# Patient Record
Sex: MALE | Race: WHITE | NOT HISPANIC OR LATINO | Employment: OTHER | URBAN - METROPOLITAN AREA
[De-identification: names, ages, dates, MRNs, and addresses within clinical notes are randomized per-mention and may not be internally consistent; named-entity substitution may affect disease eponyms.]

---

## 2017-01-06 ENCOUNTER — OFFICE VISIT (OUTPATIENT)
Dept: UROLOGY | Facility: CLINIC | Age: 72
End: 2017-01-06
Payer: COMMERCIAL

## 2017-01-06 VITALS
HEART RATE: 99 BPM | SYSTOLIC BLOOD PRESSURE: 145 MMHG | DIASTOLIC BLOOD PRESSURE: 72 MMHG | BODY MASS INDEX: 23.46 KG/M2 | WEIGHT: 149.5 LBS | HEIGHT: 67 IN

## 2017-01-06 DIAGNOSIS — R97.20 ELEVATED PSA: Primary | ICD-10-CM

## 2017-01-06 LAB
BILIRUB SERPL-MCNC: ABNORMAL MG/DL
BLOOD URINE, POC: ABNORMAL
COLOR, POC UA: YELLOW
GLUCOSE UR QL STRIP: 250
KETONES UR QL STRIP: ABNORMAL
LEUKOCYTE ESTERASE URINE, POC: ABNORMAL
NITRITE, POC UA: ABNORMAL
PH, POC UA: 6
PROTEIN, POC: ABNORMAL
SPECIFIC GRAVITY, POC UA: 1.01
UROBILINOGEN, POC UA: ABNORMAL

## 2017-01-06 PROCEDURE — 81002 URINALYSIS NONAUTO W/O SCOPE: CPT | Mod: S$GLB,,, | Performed by: UROLOGY

## 2017-01-06 PROCEDURE — 99204 OFFICE O/P NEW MOD 45 MIN: CPT | Mod: 25,S$GLB,, | Performed by: UROLOGY

## 2017-01-06 NOTE — PROGRESS NOTES
[facility] Note - [facility]  Staff: MD Michael    Referring provider and please cc: Dr.Rowe Merrill  PCP: same    [facility]:active    Chief Complaint: elevated psa    Subjective:        HPI: Nilton Sherwood is a 71 y.o. male presents with     He is being referred for elevated psa. Denies any uti symptoms and has never had any. He appears to have had elevated psa since , with psa of 4.2 and has risen most recently to 7.0. He has a h/o prostate biopsy in  by  at [facility], of which results were negative. He is not sure what his psa was then.    Denies back pain or bone pain.        AUA SSx: 8, mostly satisfied  IIEF: did not fill out    ECOG Status: 0    Gross Hematuria:No  STDs in past: No  Vasectomy: no    REVIEW OF SYSTEMS:  General ROS: no fevers, no chills  Psychological ROS: no depression  Endocrine ROS: no heat or cold  Respiratory ROS: no SOB  Cardiovascular ROS: no CP  Gastrointestinal ROS: no abdominal pain, no constipation, occ diarrhea, no BRBPR  Musculoskeletal ROS: no muscle pain  Neurological ROS: no headaches  Dermatological ROS: no rashes  HEENT: +glasses, no sinus   ROS: per HPI     PMHx:  Past Medical History   Diagnosis Date    Elevated glucose     Elevated PSA     Hyperlipidemia     Tobacco abuse    type II diabetes  Kidney stones: No  Cataracts? removed    PSHx:  Past Surgical History   Procedure Laterality Date    Prostate biopsy  2014     Negative       Stents/Valves/Foreign Bodies: No  Cardiac Evaluation: No    Screening Studies  Colonoscopy: yes, 6-8 years ago    Fam Hx:   malignancies: No  . Mother  a 82. Father  a ?  kidney stones: No     Soc Hx:  +tobacco.  1/2 pk per day x 20 year  noalcohol  Lives in [place]  :unmarried  Children: none  Occupation:retired retail managment    Allergies:  Review of patient's allergies indicates no known allergies.    Medications: reviewed   Anticoagulation: Yes - asa 81mg  daily    Objective:     Vitals:    01/06/17 1128   BP: (!) 145/72   Pulse: 99         General:WDWN in NAD  Eyes: PERRLA, normal conjunctiva  Respiratory: no increased work on breathing, clear to auscultation  Cardiovascular: regular rate and rhythm. No obvious extremity edema.  GI: palpation of masses. No tenderness. No hepatosplenomegaly to palpation.  Musculoskeletal: normal range of motion of bilateral upper extremities. Normal muscle strength and tone.  Skin: no obvious rashes or lesions. No tightening of skin noted.  Neurologic: CN grossly normal. Normal sensation.   Psychiatric: awake, alert and oriented x 3. Mood and affect normal. Cooperative.    :  Inspection of anus normal  No scrotal rashes, cysts or lesions  Epididymis normal in size, no tenderness  Testes normal and size, equal size bilaterally, no masses  Urethral meatus normal without discharge  Penis is not circumcised, easily retractable foreskin but slightly erythematous. Small 0.25cm brownish flat mole on glans.   ERNESTO: 30g gland without masses, tenderness. SV not palpable. Normal sphincter tone. +hemhorroids. Left gland more prominent but no nodules felt  No bilateral inguinal hernias noted           LABS REVIEW:  UA today: 1.015/6/250 glucose  UCx:  none    Cr:   Lab Results   Component Value Date    CREATININE 1.02 12/22/2016       PSA:   12/22/16 7.0  6/23/16 4.9  6/17/15 4.2    No results found for: PSA  Testosterone: No results found for: TESTOSTERONE    PATHOLOGY REVIEW:  none    RADIOGRAPHIC REVIEW:  none      Assessment:       1. Elevated PSA          Plan:     We need a copy of his prostate biopsy results, psa and op note from  from 2014. Depending on the results we will decide whether or not to repeat a prostate biopsy vs proceed with mri prostate. If his psa was 7 or higher previously and he had a negative prostate biopsy will plan for MRI prostate. If his psa was lower and had a negative prostate bx will plan for prostate  biopsy.     Of note he would need bactrim op and gent proep if we do proceed with bx, no valium as well as he would like to drive himself home.     Also asked pt to ensure he retracts foreskin daily and keeps his penis clean. Especially with his diabetes and risk of balanoposthitis.    And asked him to watch the lesion on his glans, feel like this is a mole but will need to watch to ensure this does not change in appearance.     Aleta Rodriguez MD

## 2017-01-12 ENCOUNTER — TELEPHONE (OUTPATIENT)
Dept: UROLOGY | Facility: CLINIC | Age: 72
End: 2017-01-12

## 2017-01-12 NOTE — TELEPHONE ENCOUNTER
Review of records from dr garo warren's office show that he had asap in his prostate. Looks like pt has f/u next Friday - i will discuss these results with him then, need to schedule prostate biopsy for him.   Of note he would need bactrim op and gent proep if we do proceed with bx, no valium as well as he would like to drive himself home.       trus bx 1/16/14: 15 g prostate on exam  trus bx results 1/16/14: initially b9 but then amenment states he had asap in left apex and left base

## 2017-01-20 ENCOUNTER — OFFICE VISIT (OUTPATIENT)
Dept: UROLOGY | Facility: CLINIC | Age: 72
End: 2017-01-20
Payer: COMMERCIAL

## 2017-01-20 VITALS
DIASTOLIC BLOOD PRESSURE: 70 MMHG | WEIGHT: 149.25 LBS | HEIGHT: 67 IN | SYSTOLIC BLOOD PRESSURE: 124 MMHG | BODY MASS INDEX: 23.43 KG/M2 | HEART RATE: 88 BPM

## 2017-01-20 DIAGNOSIS — R97.20 ELEVATED PSA: Primary | ICD-10-CM

## 2017-01-20 DIAGNOSIS — R89.7 ABNORMAL PROSTATE BIOPSY: ICD-10-CM

## 2017-01-20 LAB
BILIRUB SERPL-MCNC: ABNORMAL MG/DL
BLOOD URINE, POC: ABNORMAL
COLOR, POC UA: YELLOW
GLUCOSE UR QL STRIP: 50
KETONES UR QL STRIP: ABNORMAL
LEUKOCYTE ESTERASE URINE, POC: ABNORMAL
NITRITE, POC UA: ABNORMAL
PH, POC UA: 6
PROTEIN, POC: ABNORMAL
SPECIFIC GRAVITY, POC UA: 1.01
UROBILINOGEN, POC UA: ABNORMAL

## 2017-01-20 PROCEDURE — 81002 URINALYSIS NONAUTO W/O SCOPE: CPT | Mod: S$GLB,,, | Performed by: UROLOGY

## 2017-01-20 PROCEDURE — 99213 OFFICE O/P EST LOW 20 MIN: CPT | Mod: 25,S$GLB,, | Performed by: UROLOGY

## 2017-01-20 RX ORDER — SULFAMETHOXAZOLE AND TRIMETHOPRIM 800; 160 MG/1; MG/1
1 TABLET ORAL 2 TIMES DAILY
Qty: 3 TABLET | Refills: 0 | Status: SHIPPED | OUTPATIENT
Start: 2017-01-20 | End: 2017-01-22

## 2017-01-20 RX ORDER — GENTAMICIN SULFATE 40 MG/ML
80 INJECTION, SOLUTION INTRAMUSCULAR; INTRAVENOUS
Status: DISCONTINUED | OUTPATIENT
Start: 2017-01-20 | End: 2017-02-03

## 2017-01-20 RX ORDER — DIAZEPAM 5 MG/1
5 TABLET ORAL ONCE
Qty: 3 TABLET | Refills: 0 | Status: SHIPPED | OUTPATIENT
Start: 2017-01-20 | End: 2017-02-21

## 2017-01-20 NOTE — PROGRESS NOTES
[facility] - [facility]  Staff: MD Michael    Referring provider and please cc: Dr.Rowe Merrill  PCP: same    [facility]:active    Chief Complaint: elevated psa    Subjective:        HPI: Nilton Sherwood is a 71 y.o. male presents with     He is being referred for elevated psa. Denies any uti symptoms and has never had any. He appears to have had elevated psa since 2015, with psa of 4.2 and has risen most recently to 7.0. He has a h/o prostate biopsy in 2014 by  at [facility], of which results were negative. He is not sure what his psa was then.    Denies back pain or bone pain.    AUA SSx: 8, mostly satisfied  IIEF: did not fill out    ECOG Status: 0        Past medical, surgical, social and family hx have been reviewed. There have not any changes.     Allergies:  Review of patient's allergies indicates no known allergies.    Medications: reviewed   Anticoagulation: Yes - asa 81mg daily    Objective:     Vitals:    01/20/17 0959   BP: 124/70   Pulse: 88         General:WDWN in NAD  Eyes: PERRLA, normal conjunctiva  Respiratory: no increased work on breathing, clear to auscultation  Cardiovascular: regular rate and rhythm. No obvious extremity edema.  GI: palpation of masses. No tenderness. No hepatosplenomegaly to palpation.  Musculoskeletal: normal range of motion of bilateral upper extremities. Normal muscle strength and tone.  Skin: no obvious rashes or lesions. No tightening of skin noted.  Neurologic: CN grossly normal. Normal sensation.   Psychiatric: awake, alert and oriented x 3. Mood and affect normal. Cooperative.    Last ERNESTO 12/27/16:   Penis is not circumcised, easily retractable foreskin but slightly erythematous. Small 0.25cm brownish flat mole on glans.   ERNESTO: 30g gland without masses, tenderness. SV not palpable. Normal sphincter tone. +hemhorroids. Left gland more prominent but no nodules felt  No bilateral inguinal hernias noted     LABS REVIEW:  UA  today: 1.010/6/50 glucose  UCx:  none    Cr:   Lab Results   Component Value Date    CREATININE 1.02 12/22/2016       PSA:   12/22/16 7.0  6/23/16 4.9  6/17/15 4.2  trus bx results 1/16/14: initially b9 but then amenment states he had asap in left apex and left base, 15 g prostate on exam    No results found for: PSA  Testosterone: No results found for: TESTOSTERONE    PATHOLOGY REVIEW:  none    RADIOGRAPHIC REVIEW:  none      Assessment:       1. Elevated PSA    2. Abnormal prostate biopsy          Plan:     Review of records from dr garo warren's office show that he had asap in his prostate.   Of note he would need bactrim op and gent proep, no valium as well as he would like to drive himself home. Needs another trus bx with focus on left apex.     asked him to watch the lesion on his glans, feel like this is a mole but will need to watch to ensure this does not change in appearance.     Aleta Rodriguez MD

## 2017-01-20 NOTE — MR AVS SNAPSHOT
[facility]  [address] [place] [address]  Phone: [phone]  Fax: [phone]                  Nilton Sherwood   2017 10:00 AM   Office Visit    Description:  Male : 1945   Provider:  Aleta Rodriguez MD   Department:  [facility]           Reason for Visit     Follow-up           Diagnoses this Visit        Comments    Elevated PSA    -  Primary     Abnormal prostate biopsy                To Do List           Goals (5 Years of Data)     None       These Medications        Disp Refills Start End    sulfamethoxazole-trimethoprim 800-160mg (BACTRIM DS) 800-160 mg Tab 3 tablet 0 2017    Take 1 tablet by mouth 2 (two) times daily. - Oral    [facility] [address] Ph #: [phone]       diazePAM (VALIUM) 5 MG tablet 3 tablet 0 2017    Take 1 tablet (5 mg total) by mouth once. - Oral    [facility] [address] Ph #: [phone]       sodium phosphates (FLEET ENEMA) 19-7 gram/118 mL Enem 2 Bottle 0 2017    Place 1 enema rectally once. 1 fleets night before procedure  1 fleets morning of procedure - Rectal    [facility] [address] Ph #: [phone]         [facility]     [facility] Nurse Care [facility] -  Assistance  Registered nurses in the [facility] provide clinical advisement, health education, appointment booking, and other advisory services.  Call for this free service at [phone].             Medications           Message regarding Medications     Verify the changes and/or additions to your medication regime listed below are the same as discussed with your clinician today.  If any of these changes or additions are incorrect, please notify your healthcare provider.        START taking these NEW medications        Refills     sulfamethoxazole-trimethoprim 800-160mg (BACTRIM DS) 800-160 mg Tab 0    Sig: Take 1 tablet by mouth 2 (two) times daily.    Class: Normal    Route: Oral    diazePAM (VALIUM) 5 MG tablet 0    Sig: Take 1 tablet (5 mg total) by mouth once.    Class: Normal    Route: Oral    sodium phosphates (FLEET ENEMA) 19-7 gram/118 mL Enem 0    Sig: Place 1 enema rectally once. 1 fleets night before procedure  1 fleets morning of procedure    Class: Print    Route: Rectal      These medications were administered today        Dose Freq    gentamicin injection 80 mg 80 mg Every 12 hours (non-standard times)    Sig: Inject 80 mg into the muscle every 12 (twelve) hours.    Class: Normal    Route: Intramuscular           Verify that the below list of medications is an accurate representation of the medications you are currently taking.  If none reported, the list may be blank. If incorrect, please contact your healthcare provider. Carry this list with you in case of emergency.           Current Medications     aspirin (ECOTRIN) 81 MG EC tablet Take 81 mg by mouth once daily.    metformin (GLUCOPHAGE) 500 MG tablet Take 1 tablet (500 mg total) by mouth 2 (two) times daily with meals.    omega-3 acid ethyl esters (LOVAZA) 1 gram capsule Take 1 capsule (1 g total) by mouth 2 (two) times daily.    simvastatin (ZOCOR) 40 MG tablet take 1 tablet by mouth every evening    diazePAM (VALIUM) 5 MG tablet Take 1 tablet (5 mg total) by mouth once.    sodium phosphates (FLEET ENEMA) 19-7 gram/118 mL Enem Place 1 enema rectally once. 1 fleets night before procedure  1 fleets morning of procedure    sulfamethoxazole-trimethoprim 800-160mg (BACTRIM DS) 800-160 mg Tab Take 1 tablet by mouth 2 (two) times daily.           Clinical Reference Information           Vital Signs - Last Recorded  Most recent update: 1/20/2017  9:59 AM by Helga Castaneda LPN    BP Pulse Ht Wt BMI    124/70 (BP Location: Left arm, Patient Position: Sitting, BP Method: Automatic)  "88 5' 7" (1.702 m) 67.7 kg (149 lb 4 oz) 23.38 kg/m2      Blood Pressure          Most Recent Value    BP  124/70      Allergies as of 1/20/2017     No Known Allergies      Immunizations Administered on Date of Encounter - 1/20/2017     None      Orders Placed During Today's Visit      Normal Orders This Visit    Case Request Operating Room: TRANSRECTAL ULTRASOUND GUIDED PROSTATE BIOPSY     Urine Dip with micro, POC       Smoking Cessation     If you would like to quit smoking:   You may be eligible for free services if you are a [place] resident and started smoking cigarettes before September 1, 1988.  Call the Smoking Cessation Trust (SCT) toll free at (704) 392-2501 or (997) 458-7364.   Call 2-585-QUIT-NOW if you do not meet the above criteria.            "

## 2017-02-03 RX ORDER — GENTAMICIN SULFATE 80 MG/100ML
80 INJECTION, SOLUTION INTRAVENOUS
Status: DISCONTINUED | OUTPATIENT
Start: 2017-02-03 | End: 2017-02-03

## 2017-02-03 RX ORDER — SULFAMETHOXAZOLE AND TRIMETHOPRIM 800; 160 MG/1; MG/1
1 TABLET ORAL 2 TIMES DAILY
COMMUNITY
End: 2017-02-21

## 2017-02-06 ENCOUNTER — SURGERY (OUTPATIENT)
Age: 72
End: 2017-02-06

## 2017-02-06 ENCOUNTER — HOSPITAL ENCOUNTER (OUTPATIENT)
Facility: AMBULARY SURGERY CENTER | Age: 72
Discharge: HOME | End: 2017-02-06
Attending: UROLOGY | Admitting: UROLOGY
Payer: COMMERCIAL

## 2017-02-06 DIAGNOSIS — R97.20 ELEVATED PSA: ICD-10-CM

## 2017-02-06 DIAGNOSIS — R89.7 ABNORMAL PROSTATE BIOPSY: ICD-10-CM

## 2017-02-06 LAB
BACTERIA SPEC CULT: NORMAL
BILIRUB SERPL-MCNC: NORMAL MG/DL
BLOOD URINE, POC: NORMAL
CASTS: NORMAL
COLOR, POC UA: NORMAL
CRYSTALS: NORMAL
GLUCOSE UR QL STRIP: NORMAL
KETONES UR QL STRIP: NORMAL
LEUKOCYTE ESTERASE URINE, POC: NORMAL
NITRITE, POC UA: NORMAL
PH, POC UA: 8
PROTEIN, POC: NORMAL
RBC CELLS COUNTED: NORMAL
SPECIFIC GRAVITY, POC UA: 1
UROBILINOGEN, POC UA: NORMAL
WHITE BLOOD CELLS: NORMAL

## 2017-02-06 PROCEDURE — 88305 TISSUE EXAM BY PATHOLOGIST: CPT | Performed by: PATHOLOGY

## 2017-02-06 PROCEDURE — 76872 US TRANSRECTAL: CPT | Performed by: UROLOGY

## 2017-02-06 PROCEDURE — 76942 ECHO GUIDE FOR BIOPSY: CPT | Mod: 26,59,, | Performed by: UROLOGY

## 2017-02-06 PROCEDURE — 88341 IMHCHEM/IMCYTCHM EA ADD ANTB: CPT | Mod: 26,,, | Performed by: PATHOLOGY

## 2017-02-06 PROCEDURE — 76872 US TRANSRECTAL: CPT | Mod: 26,,, | Performed by: UROLOGY

## 2017-02-06 PROCEDURE — 88342 IMHCHEM/IMCYTCHM 1ST ANTB: CPT | Mod: 26,,, | Performed by: PATHOLOGY

## 2017-02-06 PROCEDURE — 55700 PR BIOPSY OF PROSTATE,NEEDLE/PUNCH: CPT | Mod: ,,, | Performed by: UROLOGY

## 2017-02-06 PROCEDURE — 55700 HC PROSTATE NEEDLE BIOPSY: CPT | Performed by: UROLOGY

## 2017-02-06 RX ORDER — LIDOCAINE HYDROCHLORIDE 20 MG/ML
JELLY TOPICAL
Status: DISCONTINUED
Start: 2017-02-06 | End: 2017-02-06 | Stop reason: HOSPADM

## 2017-02-06 RX ORDER — GENTAMICIN SULFATE 40 MG/ML
80 INJECTION, SOLUTION INTRAMUSCULAR; INTRAVENOUS ONCE
Status: COMPLETED | OUTPATIENT
Start: 2017-02-06 | End: 2017-02-06

## 2017-02-06 RX ORDER — LIDOCAINE HYDROCHLORIDE 10 MG/ML
INJECTION, SOLUTION EPIDURAL; INFILTRATION; INTRACAUDAL; PERINEURAL
Status: DISCONTINUED | OUTPATIENT
Start: 2017-02-06 | End: 2017-02-06 | Stop reason: HOSPADM

## 2017-02-06 RX ORDER — LIDOCAINE HYDROCHLORIDE 20 MG/ML
JELLY TOPICAL
Status: DISCONTINUED | OUTPATIENT
Start: 2017-02-06 | End: 2017-02-06 | Stop reason: HOSPADM

## 2017-02-06 RX ORDER — GENTAMICIN SULFATE 40 MG/ML
INJECTION, SOLUTION INTRAMUSCULAR; INTRAVENOUS
Status: DISPENSED
Start: 2017-02-06 | End: 2017-02-07

## 2017-02-06 RX ADMIN — LIDOCAINE HYDROCHLORIDE 5 ML: 20 JELLY TOPICAL at 02:02

## 2017-02-06 RX ADMIN — LIDOCAINE HYDROCHLORIDE 10 ML: 10 INJECTION, SOLUTION EPIDURAL; INFILTRATION; INTRACAUDAL; PERINEURAL at 03:02

## 2017-02-06 RX ADMIN — GENTAMICIN SULFATE 80 MG: 40 INJECTION, SOLUTION INTRAMUSCULAR; INTRAVENOUS at 02:02

## 2017-02-06 NOTE — DISCHARGE INSTRUCTIONS
Transrectal Ultrasound and Biopsy  A transrectal ultrasound is an imaging test. It uses sound waves to create pictures of a mans prostate gland. Your prostate gland is in front of your rectum. For this test, a special probe (transducer) is placed directly into your rectum. During the test, tissue samples (a biopsy) may also be taken. The test is done by a specially trained technologist (a sonographer).    Before leaving, you may need to wait for a short time while the images are reviewed. In most cases, you can go back to your normal routine after the test. If you had a biopsy, you may see some blood in your urine, sperm, or stool for a day or so. This is normal. Your health care provider will let you know when your test results are ready.  In some cases, a diagnosis cant be made from the tissue sample that was taken. If this happens, your provider will talk with you about whether you may need another biopsy. Or you may need a different procedure.       © 4703-4668 [employer]. [address] [place] [address], [place] [address]. All rights reserved. This information is not intended as a substitute for professional medical care. Always follow your healthcare professional's instructions.

## 2017-02-06 NOTE — IP AVS SNAPSHOT
[facility] [place] Location  [address] [address]           Patient Discharge Instructions     Our goal is to set you up for success. This packet includes information on your condition, medications, and your home care. It will help you to care for yourself so you don't get sicker and need to go back to the hospital.     Please ask your nurse if you have any questions.        There are many details to remember when preparing to leave the hospital. Here is what you will need to do:    1. Take your medicine. If you are prescribed medications, review your Medication List in the following pages. You may have new medications to  at the pharmacy and others that you'll need to stop taking. Review the instructions for how and when to take your medications. Talk with your doctor or nurses if you are unsure of what to do.     2. Go to your follow-up appointments. Specific follow-up information is listed in the following pages. Your may be contacted by a transition nurse or clinical provider about future appointments. Be sure we have all of the phone numbers to reach you, if needed. Please contact your provider's office if you are unable to make an appointment.     3. Watch for warning signs. Your doctor or nurse will give you detailed warning signs to watch for and when to call for assistance. These instructions may also include educational information about your condition. If you experience any of warning signs to your health, call your doctor.               [facility]  Unless otherwise directed by your provider, please contact [facility], our nurse care line that is available for 24/7 assistance.     [phone] (toll-free)    Registered nurses in the [facility] provide clinical advisement, health education, appointment booking, and other advisory services.                    ** Verify the list of medication(s) below is accurate and up  to date. Carry this with you in case of emergency. If your medications have changed, please notify your healthcare provider.             Medication List      ASK your doctor about these medications        Additional Info                      aspirin 81 MG EC tablet   Commonly known as:  ECOTRIN   Refills:  0   Dose:  81 mg    Instructions:  Take 81 mg by mouth once daily.     Begin Date    AM    Noon    PM    Bedtime       diazePAM 5 MG tablet   Commonly known as:  VALIUM   Quantity:  3 tablet   Refills:  0   Dose:  5 mg    Instructions:  Take 1 tablet (5 mg total) by mouth once.     Begin Date    AM    Noon    PM    Bedtime       DISPOSABLE ENEMA 19-7 gram/118 mL Enem   Refills:  0   Dose:  1 enema   Generic drug:  sodium phosphates    Instructions:  Place 1 enema rectally once.     Begin Date    AM    Noon    PM    Bedtime       metformin 500 MG tablet   Commonly known as:  GLUCOPHAGE   Quantity:  60 tablet   Refills:  3   Dose:  500 mg    Instructions:  Take 1 tablet (500 mg total) by mouth 2 (two) times daily with meals.     Begin Date    AM    Noon    PM    Bedtime       omega-3 acid ethyl esters 1 gram capsule   Commonly known as:  LOVAZA   Quantity:  60 capsule   Refills:  11   Dose:  1 g    Instructions:  Take 1 capsule (1 g total) by mouth 2 (two) times daily.     Begin Date    AM    Noon    PM    Bedtime       simvastatin 40 MG tablet   Commonly known as:  ZOCOR   Quantity:  90 tablet   Refills:  3   Dose:  40 mg    Instructions:  Take 1 tablet (40 mg total) by mouth every evening.     Begin Date    AM    Noon    PM    Bedtime       sulfamethoxazole-trimethoprim 800-160mg 800-160 mg Tab   Commonly known as:  BACTRIM DS   Refills:  0   Dose:  1 tablet    Instructions:  Take 1 tablet by mouth 2 (two) times daily. As directed     Begin Date    AM    Noon    PM    Bedtime                  Please bring to all follow up appointments:    1. A copy of your discharge instructions.  2. All medicines you are  currently taking in their original bottles.  3. Identification and insurance card.    Please arrive 15 minutes ahead of scheduled appointment time.    Please call 24 hours in advance if you must reschedule your appointment and/or time.        Your Scheduled Appointments     Feb 21, 2017 10:00 AM CST   Post OP with MD [facility] ([place] [facility])    [address]  [place] [address]   [phone]            Apr 11, 2017  9:45 AM CDT   LAB-OCC with  - LAB   Sha Merrill III, M.D. ([facility])    [address]  [facility] [address] [phone]            Apr 18, 2017 10:30 AM CDT   Established Patient with MD Sha Claros III, III, M.D. ([facility])    [address]  [facility] [address] [phone]                Discharge Instructions     Future Orders    Diet general     Questions:    Total calories:      Fat restriction, if any:      Protein restriction, if any:      Na restriction, if any:      Fluid restriction:      Additional restrictions:          Discharge Instructions           Transrectal Ultrasound and Biopsy  A transrectal ultrasound is an imaging test. It uses sound waves to create pictures of a mans prostate gland. Your prostate gland is in front of your rectum. For this test, a special probe (transducer) is placed directly into your rectum. During the test, tissue samples (a biopsy) may also be taken. The test is done by a specially trained technologist (a sonographer).    Before leaving, you may need to wait for a short time while the images are reviewed. In most cases, you can go back to your normal routine after the test. If you had a biopsy, you may see some blood in your urine, sperm, or stool for a day or so. This is normal. Your health care provider will let you know when your test results are ready.  In some cases, a diagnosis cant be made from the tissue sample that was taken. If this happens, your  "provider will talk with you about whether you may need another biopsy. Or you may need a different procedure.       © 4229-6277 [employer]. [address] [place] [address], [place] [address]. All rights reserved. This information is not intended as a substitute for professional medical care. Always follow your healthcare professional's instructions.            Admission Information     Date & Time Provider Department CSN    2/6/2017  1:30 PM Aleta Rodriguez MD [facility] [id number]      Care Providers     Provider Role Specialty Primary office phone    Aleta Rodriguez MD Attending Provider Urology [phone]    Aleta Rodriguez MD Surgeon  Urology [phone]      Your Vitals Were     BP Pulse Temp Resp Height Weight    137/71 (BP Location: Right arm, Patient Position: Sitting, BP Method: Automatic) 75 97.4 °F (36.3 °C) (Oral) 20 5' 7" (1.702 m) 67.6 kg (149 lb)    SpO2 BMI             98% 23.34 kg/m2         Recent Lab Values     No lab values to display.      Allergies as of 2/6/2017     No Known Allergies      Smoking Cessation     If you would like to quit smoking:   You may be eligible for free services if you are a [place] resident and started smoking cigarettes before September 1, 1988.  Call the Smoking Cessation Trust (SCT) toll free at (911) 785-6123 or (426) 243-5959.   Call 1-800-QUIT-NOW if you do not meet the above criteria.            Language Assistance Services     ATTENTION: Language assistance services are available, free of charge. Please call [phone].      ATENCIÓN: Si habla español, tiene a julian disposición servicios gratuitos de asistencia lingüística. Llame al [phone].     CHÚ Ý: N?u b?n nói Ti?ng Vi?t, có các d?ch v? h? tr? ngôn ng? mi?n phí dành cho b?n. G?i s? [phone].         [facility] complies with applicable Federal civil rights laws and does not discriminate on the basis of race, color, " national origin, age, disability, or sex.

## 2017-02-06 NOTE — H&P
[facility] Note - [facility]  Staff: MD Michael     Referring provider and please cc: Dr.Rowe Merrill  PCP: same     [facility]     Chief Complaint: elevated psa     Subjective:         HPI: Nilton Sherwood is a 71 y.o. male presents with      He is being referred for elevated psa. Denies any uti symptoms and has never had any. He appears to have had elevated psa since 2015, with psa of 4.2 and has risen most recently to 7.0. He has a h/o prostate biopsy in 2014 by  at [facility], of which results were negative initially but amendment later said asap of left apex and left base. He is not sure what his psa was then.     Denies back pain or bone pain.      AUA SSx: 8, mostly satisfied  IIEF: did not fill out     ECOG Status: 0           Past medical, surgical, social and family hx have been reviewed. There have not any changes.      Allergies:  Review of patient's allergies indicates no known allergies.     Medications: reviewed   Anticoagulation: Yes - asa 81mg daily     Objective:      Vitals:    02/06/17 1349   BP: 127/62   Pulse: 85   Resp: 20   Temp: 97.4 °F (36.3 °C)       General:WDWN in NAD  Eyes: PERRLA, normal conjunctiva  Respiratory: no increased work on breathing, clear to auscultation  Cardiovascular: regular rate and rhythm. No obvious extremity edema.  GI: palpation of masses. No tenderness. No hepatosplenomegaly to palpation.  Musculoskeletal: normal range of motion of bilateral upper extremities. Normal muscle strength and tone.  Skin: no obvious rashes or lesions. No tightening of skin noted.  Neurologic: CN grossly normal. Normal sensation.   Psychiatric: awake, alert and oriented x 3. Mood and affect normal. Cooperative.     Last ERNESTO 12/27/16:   Penis is not circumcised, easily retractable foreskin but slightly erythematous. Small 0.25cm brownish flat mole on glans.   ERNESTO: 30g gland without masses, tenderness. SV not palpable. Normal sphincter tone.  +hemhorroids. Left gland more prominent but no nodules felt  No bilateral inguinal hernias noted      LABS REVIEW:  UA today: 1.005/8/remainder neg  UCx:  none     Cr:         Lab Results   Component Value Date     CREATININE 1.02 12/22/2016         PSA:   12/22/16                    7.0  6/23/16                      4.9  6/17/15                      4.2  trus bx results 1/16/14: initially b9 but then amendment states he had asap in left apex and left base, 15 g prostate on exam     No results found for: PSA  Testosterone: No results found for: TESTOSTERONE     PATHOLOGY REVIEW:  none     RADIOGRAPHIC REVIEW:  none        Assessment:       1. Elevated PSA    2. Abnormal prostate biopsy           Plan:      Review of records from dr garo warren's office show that he had asap in his prostate.   Of note he would need bactrim op and gent proep, no valium as well as he would like to drive himself home. Needs another trus bx with focus on left apex.      asked him to watch the lesion on his glans, feel like this is a mole but will need to watch to ensure this does not change in appearance.      Aleta Rodriguez MD

## 2017-02-06 NOTE — OP NOTE
TRANSRECTAL PROSTATIC ULTRASOUND  TRANSRECTAL PROSTATE BIOPSY with ultrasound guidance    Staff surgeon: Michael  Date: 2017  Anesthesia: local  EBL: minimal  Complications: none    NAME:Nilton Sherwood  : 1945  Diagnosis:elevated psa, asap on previous biopsy (left apex and left base)  Current PSA: 7.0     TRANSRECTAL ULTRASOUND  Measurements:   Height:  27.1 mm  Width:  40.6 mm  Length:  40.8 mm  Volume: 23.7 cm3  PSAD: 0.29    Urinalysis: negative    Palpable Nodule: No    Anticoagulation: yes, asa 81mg    Seminal vesicles/Ejaculatory Ducts: Normal  Outline/Symmetry of Prostate: WNL - left base with possible exophytic mass included in biopsy  Central Gland/Transition Zone: Well demarcated  Peripheral Zone: WNL  Bladder: Normal  MedianLobe: Normal    It was confirmed that the patient took the antibiotic and fleets enemas last night and this morning. A ERNESTO was performed showing no palpable nodules. A total of 17 biopsies were taken with ultrasound guidance, using a spring loaded needle device. Two biopsies each were taken from the base, mid-portion, apex from both the left and right as well as one from each transition zone. 2 extra biopsies were taken from left base and 1 extra from left apex. Minimal rectal bleeding was observed. The patient tolerated the procedure well. He is to keep taking the prescribed antibiotics until finished. Post biopsy instructions were given and he is to follow up in one week to discuss the pathologist report.      Follow up in 10-14 days to discuss path  Pt was instructed when to return to ER  Aleta Rodriguez MD

## 2017-02-06 NOTE — DISCHARGE SUMMARY
[facility]  Discharge Note  Short Stay    Admit Date: 2/6/2017    Discharge Date and Time: No discharge date for patient encounter.     Attending Physician: Aleta Rodriguez,*     Discharge Provider: Aleta Rodriguez    Diagnoses:  Active Hospital Problems    Diagnosis  POA    Elevated PSA [R97.20]  Yes      Resolved Hospital Problems    Diagnosis Date Resolved POA   No resolved problems to display.       Discharged Condition: good    Hospital Course: Patient was admitted for an outpatient procedure and tolerated the procedure well with no complications.    Final Diagnoses: Same as principal problem.    Disposition: Home or Self Care    Follow up/Patient Instructions:    Medications:  Reconciled Home Medications:   Current Discharge Medication List      CONTINUE these medications which have NOT CHANGED    Details   aspirin (ECOTRIN) 81 MG EC tablet Take 81 mg by mouth once daily.      diazePAM (VALIUM) 5 MG tablet Take 1 tablet (5 mg total) by mouth once.  Qty: 3 tablet, Refills: 0      metformin (GLUCOPHAGE) 500 MG tablet Take 1 tablet (500 mg total) by mouth 2 (two) times daily with meals.  Qty: 60 tablet, Refills: 3      omega-3 acid ethyl esters (LOVAZA) 1 gram capsule Take 1 capsule (1 g total) by mouth 2 (two) times daily.  Qty: 60 capsule, Refills: 11    Associated Diagnoses: Hyperlipidemia      simvastatin (ZOCOR) 40 MG tablet Take 1 tablet (40 mg total) by mouth every evening.  Qty: 90 tablet, Refills: 3    Associated Diagnoses: Encounter for long-term (current) use of high-risk medication      sodium phosphates (DISPOSABLE ENEMA) 19-7 gram/118 mL Enem Place 1 enema rectally once.      sulfamethoxazole-trimethoprim 800-160mg (BACTRIM DS) 800-160 mg Tab Take 1 tablet by mouth 2 (two) times daily. As directed             Discharge Procedure Orders  Diet general           Discharge Procedure Orders (must include Diet, Follow-up, Activity):    Discharge Procedure Orders (must include  Diet, Follow-up, Activity)  Diet general

## 2017-02-07 VITALS
DIASTOLIC BLOOD PRESSURE: 71 MMHG | HEIGHT: 67 IN | SYSTOLIC BLOOD PRESSURE: 137 MMHG | HEART RATE: 75 BPM | WEIGHT: 149 LBS | OXYGEN SATURATION: 98 % | TEMPERATURE: 97 F | BODY MASS INDEX: 23.39 KG/M2 | RESPIRATION RATE: 20 BRPM

## 2017-02-21 ENCOUNTER — OFFICE VISIT (OUTPATIENT)
Dept: UROLOGY | Facility: CLINIC | Age: 72
End: 2017-02-21
Payer: COMMERCIAL

## 2017-02-21 VITALS
TEMPERATURE: 98 F | DIASTOLIC BLOOD PRESSURE: 78 MMHG | HEIGHT: 67 IN | WEIGHT: 150.13 LBS | SYSTOLIC BLOOD PRESSURE: 124 MMHG | BODY MASS INDEX: 23.56 KG/M2 | HEART RATE: 92 BPM

## 2017-02-21 DIAGNOSIS — C61 PROSTATE CANCER: Primary | ICD-10-CM

## 2017-02-21 LAB
BILIRUB SERPL-MCNC: ABNORMAL MG/DL
BLOOD URINE, POC: ABNORMAL
COLOR, POC UA: ABNORMAL
GLUCOSE UR QL STRIP: 100
KETONES UR QL STRIP: ABNORMAL
LEUKOCYTE ESTERASE URINE, POC: ABNORMAL
NITRITE, POC UA: ABNORMAL
PH, POC UA: 6
PROTEIN, POC: ABNORMAL
SPECIFIC GRAVITY, POC UA: 1.01
UROBILINOGEN, POC UA: ABNORMAL

## 2017-02-21 PROCEDURE — 81002 URINALYSIS NONAUTO W/O SCOPE: CPT | Mod: S$GLB,,, | Performed by: UROLOGY

## 2017-02-21 PROCEDURE — 99214 OFFICE O/P EST MOD 30 MIN: CPT | Mod: 25,S$GLB,, | Performed by: UROLOGY

## 2017-02-21 PROCEDURE — 99999 PR PBB SHADOW E&M-EST. PATIENT-LVL III: CPT | Mod: PBBFAC,,, | Performed by: UROLOGY

## 2017-02-21 NOTE — MR AVS SNAPSHOT
[facility]  [address]  [place] [address]  Phone: [phone]                  Nilton Sherwood   2017 10:00 AM   Office Visit    Description:  Male : 1945   Provider:  Aleta Rodriguez MD   Department:  [facility]           Reason for Visit     Post-op Evaluation           Diagnoses this Visit        Comments    Prostate cancer    -  Primary            To Do List           Future Appointments        Provider Department Dept Phone    3/14/2017 10:45 AM Narinder Her MD [facility] [phone]      Goals (5 Years of Data)     None      Follow-Up and Disposition     Follow-up and Disposition History      [facility]     [facility] -  Assistance  Registered nurses in the [facility] provide clinical advisement, health education, appointment booking, and other advisory services.  Call for this free service at [phone].             Medications           Message regarding Medications     Verify the changes and/or additions to your medication regime listed below are the same as discussed with your clinician today.  If any of these changes or additions are incorrect, please notify your healthcare provider.        STOP taking these medications     diazePAM (VALIUM) 5 MG tablet Take 1 tablet (5 mg total) by mouth once.    sulfamethoxazole-trimethoprim 800-160mg (BACTRIM DS) 800-160 mg Tab Take 1 tablet by mouth 2 (two) times daily. As directed    sodium phosphates (DISPOSABLE ENEMA) 19-7 gram/118 mL Enem Place 1 enema rectally once.           Verify that the below list of medications is an accurate representation of the medications you are currently taking.  If none reported, the list may be blank. If incorrect, please contact your healthcare provider. Carry this list with you in case of emergency.           Current Medications     aspirin (ECOTRIN) 81 MG EC tablet Take 81 mg by mouth once  "daily.    metformin (GLUCOPHAGE) 500 MG tablet Take 1 tablet (500 mg total) by mouth 2 (two) times daily with meals.    omega-3 acid ethyl esters (LOVAZA) 1 gram capsule Take 1 capsule (1 g total) by mouth 2 (two) times daily.    simvastatin (ZOCOR) 40 MG tablet Take 1 tablet (40 mg total) by mouth every evening.           Clinical Reference Information           Your Vitals Were     BP Pulse Temp Height Weight BMI    124/78 92 98.2 °F (36.8 °C) 5' 7" (1.702 m) 68.1 kg (150 lb 2.1 oz) 23.51 kg/m2      Blood Pressure          Most Recent Value    BP  124/78      Allergies as of 2/21/2017     No Known Allergies      Immunizations Administered on Date of Encounter - 2/21/2017     None      Orders Placed During Today's Visit      Normal Orders This Visit    POCT URINE DIPSTICK WITHOUT MICROSCOPE       Smoking Cessation     If you would like to quit smoking:   You may be eligible for free services if you are a [place] resident and started smoking cigarettes before September 1, 1988.  Call the Smoking Cessation Trust ([facility]) toll free at ([phone] or [phone].   Call 3-117-QUIT-NOW if you do not meet the above criteria.            Language Assistance Services     ATTENTION: Language assistance services are available, free of charge. Please call [phone].      ATENCIÓN: Si habla español, tiene a julian disposición servicios gratuitos de asistencia lingüística. Llame al [phone].     CHÚ Ý: N?u b?n nói Ti?ng Vi?t, có các d?ch v? h? tr? ngôn ng? mi?n phí dành cho b?n. G?i s? [phone].         [facility] complies with applicable Federal civil rights laws and does not discriminate on the basis of race, color, national origin, age, disability, or sex.        "

## 2017-02-21 NOTE — PROGRESS NOTES
[facility] Note - [place]  Staff: Michael  PCP: Dr.Crowder [facility]: active    CC: post-op prostate biopsy fu    Post-Operative Follow-up  He is being referred for elevated psa. Denies any uti symptoms and has never had any. He appears to have had elevated psa since 2015, with psa of 4.2 and has risen most recently to 7.0. He has a h/o prostate biopsy in 2014 by  at [facility], of which results were negative. trus bx results 1/16/14: initially b9 but then amenment states he had asap in left apex and left base, 15 g prostate on exam. He is not sure what his psa was then. I scheduled him for rebiopsy.    Patient here for post-op follow-up. Patient is 2 weeks status post TRUS/prostate biopsy  Tolerated the biopsy without problems        LEFT   RIGHT  BASE  3+3, 2/4,(9%)   b9  MID  b9   b9  APEX  3+3, 1/2, (30%) b9  TZ  3+3, 1/1 (30%) b9     Date of Biopsy: 1/20/17  PSA: 7.0  Volume: 23.7  Prostate nodule: no  AUA ssx: 8, mostly satisfied  IIEF: he is able to get erections hard enough for penetration, ok duration, but not sexually active and ok with this.     Urologic meds:  none   Anticoagulation: asa 81mg daily  Vitals:    02/21/17 1005   BP: 124/78   Pulse: 92   Temp: 98.2 °F (36.8 °C)     gen wdwn in nad      UA: 1.015/6/1+/100 glucose/tr blood    Labs:   PSA:   12/22/16 7.0  6/23/16 4.9  6/17/15 4.2  trus bx results 1/16/14: initially b9 but then amenment states he had asap in left apex and left base, 15 g prostate on exam      Assesment:  Nilton Sherwood is a 71 y.o. male is a 63 y.o. male s/p TRUS prostate biopsy for elevated psa and h/o asap  Encounter Diagnosis   Name Primary?    Prostate cancer Yes   [id number] prostate cancer    Plan:   I spent 30 minutes with the patient of which more than half was spent in direct consultation with the patient in regards to our treatment and plan.  We reviewed his pathology. His numbers were plugged into the [facility] nomogram. His  likelihood of organ confined disease 64 %, extracapsular extension 36%, seminal vesicle involvement 1%, lymph node involvement 1%, 99% cancer specific survival at 10 and 15 years, progression free survival at 5 years 94% and 99% at 10 years with radical prostatectomy.   We discussed all treatments and their associated risks and benefits to include active surveillance, brachytherapy, external beam radiation, prostatectomy robotic and open, cryotherapy and hormone ablation. The patient had the opportunity to ask questions.        I gave him information on prostate cancer today    I feel that with his minimal comorbidites, long life expectancy and  pt's prostate nodule seen on ultrasound exam which was biopsied that he should proceed with treatment rather than active surveillance. with his low risk disease and abnormal finding on ultrasound, which I understand is not typical, but does show cancer his specimen,  I think he would do better with surgery. That being said I I will refer him to a radiation oncologist to discuss radiation treatment options so that he can make an informed decision. In addition at some point he may need multimodal therapy if he has positive margins, T3 dz or rising psa.     He will return in two weeks with  to answer any further questions and to let us know his treatment decision.       Aleta Rodriguez MD

## 2017-02-23 PROBLEM — C61 PROSTATE CANCER: Status: ACTIVE | Noted: 2017-02-23

## 2017-02-24 ENCOUNTER — TELEPHONE (OUTPATIENT)
Dept: UROLOGY | Facility: CLINIC | Age: 72
End: 2017-02-24

## 2017-02-26 ENCOUNTER — PATIENT MESSAGE (OUTPATIENT)
Dept: UROLOGY | Facility: CLINIC | Age: 72
End: 2017-02-26

## 2017-03-10 PROBLEM — R94.31 ABNORMAL EKG: Status: ACTIVE | Noted: 2017-03-10

## 2017-03-10 PROBLEM — M25.512 LEFT SHOULDER PAIN: Status: ACTIVE | Noted: 2017-03-10

## 2017-03-10 PROBLEM — M25.612 DECREASED ROM OF LEFT SHOULDER: Status: ACTIVE | Noted: 2017-03-10

## 2017-03-13 PROBLEM — I45.4 BBB (BUNDLE BRANCH BLOCK): Status: ACTIVE | Noted: 2017-03-13

## 2017-03-13 PROBLEM — I42.9 CARDIOMYOPATHY: Status: ACTIVE | Noted: 2017-03-13

## 2017-03-13 PROBLEM — R93.89 ABNORMAL VENTRICULAR WALL MOTION: Status: ACTIVE | Noted: 2017-03-13

## 2017-03-13 PROBLEM — F17.200 CURRENT SMOKER: Status: ACTIVE | Noted: 2017-03-13

## 2017-03-13 PROBLEM — M19.019 AC JOINT ARTHROPATHY: Status: ACTIVE | Noted: 2017-03-13

## 2017-03-13 PROBLEM — M75.32 CALCIFIC TENDONITIS OF LEFT SHOULDER: Status: ACTIVE | Noted: 2017-03-13

## 2017-03-13 PROBLEM — R94.30 CARDIAC LV EJECTION FRACTION 30-35%: Status: ACTIVE | Noted: 2017-03-13

## 2017-03-13 PROBLEM — I44.7 LEFT BUNDLE BRANCH BLOCK: Status: ACTIVE | Noted: 2017-03-13

## 2017-03-13 PROBLEM — Z82.49 FAMILY HISTORY OF HEART DISEASE: Status: ACTIVE | Noted: 2017-03-13

## 2017-03-14 ENCOUNTER — OFFICE VISIT (OUTPATIENT)
Dept: UROLOGY | Facility: CLINIC | Age: 72
End: 2017-03-14
Payer: COMMERCIAL

## 2017-03-14 VITALS
SYSTOLIC BLOOD PRESSURE: 134 MMHG | WEIGHT: 147 LBS | BODY MASS INDEX: 23.07 KG/M2 | HEIGHT: 67 IN | TEMPERATURE: 98 F | HEART RATE: 77 BPM | DIASTOLIC BLOOD PRESSURE: 77 MMHG

## 2017-03-14 DIAGNOSIS — C61 PROSTATE CANCER: Primary | ICD-10-CM

## 2017-03-14 PROCEDURE — 99214 OFFICE O/P EST MOD 30 MIN: CPT | Mod: S$GLB,,, | Performed by: UROLOGY

## 2017-03-14 PROCEDURE — 99999 PR PBB SHADOW E&M-EST. PATIENT-LVL III: CPT | Mod: PBBFAC,,, | Performed by: UROLOGY

## 2017-03-14 NOTE — Clinical Note
Please follow up with patient to see which cardiologist he saw and when, and make sure we get records from them

## 2017-03-14 NOTE — Clinical Note
Assuming cardiac evaluation permits, patient would like to undergo robotic prostatectomy. Please also follow up with him regarding control of his DM as I noticed his a1c rising and want to make sure he is in the best shape preoperatively.

## 2017-03-14 NOTE — PROGRESS NOTES
[facility]:    Nilton Sherwood is a 71 y.o. male who presents for evaluation of prostate cancer, referred by Dr Rodriguez.     He was originally referred to our practice by Dr Merrill when his psa was found to be 7.0, which was a rise from 4.2 in 2015. He does have a history of prostate biopsy in 2014 by  at [facility]. Trus bx results 1/16/14 were initially benign then amended to report ASAP in [place] and [facility].    Biopsy by Dr Rodriguez on 2.6.17 revealed 23.7g gland with nodule/mass seen on ultrasound at [facility] and targeted with extra biopsy cores, which were positive.  4 core positive isaiah 3+3=6, including at area of suspicion on ultrasound.    Prostate, left base, biopsy:  Adenocarcinoma, Isaiah grade 3+3 = 6, in 2 of 4 core(s), involving 9% of needle core tissue.  Prostate, left transition zone, biopsy:  Adenocarcinoma, Martinez grade 3+3 = 6, in 1 of 1 core(s), involving 30% of needle core tissue.  Prostate, left apex, biopsy:  Adenocarcinoma, Martinez grade 3+ 3 = 6, in 1 of 2 core(s), involving 26% of needle core tissue.    Despite his low grade pathology, he has been recommended to undergo treatment given his history of elevated psa and fairly exophytic lesion on ultrasound increasing risk for extraprostatic extension. He saw Dr Garcia on 2/27/17 to discuss radiation therapy, and presents today to discuss surgical management, specifically robotic prostatectomy.  He has already decided he would like to proceed with surgery noting radiation and the number of treatments is not practical for his lifestyle. He is well informed and well read and comes in today with a list of questions about the procedure, recovery period, and side effects.    He did however present to Dr Merrill's office on 3/10 with acute onset sudden L severe shoulder pain. In office EKG reportedly abnormal and different than previous EKGs on file and he was sent to [facility] for cardiac evaluation, where he  "was admitted for 3 days. He followed up in Dr Merrill's office yesterday and visit notes indicate he was found to have a new LBBB, and an EF= 35% with cardiomyopathy with new wall motion abnormalities. He is not established with a cardiologist and notes his last stress test was "years ago".  He does have diabetes and hyperlipidemia, and smokes 8 cigarettes per day, and also has a strong family history of CAD (brother had MI age 40, mother  of CHF).   Denies chest pain, all shoulder pain has resolved, and he is due to see a cardiologist next week, as arranged by Dr Merrill  His last HbA1c was also 8.8 in Dec 2016, increased from previous.    AUA SS . Not sexually active, and can get erections though doesn't use them and is not interested.      Past Medical History:   Diagnosis Date    Colon polyp     Diabetes mellitus, type 2     Elevated glucose     Elevated PSA     Hyperlipidemia     Prostate cancer     Tobacco abuse        Past Surgical History:   Procedure Laterality Date    COLONOSCOPY      6 years ago    PROSTATE BIOPSY  2014    Negative       Family History   Problem Relation Age of Onset    Heart disease Mother     Heart failure Mother     Heart disease Father        Social History     Social History    Marital status: Single     Spouse name: N/A    Number of children: N/A    Years of education: N/A     Occupational History    Not on file.     Social History Main Topics    Smoking status: Current Every Day Smoker    Smokeless tobacco: Not on file    Alcohol use No    Drug use: Not on file    Sexual activity: Not on file     Other Topics Concern    Not on file     Social History Narrative       Review of patient's allergies indicates:  No Known Allergies    Medications Reviewed: see MAR    ROS:    Respiratory: negative for cough, shortness of breath, wheezing, dyspnea.  Cardiovascular: negative for chest pain, varicose veins, ankle swelling, palpitations, syncope.  GI: negative " for abdominal pain, heartburn, indigestion, nausea, vomiting, constipation, diarrhea, blood in stool.   Urology: as noted above in HPI, negative for hematuria, dysuria, retention  Endocrinology: negative for cold intolerance, excessive thirst, not feeling tired/sluggish, no heat intolerance.   Hematology/Lymph: negative for easy bleeding, easy bruising, swollen glands.  Musculoskeletal: negative for back pain, joint pain, joint swelling, neck pain.  Allergy-Immunology: negative for seasonal allergies, negative for  unusual infections.   Skin: negative for boils, breast lumps, hives, itching, rash.   Neurology: negative for, dizziness, headache, tingling/numbness, tremors.   Psych: satisfied with life; negative for, anxiety, depression, suicidal thoughts.     PHYSICAL EXAM:    Vitals:    03/14/17 1049   BP: 134/77   Pulse: 77   Temp: 98 °F (36.7 °C)     Body mass index is 23.02 kg/(m^2).    General: Alert, cooperative, no distress, appears stated age  Head: Normocephalic, without obvious abnormality, atraumatic  Neck: no masses, no thyromegaly, no lymphadenopathy  Eyes: PERRL, conjunctiva/corneas clear  Lungs: Respirations unlabored, normal effort, no accessory muscle use  CV: Warm and well perfused extremities  Abdomen: Soft, non-tender, no CVA tenderness, no hepatosplenomegaly, no hernia  Penis: phallus normal, uncircumcised, well cared for, no plaques or lesions.   Scrotum: no cysts, no lesions, no rash, no hydrocele.   Epididymes: normal, nontender, symmetrical, no masses or cysts.   Testes: normal, both descended, no masses.   Urethra: palpably normal with orthotopic meatus of normal size    ERNESTO: normal sphincter tone, no masses, mild hemmorrhoids   PROSTATE: 15g, no nodules, non-tender, symmetrical, though firm.   Extremities: Extremities normal, atraumatic, no cyanosis or edema  Skin: Normal color, texture, and turgor, no rashes or lesions  Psych: Appropriate, well oriented, normal affect, normal mood  Neuro:  Non-focal    Assessment/Diagnosis:    1. Prostate cancer         Plans:  We discussed robotic radical prostatectomy. The procedure in general including hospital stay and postoperative process were discussed in detail. Risks of surgery were discussed including but not limited to up-front urinary incontinence and erectile dysfunction which we will work to overcome with kegel excercises and any number of ED therapies.       He had several good questions which were all answered in detail. He understands that after surgery he will have an indwelling leonard catheter for 7-10 days, and should not drive during this time period. Risks of surgery were discussed including but not limited to urinary incontinence, erectile dysfunction, injury to intraabdominal structures, urine leak, anastamotic leak, rectal injury, damage to ureters, hernia, postoperative ileus. The need for monitoring his PSA postoperatively was also discussed with the patient. Any adjuvant treatment, including hormonal and/or radiation treatment may be dependent on his final pathology report, including final isaiah score, margin status, extracapsular invasion, or seminal vesicle invasion. Such adjuvant therapies may also be necessary in the case of postoperative psa rise.    Prior to scheduling surgery, it is prudent he has full cardiac evaluation and workup to be cleared for surgery.  I would also ask Dr Merrill to evaluate for stability and control of his chronic medical problems such as his diabetes, especially in the face of rising A1c. At this time, will have him RTC 1 month to discuss plan after clearances. Will follow up on cardiology and PCP recs.    40 mins spent in encounter, over half in counseling.

## 2017-03-14 NOTE — MR AVS SNAPSHOT
[facility]  [address]  [place] [address]  Phone: [phone]                  Nilton Sherwood   3/14/2017 10:45 AM   Office Visit    Description:  Male : 1945   Provider:  Narinder Her MD   Department:  [facility]           Reason for Visit     Prostate Cancer                To Do List           Future Appointments        Provider Department Dept Phone    2017 10:30 AM [facility] [phone]      Goals (5 Years of Data)     None      Follow-Up and Disposition     Return in about 4 weeks (around 2017).      [facility] On [facility]     [facility] On Call Nurse [facility] -  Assistance  Registered nurses in the [facility] On Call Center provide clinical advisement, health education, appointment booking, and other advisory services.  Call for this free service at [phone].             Medications           Message regarding Medications     Verify the changes and/or additions to your medication regime listed below are the same as discussed with your clinician today.  If any of these changes or additions are incorrect, please notify your healthcare provider.             Verify that the below list of medications is an accurate representation of the medications you are currently taking.  If none reported, the list may be blank. If incorrect, please contact your healthcare provider. Carry this list with you in case of emergency.           Current Medications     aspirin (ECOTRIN) 81 MG EC tablet Take 81 mg by mouth once daily.    isosorbide mononitrate (IMDUR) 30 MG 24 hr tablet Take 1 tablet (30 mg total) by mouth once daily.    metformin (GLUCOPHAGE) 500 MG tablet Take 1 tablet (500 mg total) by mouth 2 (two) times daily with meals.    simvastatin (ZOCOR) 40 MG tablet Take 1 tablet (40 mg total) by mouth every evening.           Clinical Reference Information           Your Vitals Were     BP Pulse Temp Height Weight  "BMI    134/77 (BP Location: Right arm, Patient Position: Sitting, BP Method: Automatic) 77 98 °F (36.7 °C) (Oral) 5' 7" (1.702 m) 66.7 kg (147 lb) 23.02 kg/m2      Blood Pressure          Most Recent Value    BP  134/77      Allergies as of 3/14/2017     No Known Allergies      Immunizations Administered on Date of Encounter - 3/14/2017     None      Smoking Cessation     If you would like to quit smoking:   You may be eligible for free services if you are a [place] resident and started smoking cigarettes before September 1, 1988.  Call the Smoking Cessation [employer] (SCT) toll free at (581) 630-8718 or (868) 081-1730.   Call 1-800-QUIT-NOW if you do not meet the above criteria.            Language Assistance Services     ATTENTION: Language assistance services are available, free of charge. Please call [phone].      ATENCIÓN: Si habla español, tiene a julian disposición servicios gratuitos de asistencia lingüística. Llame al [phone].     CHÚ Ý: N?u b?n nói Ti?ng Vi?t, có các d?ch v? h? tr? ngôn ng? mi?n phí dành cho b?n. G?i s? [phone].         [facility] complies with applicable Federal civil rights laws and does not discriminate on the basis of race, color, national origin, age, disability, or sex.        "

## 2017-03-22 ENCOUNTER — TELEPHONE (OUTPATIENT)
Dept: UROLOGY | Facility: CLINIC | Age: 72
End: 2017-03-22

## 2017-03-22 NOTE — TELEPHONE ENCOUNTER
Spoke with Pt. Pt states he is having Cardiac Cath done tomorrow @ [facility] to check for any blockages. Dr Bartolome Campa is the Cardiologist, Ms [place] ([phone]). Will call to request records.

## 2017-03-22 NOTE — TELEPHONE ENCOUNTER
----- Message from Narinder Her MD sent at 3/18/2017  5:48 PM CDT -----  Please follow up with patient to see which cardiologist he saw and when, and make sure we get records from them

## 2017-04-07 ENCOUNTER — TELEPHONE (OUTPATIENT)
Dept: UROLOGY | Facility: CLINIC | Age: 72
End: 2017-04-07

## 2017-04-07 NOTE — TELEPHONE ENCOUNTER
Pt called to update his appt status with cardiology. Pt states he has appt on 4/11-4/12 for a Viablity study with Dr Bartolome Campa.  If all goes well appt on 4/19 with Dr Hartman, cardiac surgeon for bypass surgery.     Pt has appt with Dr Her on 4/17/17.

## 2017-04-17 ENCOUNTER — OFFICE VISIT (OUTPATIENT)
Dept: UROLOGY | Facility: CLINIC | Age: 72
End: 2017-04-17
Payer: COMMERCIAL

## 2017-04-17 VITALS
DIASTOLIC BLOOD PRESSURE: 71 MMHG | HEART RATE: 76 BPM | HEIGHT: 67 IN | BODY MASS INDEX: 22.44 KG/M2 | TEMPERATURE: 98 F | SYSTOLIC BLOOD PRESSURE: 122 MMHG | WEIGHT: 143 LBS

## 2017-04-17 DIAGNOSIS — C61 PROSTATE CANCER: Primary | ICD-10-CM

## 2017-04-17 DIAGNOSIS — I25.10 CORONARY ARTERY DISEASE INVOLVING NATIVE CORONARY ARTERY OF NATIVE HEART, ANGINA PRESENCE UNSPECIFIED: ICD-10-CM

## 2017-04-17 PROCEDURE — 99999 PR PBB SHADOW E&M-EST. PATIENT-LVL III: CPT | Mod: PBBFAC,,, | Performed by: UROLOGY

## 2017-04-17 PROCEDURE — 99213 OFFICE O/P EST LOW 20 MIN: CPT | Mod: S$GLB,,, | Performed by: UROLOGY

## 2017-04-17 NOTE — PROGRESS NOTES
[facility] Urology Progress Note      Nilton Sherwood is a 71 y.o. male who presents for follow up of prostate cancer.     He was originally referred to our practice by Dr Merrill when his psa was found to be 7.0, which was a rise from 4.2 in . He does have a history of prostate biopsy in  by  at [facility]. Trus bx results 14 were initially benign then amended to report ASAP in [place] and [place].     Biopsy by Dr Rodriguez on 17 revealed 23.7g gland with nodule/mass seen on ultrasound at [facility] and targeted with extra biopsy cores, which were positive.  4 core positive isaiah 3+3=6, including at area of suspicion on ultrasound.   9% 2 cores LB, 30% 1 core L TZ, 26% 1 core [place]      Despite his low grade pathology, he has been recommended to undergo treatment given his history of elevated psa and fairly exophytic lesion on ultrasound increasing risk for extraprostatic extension. He saw Dr Garcia on 17 to discuss radiation therapy, and saw me 3/14/17 to discuss surgical management, specifically robotic prostatectomy, which he preferred to proceed with. AUA SS . Not sexually active.    He did however present to Dr Merrill's office on 3/10 with acute onset sudden L severe shoulder pain. In office EKG reportedly abnormal and different than previous EKGs on file and he was sent to [facility] for cardiac evaluation, where he was admitted for 3 days. He was found to have a new LBBB, and an EF= 35% with cardiomyopathy with new wall motion abnormalities.  +DM (A1c 8.8 ), HTN, smoker of 8 cigs/d, and strong family history of CAD (brother had MI age 40, mother  of CHF).      He had cardiac cath done 3/23. He reports at least 3-vessel disease and needs triple bypass. He had viability study last week and has appointment with CT surgeon  to plan cardiac bypass surgery due to findings.   Bartolome Kidd - [place] - [facility] cardiology, Vick Hartman - [place] - CT surg      ROS: A  comprehensive 10 system review was performed and is negative except as noted above in HPI    PHYSICAL EXAM:    Vitals:    04/17/17 1039   BP: 122/71   Pulse: 76   Temp: 98.3 °F (36.8 °C)     Body mass index is 22.4 kg/(m^2).    General: Alert, cooperative, no distress, appears stated age   Head: Normocephalic, without obvious abnormality, atraumatic   Eyes: PERRL, conjunctiva/corneas clear   Lungs: Respirations unlabored   Heart: Warm and well perfused   Abdomen: soft NT ND  Extremities: Extremities normal, atraumatic, no cyanosis or edema   Skin: Skin color, texture, turgor normal, no rashes or lesions   Psych: Appropriate   Neurologic: Non-focal       ASSESSMENT   1. Prostate cancer     2. Coronary artery disease involving native coronary artery of native heart, angina presence unspecified         Plan    Given his cardiac status, at this time he is not a surgical candidate an his heart takes precedence and he should proceed with CABG.  Given his isaiah 6 pathology, we did discuss active surveillance, though there is concern about his exophytic nodule which was positive, and also discussed surgery when cardiac cleared post CABG, as well as proceeding with radiation. He lives in [place] and does not feel radiation back and forth multiple times per week to [place] is practical for him, and he prefers surgical excision.  Did explain this would have to be at least 60d post-CABG, but possibly more to be cardiac cleared and off anticoagulation or be cleared to safely hold it. Advised given time interval which would pass, that all positive cores are left sided, and his exophytic lesion is left sided, would likely plan on left wide excision with planned nerve sparing on right. He is agreeable to this plan, and will follow up with him post-CABG. 3 month appointment set to have follow up planned, but pt to call after cardiac postop follow up.

## 2017-04-20 ENCOUNTER — PATIENT MESSAGE (OUTPATIENT)
Dept: UROLOGY | Facility: CLINIC | Age: 72
End: 2017-04-20

## 2017-05-18 PROBLEM — Z95.1 S/P CABG X 3: Status: ACTIVE | Noted: 2017-05-18

## 2017-05-18 PROBLEM — I25.119 CORONARY ARTERY DISEASE INVOLVING NATIVE CORONARY ARTERY OF NATIVE HEART WITH ANGINA PECTORIS: Status: ACTIVE | Noted: 2017-05-18

## 2017-06-15 PROBLEM — Z00.01 ENCOUNTER FOR GENERAL ADULT MEDICAL EXAMINATION WITH ABNORMAL FINDINGS: Status: ACTIVE | Noted: 2017-06-15

## 2017-06-27 PROBLEM — M25.612 DECREASED ROM OF LEFT SHOULDER: Status: RESOLVED | Noted: 2017-03-10 | Resolved: 2017-06-27

## 2017-06-29 PROBLEM — E53.8 VITAMIN B12 DEFICIENCY: Status: ACTIVE | Noted: 2017-06-29

## 2017-07-04 PROBLEM — M25.512 LEFT SHOULDER PAIN: Status: RESOLVED | Noted: 2017-03-10 | Resolved: 2017-07-04

## 2017-07-17 PROBLEM — D72.829 LEUKOCYTOSIS: Status: ACTIVE | Noted: 2017-07-17

## 2017-07-23 NOTE — PROGRESS NOTES
[facility] Urology Progress Note    Nilton Sherwood is a 71 y.o. male who presents for follow up of prostate cancer.     He was originally referred to our practice by Dr Merrill when his psa was found to be 7.0, which was a rise from 4.2 in . He does have a history of prostate biopsy in  by  at [facility]. Trus bx results 14 were initially benign then amended to report ASAP in [place] and [place].     Biopsy by Dr Rodriguez on 17 revealed 23.7g gland with nodule/mass seen on ultrasound at [place] and targeted with extra biopsy cores, which were positive.  4 core positive isaiah 3+3=6, including at area of suspicion on ultrasound.   9% 2 cores LB, 30% 1 core L TZ, 26% 1 core [place]     Despite his low grade pathology, he has been recommended to undergo treatment given his history of elevated psa and fairly exophytic lesion on ultrasound increasing risk for extraprostatic extension. He saw Dr Garcia on 17 to discuss radiation therapy, and saw me 3/14/17 to discuss surgical management, specifically robotic prostatectomy, which he preferred to proceed with. AUA SS . Not sexually active.     He did however present to Dr Merrill's office on 3/10 with acute onset sudden L severe shoulder pain. In office EKG reportedly abnormal and different than previous EKGs on file and he was sent to [facility] for cardiac evaluation, where he was admitted for 3 days. He was found to have a new LBBB, and an EF= 35% with cardiomyopathy with new wall motion abnormalities.  +DM (A1c 8.8 ), HTN, smoker of 8 cigs/d, and strong family history of CAD (brother had MI age 40, mother  of CHF).   He had cardiac cath done 3/23/17 and reported at least 3-vessel disease with need for needs triple bypass. He had viability study and met with CT surgeon cardiac bypass surgery.  Bartolome Campa - [place] - [facility] cardiology, Vick Hartman - [place] - CT surg    Record review from Dr Hartman indicated findings of 100%  "occlusion of proximal LAD, 100% occlusion of proximal RCA, and 100% occlusion of obtuse marginal and circumflex systems, as well as L BBB and ischemic cardiomyopathy with EF of 35%, in addition to his HLD and T2DM and smoking history. Only truly patent vessel noted was a large first marginal ramus which supplied the whole heart. On 5/2/17, he underwent CABGx3 with reverse saphenous vein grafts and use of LIMA placed in situ to LAD. Intraoperative MINESH noted mildly reduced LV function preo and postop with severe inferior hypokinesis, normal pericardium, trivial valvular abnormalities.    Recent follow up with Dr Campa on 7/11/17 notes no cardiac symptoms, chest pain, claudication etc. ACE/ARB not added at time of surgery due to hypotension and he is taking metoprolol daily and coreg BID. He is on plavix 75mg daily. He ws asked to stop his lopressor and continue coreg bid, lisinopril 2.5mg daily was added, and repeat echo ordered for August to assess LV function.  He is currently undergoing cardiac rehab, of which he has 8 weeks remaining.  Flomax has been controlling his urinary symptoms well and he needs refills.       ROS: A comprehensive 10 system review was performed and is negative except as noted above in HPI    PHYSICAL EXAM:    Vitals:    07/24/17 1353   BP: 107/61   Pulse: 94   Resp: 18   Temp: 97.7 °F (36.5 °C)     Body mass index is 22.86 kg/m². Weight: 66.2 kg (145 lb 15.1 oz) Height: 5' 7" (170.2 cm)       General: Alert, cooperative, no distress, appears stated age   Head: Normocephalic, without obvious abnormality, atraumatic   Eyes: PERRL, conjunctiva/corneas clear   Lungs: Respirations unlabored   Heart: Warm and well perfused   Abdomen: soft NT ND  Extremities: Extremities normal, atraumatic, no cyanosis or edema   Skin: Skin color, texture, turgor normal, no rashes or lesions   Psych: Appropriate   Neurologic: Non-focal         ASSESSMENT   1. Prostate cancer  Prostate Specific Antigen, Diagnostic "    Basic metabolic panel   2. Type 2 diabetes mellitus without complication, without long-term current use of insulin  Hemoglobin A1c       Plan    Given his cardiac status, at this time he is still not a surgical candidate. He did have successful revascularization procedure, though also did have ischemic cardiomyopathy with EF of 35% preop, so further cardiac assessment and recommendations are pending new echo after further cardiac rehabilitation.  Again noted his isaiah 6 pathology, with active surveillance as an option - though was not recommended due to exophytic prostate nodule of which biopsy cores were positive. He has however, essentially been placed on active surveillance due to his cardiac status. He is still interested in definitive local therapy, and prefers surgical management. He would need to be cardiac cleared as well as to be able to safely hold anticoagulation 5-7 days preop, and at least 3 days postop. Concerned that given his level of cardiomyopathy and poor EF, though clinically doing well post CABG he still may not be surgical candidate or cardiac cleared.     Again discussed radiation therapy. He continues to assert that EBRT/XRT is not a practical option for him due to time and travel commitment as he lives in [place], [place] and cares for his niece. He would be interested in discussing brachtherapy as an option, for which I again noted he would need to be able to hold blood thinners, but remains most interested in surgical management.  As he continues his cardiac rehabilitation, and he is functionally being actively surveilled, will recheck psa today as well as order 3T MRI prostate for further evaluation given his adenocarcinoma positive prostate nodule to evaluate for any clinically significant lesions and for extraprostatic extension of disease. Depending on results and timing of cardiac recovery, can further discuss therapeutic options, vs consider re-biopsy (with cognitive fusion from MRI  results) for continued active surveillance. As he will be getting psa drawn, will also get BMP to have renal function on file for his contrasted MRI and he would like his A1c checked in advance of followup with Dr Merrill, who I will CC. All questions answered, patient agreeable to plan.

## 2017-07-24 ENCOUNTER — OFFICE VISIT (OUTPATIENT)
Dept: UROLOGY | Facility: CLINIC | Age: 72
End: 2017-07-24
Payer: COMMERCIAL

## 2017-07-24 ENCOUNTER — LAB VISIT (OUTPATIENT)
Dept: LAB | Facility: HOSPITAL | Age: 72
End: 2017-07-24
Attending: UROLOGY
Payer: COMMERCIAL

## 2017-07-24 VITALS
HEIGHT: 67 IN | SYSTOLIC BLOOD PRESSURE: 107 MMHG | BODY MASS INDEX: 22.91 KG/M2 | TEMPERATURE: 98 F | RESPIRATION RATE: 18 BRPM | DIASTOLIC BLOOD PRESSURE: 61 MMHG | HEART RATE: 94 BPM | WEIGHT: 145.94 LBS

## 2017-07-24 DIAGNOSIS — C61 PROSTATE CANCER: ICD-10-CM

## 2017-07-24 DIAGNOSIS — E11.9 TYPE 2 DIABETES MELLITUS WITHOUT COMPLICATION, WITHOUT LONG-TERM CURRENT USE OF INSULIN: ICD-10-CM

## 2017-07-24 DIAGNOSIS — C61 PROSTATE CANCER: Primary | ICD-10-CM

## 2017-07-24 LAB
ANION GAP SERPL CALC-SCNC: 11 MMOL/L
BUN SERPL-MCNC: 16 MG/DL
CALCIUM SERPL-MCNC: 9.7 MG/DL
CHLORIDE SERPL-SCNC: 104 MMOL/L
CO2 SERPL-SCNC: 26 MMOL/L
COMPLEXED PSA SERPL-MCNC: 7.5 NG/ML
CREAT SERPL-MCNC: 1 MG/DL
EST. GFR  (AFRICAN AMERICAN): >60 ML/MIN/1.73 M^2
EST. GFR  (NON AFRICAN AMERICAN): >60 ML/MIN/1.73 M^2
GLUCOSE SERPL-MCNC: 108 MG/DL
POTASSIUM SERPL-SCNC: 4.5 MMOL/L
SODIUM SERPL-SCNC: 141 MMOL/L

## 2017-07-24 PROCEDURE — 3044F HG A1C LEVEL LT 7.0%: CPT | Mod: S$GLB,,, | Performed by: UROLOGY

## 2017-07-24 PROCEDURE — 83036 HEMOGLOBIN GLYCOSYLATED A1C: CPT

## 2017-07-24 PROCEDURE — 80048 BASIC METABOLIC PNL TOTAL CA: CPT

## 2017-07-24 PROCEDURE — 1126F AMNT PAIN NOTED NONE PRSNT: CPT | Mod: S$GLB,,, | Performed by: UROLOGY

## 2017-07-24 PROCEDURE — 84153 ASSAY OF PSA TOTAL: CPT

## 2017-07-24 PROCEDURE — 36415 COLL VENOUS BLD VENIPUNCTURE: CPT

## 2017-07-24 PROCEDURE — 1159F MED LIST DOCD IN RCRD: CPT | Mod: S$GLB,,, | Performed by: UROLOGY

## 2017-07-24 PROCEDURE — 99999 PR PBB SHADOW E&M-EST. PATIENT-LVL III: CPT | Mod: PBBFAC,,, | Performed by: UROLOGY

## 2017-07-24 PROCEDURE — 99214 OFFICE O/P EST MOD 30 MIN: CPT | Mod: S$GLB,,, | Performed by: UROLOGY

## 2017-07-24 RX ORDER — TAMSULOSIN HYDROCHLORIDE 0.4 MG/1
0.4 CAPSULE ORAL DAILY
Qty: 30 CAPSULE | Refills: 11 | Status: SHIPPED | OUTPATIENT
Start: 2017-07-24 | End: 2018-02-22 | Stop reason: SDUPTHER

## 2017-07-24 RX ORDER — CARVEDILOL 3.12 MG/1
3.12 TABLET ORAL
COMMUNITY
Start: 2017-05-07 | End: 2017-10-26

## 2017-07-24 NOTE — Clinical Note
Please send note and path from last biopsy and recent bmp with face sheet to [facility] with order for 3T mri of prostate so it can be scheduled

## 2017-07-25 LAB
ESTIMATED AVG GLUCOSE: 148 MG/DL
HBA1C MFR BLD HPLC: 6.8 %

## 2017-07-31 PROBLEM — D64.9 ANEMIA: Status: ACTIVE | Noted: 2017-07-31

## 2017-07-31 PROBLEM — R73.09 HEMOGLOBIN A1C LESS THAN 7.0%: Status: ACTIVE | Noted: 2017-07-31

## 2017-08-03 ENCOUNTER — TELEPHONE (OUTPATIENT)
Dept: UROLOGY | Facility: CLINIC | Age: 72
End: 2017-08-03

## 2017-08-03 NOTE — TELEPHONE ENCOUNTER
Returned call to [facility] to [place]. Requested PSA level to be faxed over pt has appt today. PSA sent via fax.

## 2017-08-15 ENCOUNTER — PATIENT MESSAGE (OUTPATIENT)
Dept: UROLOGY | Facility: CLINIC | Age: 72
End: 2017-08-15

## 2017-09-18 PROBLEM — Z00.01 ENCOUNTER FOR GENERAL ADULT MEDICAL EXAMINATION WITH ABNORMAL FINDINGS: Status: RESOLVED | Noted: 2017-06-15 | Resolved: 2017-09-18

## 2017-10-01 ENCOUNTER — TELEPHONE (OUTPATIENT)
Dept: UROLOGY | Facility: CLINIC | Age: 72
End: 2017-10-01

## 2017-10-01 NOTE — TELEPHONE ENCOUNTER
8/23/17 3T prostate MRI:    30mL prostate with no intravesical extension.  No index lesion in peripheral zone. PIRAD2.  Otherwise minor prostatitis like bands in PZ  TZ: focal lesion involving TZ at level of L lateral mid gland with decd T2 signal intensity with ill defined margins and abnormally decd ADC signal intensity with no hyperintensity on the DWI sequence or focal hyperemia. 0.1mL ([id number]). No extension into peripheral zone. PIRAD3.  No SVI, No LAD

## 2017-10-27 ENCOUNTER — OFFICE VISIT (OUTPATIENT)
Dept: UROLOGY | Facility: CLINIC | Age: 72
End: 2017-10-27
Payer: COMMERCIAL

## 2017-10-27 VITALS
TEMPERATURE: 98 F | SYSTOLIC BLOOD PRESSURE: 121 MMHG | HEIGHT: 67 IN | DIASTOLIC BLOOD PRESSURE: 77 MMHG | WEIGHT: 145.94 LBS | BODY MASS INDEX: 22.91 KG/M2 | HEART RATE: 76 BPM

## 2017-10-27 DIAGNOSIS — I25.5 ISCHEMIC CARDIOMYOPATHY: ICD-10-CM

## 2017-10-27 DIAGNOSIS — C61 MALIGNANT NEOPLASM OF PROSTATE: Primary | ICD-10-CM

## 2017-10-27 LAB
BILIRUB SERPL-MCNC: ABNORMAL MG/DL
BLOOD URINE, POC: ABNORMAL
COLOR, POC UA: YELLOW
GLUCOSE UR QL STRIP: 100
KETONES UR QL STRIP: ABNORMAL
LEUKOCYTE ESTERASE URINE, POC: ABNORMAL
NITRITE, POC UA: ABNORMAL
PH, POC UA: 5
PROTEIN, POC: ABNORMAL
SPECIFIC GRAVITY, POC UA: 1.01
UROBILINOGEN, POC UA: ABNORMAL

## 2017-10-27 PROCEDURE — 99999 PR PBB SHADOW E&M-EST. PATIENT-LVL III: CPT | Mod: PBBFAC,,, | Performed by: UROLOGY

## 2017-10-27 PROCEDURE — 99214 OFFICE O/P EST MOD 30 MIN: CPT | Mod: 25,S$GLB,, | Performed by: UROLOGY

## 2017-10-27 PROCEDURE — 81002 URINALYSIS NONAUTO W/O SCOPE: CPT | Mod: S$GLB,,, | Performed by: UROLOGY

## 2017-10-27 RX ORDER — LISINOPRIL 2.5 MG/1
TABLET ORAL
COMMUNITY
Start: 2017-10-08 | End: 2018-06-04 | Stop reason: SDUPTHER

## 2017-10-27 NOTE — PROGRESS NOTES
[facility] Urology Progress Note    Nilton Sherwood is a 72 y.o. male who presents for follow up of prostate cancer.     He was originally referred to our practice by Dr Merrill when his psa was found to be 7.0, which was a rise from 4.2 in . He does have a history of prostate biopsy in  by  at [facility]. Trus bx results 14 were initially benign then amended to report ASAP in [place] and [place].    Biopsy by Dr Rodriguez on 17 revealed 23.7g gland with nodule/mass seen on ultrasound at [place] and targeted with extra biopsy cores, which were positive.  4 core positive isaiah 3+3=6, including at area of suspicion on ultrasound.   9% 2 cores LB, 30% 1 core L TZ, 26% 1 core [place]    Despite his low grade pathology, he was recommended to undergo treatment given his history of elevated psa and fairly exophytic lesion on ultrasound increasing risk for extraprostatic extension.   He saw Dr Garcia on 17 to discuss radiation therapy, and saw me 3/14/17 to discuss surgical management, specifically robotic prostatectomy, which he preferred to proceed with. AUA SS . Not sexually active.  He did however present to Dr Merrill's office on 3/10 with acute onset sudden L severe shoulder pain. In office EKG reportedly abnormal and different than previous EKGs on file and he was sent to [facility] for cardiac evaluation, where he was admitted for 3 days. He was found to have a new LBBB, and an EF= 35% with cardiomyopathy with new wall motion abnormalities.  +DM (A1c 8.8 ), HTN, smoker of 8 cigs/d, and strong family history of CAD (brother had MI age 40, mother  of CHF).     He had cardiac cath done 3/23/17 and reported at least 3-vessel disease (100% occlusion of proximal LAD, 100% occlusion of proximal RCA, and 100% occlusion of obtuse marginal and circumflex systems) /Only truly patent vessel noted was a large first marginal ramus which supplied the whole heart. On 17, he  underwent CABGx3 with reverse saphenous vein grafts and use of LIMA placed in situ to LAD. Intraoperative MINESH noted mildly reduced LV function preo and postop with severe inferior hypokinesis, normal pericardium, trivial valvular abnormalities.  Bartolome Campa - [place] - [facility] cardiology, Vick Hartman - [place] - CT surg    I last saw him on 7/24/11 at which time he had recent fu with  Dr Campa on 7/11/17 asymptomatic and actively participating in cardiac rehab. ACE/ARB not added at time of surgery due to hypotension and he is taking metoprolol daily and coreg BID. He is on plavix 75mg daily. He ws asked to stop his lopressor and continue coreg bid, lisinopril 2.5mg daily was added, and repeat echo ordered for August to assess LV function. Flomax had been controlling his urinary symptoms well and he needs refills.  As he had essentially been placed on active surveillance due to his cardiac status, rechecked psa that visit (7.5 on 7/24/17) and got 3T MRI given his adenocarcinoma positive prostate nodule to evaluate for any clinically significant lesions and for extraprostatic extension of disease. He was still interested in definitive local therapy, and prefers surgical management, though noted given his CM and poor EF he may not be a candidate.     3T MRI 8/23/17: 30mL prostate with no intravesical extension. No index lesion in peripheral zone. PIRAD2. Otherwise minor prostatitis like bands in PZ  TZ: focal lesion involving TZ at level of L lateral mid gland with decd T2 signal intensity with ill defined margins and abnormally decd ADC signal intensity with no hyperintensity on the DWI sequence or focal hyperemia. 0.1mL ([id number]). No extension into peripheral zone. PIRAD3. No SVI, No LAD    He returns today noting his cardiac status continues to devolve, stating that his EF is down to 25% and cardiology had discussed implanting defibrillator. He has fu with Dr Campa scheduled 10/31/17.   He is working on smoking  "cessation and down to about 3 cigarettes per day.  Notes that although his cardiac surgery was in May, he is just now starting to feel like he had cardiac surgery.  Denies hematuria or dysuria.  Urinary symptoms are well controlled on Flomax, no complaints  Most recent PSA as above on 7/24/17 at 7.5      ROS: A comprehensive 10 system review was performed and is negative except as noted above in HPI    PHYSICAL EXAM:    Vitals:    10/27/17 1007   BP: 121/77   Pulse: 76   Temp: 98.4 °F (36.9 °C)     Body mass index is 22.86 kg/m². Weight: 66.2 kg (145 lb 15.1 oz) Height: 5' 7" (170.2 cm)       General: Alert, cooperative, no distress, appears stated age   Head: Normocephalic, without obvious abnormality, atraumatic   Eyes: PERRL, conjunctiva/corneas clear   Lungs: Respirations unlabored   Heart: Warm and well perfused   Abdomen: soft NT ND  Extremities: Extremities normal, atraumatic, no cyanosis or edema   Skin: Skin color, texture, turgor normal, no rashes or lesions   Psych: Appropriate   Neurologic: Non-focal       Recent Results (from the past 336 hour(s))   CBC auto differential    Collection Time: 10/17/17  8:31 AM   Result Value Ref Range    WBC 8.1 3.8 - 10.8 Thousand/uL    RBC 4.67 4.20 - 5.80 Million/uL    Hemoglobin 13.0 (L) 13.2 - 17.1 g/dL    Hematocrit 40.2 38.5 - 50.0 %    MCV 86.1 80.0 - 100.0 fL    MCH 27.8 27.0 - 33.0 pg    MCHC 32.3 32.0 - 36.0 g/dL    RDW 15.2 (H) 11.0 - 15.0 %    Platelets 220 140 - 400 Thousand/uL    MPV 11.3 7.5 - 12.5 fL    Neutrophils Absolute 4,204 1,500 - 7,800 cells/uL    Lymph # 2,430 850 - 3,900 cells/uL    Mono # 608 200 - 950 cells/uL    Eos # 778 (H) 15 - 500 cells/uL    Baso # 81 0 - 200 cells/uL    Neutrophils Relative 51.9 %    Lymph% 30.0 %    Mono% 7.5 %    Eosinophil% 9.6 %    Basophil% 1.0 %   POCT URINE DIPSTICK WITHOUT MICROSCOPE    Collection Time: 10/27/17 10:10 AM   Result Value Ref Range    Color, UA yellow     Spec Grav UA 1.010     pH, UA 5.0     WBC, " UA neg     Nitrite, UA neg     Protein neg     Glucose,      Ketones, UA neg     Urobilinogen, UA neg     Bilirubin neg     Blood, UA neg        ASSESSMENT   1. Malignant neoplasm of prostate  POCT URINE DIPSTICK WITHOUT MICROSCOPE    Prostate Specific Antigen, Diagnostic   2. Ischemic cardiomyopathy         Plan    Given his significant cardiomyopathy and poor ejection fraction, at this time we again discussed she is not a surgical candidate.  With revascularization from recent CABG and potential future defibrillator, he is felt to be a good surgical candidate can discuss at that time.  He is still not interested in radiotherapy with EBRT/XRT at this time due to practical and lifestyle concerns such as distance of travel and family commitments.  Slight interested in brachytherapy, mostly as he is interested in treating his prostate cancer in the future.  We did review his [facility] risk profile, noting that this is a preoperative nomogram for radical prostatectomy, though using a to risk stratify his disease.  He was noted to have a 44% chance of extracapsular extension, with only 1% chance of lymph node involvement and 2% chance of seminal vesicle involvement.  We did discuss that given his recent MRI having no PI-RADS 4-5 lesions or any evidence of any extracapsular disease, he likely falls within the organ confined disease and with his Letart 6 pathology is still a candidate for active surveillance, which he has essentially been placed on as a result of his cardiac status.  Given his low-grade disease and risk profile, less likely to have clinical effects of prostate cancer and lifetime given significant comorbidities, and may not need treatment despite biopsy-positive ultrasound to determine nodule, which is not clinically appreciated on MRI.  By the standards he is a good candidate for active surveillance, though at this time active surveillance is his only option without starting radiotherapy given his  cardiac status.  On that note, we'll have him return to clinic in 6 months with a repeat PSA to reevaluate his current cardiac status and plan for additional prostate biopsy assuming he is clear to blood thinners for such procedure.  Given her relatively negative MRI, would potentially feel comfortable pushing biopsy to 1 year post MRI should his PSA remains stable.  Prior to next visit, he should have PSA drawn, and we will request is up-to-date cardiology notes from Dr. Campa.

## 2017-10-30 PROBLEM — D72.829 LEUKOCYTOSIS: Status: RESOLVED | Noted: 2017-07-17 | Resolved: 2017-10-30

## 2018-02-25 RX ORDER — TAMSULOSIN HYDROCHLORIDE 0.4 MG/1
CAPSULE ORAL
Qty: 90 CAPSULE | Refills: 5 | Status: SHIPPED | OUTPATIENT
Start: 2018-02-25 | End: 2019-01-30

## 2018-04-26 PROBLEM — R73.9 HEMOGLOBIN A1C BETWEEN 7.0% AND 9.0%: Status: ACTIVE | Noted: 2017-07-31

## 2018-04-26 PROBLEM — Z82.49 FAMILY HISTORY OF HEART DISEASE: Status: RESOLVED | Noted: 2017-03-13 | Resolved: 2018-04-26

## 2018-04-26 PROBLEM — M75.32 CALCIFIC TENDONITIS OF LEFT SHOULDER: Status: RESOLVED | Noted: 2017-03-13 | Resolved: 2018-04-26

## 2018-04-26 PROBLEM — M19.019 AC JOINT ARTHROPATHY: Status: RESOLVED | Noted: 2017-03-13 | Resolved: 2018-04-26

## 2018-04-27 ENCOUNTER — OFFICE VISIT (OUTPATIENT)
Dept: UROLOGY | Facility: CLINIC | Age: 73
End: 2018-04-27
Payer: COMMERCIAL

## 2018-04-27 VITALS
HEART RATE: 70 BPM | BODY MASS INDEX: 22.84 KG/M2 | RESPIRATION RATE: 18 BRPM | HEIGHT: 67 IN | WEIGHT: 145.5 LBS | SYSTOLIC BLOOD PRESSURE: 125 MMHG | DIASTOLIC BLOOD PRESSURE: 68 MMHG

## 2018-04-27 DIAGNOSIS — C61 MALIGNANT NEOPLASM OF PROSTATE: Primary | ICD-10-CM

## 2018-04-27 LAB
BILIRUB SERPL-MCNC: ABNORMAL MG/DL
BLOOD URINE, POC: ABNORMAL
COLOR, POC UA: CLEAR
GLUCOSE UR QL STRIP: 250
KETONES UR QL STRIP: ABNORMAL
LEUKOCYTE ESTERASE URINE, POC: ABNORMAL
NITRITE, POC UA: ABNORMAL
PH, POC UA: 5
PROTEIN, POC: ABNORMAL
SPECIFIC GRAVITY, POC UA: 1.01
UROBILINOGEN, POC UA: ABNORMAL

## 2018-04-27 PROCEDURE — 99214 OFFICE O/P EST MOD 30 MIN: CPT | Mod: 25,S$GLB,, | Performed by: UROLOGY

## 2018-04-27 PROCEDURE — 81002 URINALYSIS NONAUTO W/O SCOPE: CPT | Mod: S$GLB,,, | Performed by: UROLOGY

## 2018-04-27 PROCEDURE — 99999 PR PBB SHADOW E&M-EST. PATIENT-LVL IV: CPT | Mod: PBBFAC,,, | Performed by: UROLOGY

## 2018-04-27 RX ORDER — CIPROFLOXACIN 500 MG/1
500 TABLET ORAL 2 TIMES DAILY
Qty: 6 TABLET | Refills: 0 | Status: SHIPPED | OUTPATIENT
Start: 2018-04-27 | End: 2018-05-29 | Stop reason: ALTCHOICE

## 2018-04-27 NOTE — PROGRESS NOTES
[facility] Urology Progress Note    Nilton Sherwood is a 72 y.o. male who presents for prostate cancer follow up     He was originally referred to our practice by Dr Merrill when his psa was found to be 7.0, which was a rise from 4.2 in . He does have a history of prostate biopsy in  by  at [facility]. Trus bx results 14 were initially benign then amended to report ASAP in [place] and [facility].   Biopsy by Dr Rodriguez on 17 revealed 23.7g gland with nodule/mass seen on ultrasound at [facility] and targeted with extra biopsy cores, which were positive.  4 core positive isaiah 3+3=6, including at area of suspicion on ultrasound.   9% 2 cores LB, 30% 1 core L TZ, 26% 1 core [place]     Despite his low grade pathology, he was recommended to undergo treatment given his history of elevated psa and fairly exophytic lesion on ultrasound increasing risk for extraprostatic extension.   He saw Dr Garcia on 17 to discuss radiation therapy, and saw me 3/14/17 to discuss surgical management, specifically robotic prostatectomy, which he preferred to proceed with. AUA SS . Not sexually active.  He did however present to Dr Merrill's office on 3/10 with acute onset sudden L severe shoulder pain. In office EKG reportedly abnormal and different than previous EKGs on file and he was sent to [facility] for cardiac evaluation, where he was admitted for 3 days. He was found to have a new LBBB, and an EF= 35% with cardiomyopathy with new wall motion abnormalities.  +DM (A1c 8.8 ), HTN, smoker of 8 cigs/d, and strong family history of CAD (brother had MI age 40, mother  of CHF).    He had cardiac cath done 3/23/17 and reported at least 3-vessel disease (100% occlusion of proximal LAD, 100% occlusion of proximal RCA, and 100% occlusion of obtuse marginal and circumflex systems) /Only truly patent vessel noted was a large first marginal ramus which supplied the whole heart. On 17, he underwent  CABGx3 with reverse saphenous vein grafts and use of LIMA placed in situ to LAD. Intraoperative MINESH noted mildly reduced LV function preo and postop with severe inferior hypokinesis, normal pericardium, trivial valvular abnormalities.   Bartolome Campa - [place] - [facility] cardiology, Vick Hartman - [place] - [place] surg     As of 7/24/17, he had was asymptomatic and actively participating in cardiac rehab. ACE/ARB not added at time of surgery due to hypotension and he is taking metoprolol daily and coreg BID. + plavix 75mg daily.   Flomax had been controlling his urinary symptoms well and he needs refills.  As he had essentially been placed on active surveillance due to his cardiac status, rechecked psa that visit (7.5 on 7/24/17) and got 3T MRI given his adenocarcinoma positive prostate nodule to evaluate for any clinically significant lesions and for extraprostatic extension of disease. He was still interested in definitive local therapy, and prefers surgical management, though noted given his CM and poor EF he may not be a candidate.    3T MRI 8/23/17: 30mL prostate with no intravesical extension. No index lesion in peripheral zone. PIRAD2. Otherwise minor prostatitis like bands in PZ  TZ: focal lesion involving TZ at level of L lateral mid gland with decd T2 signal intensity with ill defined margins and abnormally decd ADC signal intensity with no hyperintensity on the DWI sequence or focal hyperemia. 0.1mL ([id number]). No extension into peripheral zone. PIRAD3. No SVI, No LAD     I last saw him in October 2017 noting his cardiac status continues to devolve, stating that his EF is down to 25% and cardiology had discussed implanting defibrillator. working on smoking cessation and down to about 3 cigarettes per day.  Notes that although his cardiac surgery was in May, he is just now starting to feel like he had cardiac surgery.  Discussed active surveillance again with 6 mos repeat psa and confirmatory re-biopsy, and if  "remained low grade low risk prostate cancer to continue surveillance     He returns today with psa of 6.3 on 4/20/18  Udip negative except for trace blood and 250 glucose  He did most recently see his cardiologist, Dr. Bartolome Campa, on 4/19/18.  He did have echo 2/27/18 revealing ejection fraction of 20-25% despite appropriate medical therapy and therefore had BiV AICD implantation on 3/21/18 with Dr. Gonzalez for ischemic cardiomyopathy and left bundle branch block.  Still having some swelling to the site  Had LANNY to pCirc and left main as well as PCI to mCirc, necessitating anticoagulation which is compliant with.  His urinary symptoms remain well controlled on Flomax.  No hematuria dysuria  No new back pain or bone pain      ROS: A comprehensive 10 system review was performed and is negative except as noted above in HPI    PHYSICAL EXAM:    Vitals:    04/27/18 1024   BP: 125/68   Pulse: 70   Resp: 18     Body mass index is 22.79 kg/m². Weight: 66 kg (145 lb 8.1 oz) Height: 5' 7" (170.2 cm)       General: Alert, cooperative, no distress, appears stated age   Head: Normocephalic, without obvious abnormality, atraumatic   Eyes: PERRL, conjunctiva/corneas clear   Lungs: Respirations unlabored   Heart: Warm and well perfused   Abdomen: Soft, nontender, nondistended  Extremities: Extremities normal, atraumatic, no cyanosis or edema   Skin: Skin color, texture, turgor normal, no rashes or lesions   Psych: Appropriate   Neurologic: Non-focal       Recent Results (from the past 336 hour(s))   PSA, Screening    Collection Time: 04/20/18  8:51 AM   Result Value Ref Range    PROSTATE SPECIFIC ANTIGEN, SCR - QUEST 6.3 (H) < OR = 4.0 ng/mL   POCT URINE DIPSTICK WITHOUT MICROSCOPE    Collection Time: 04/27/18 10:28 AM   Result Value Ref Range    Color, UA clear     Spec Grav UA 1.010     pH, UA 5.0     WBC, UA neg     Nitrite, UA neg     Protein trace     Glucose,      Ketones, UA neg     Urobilinogen, UA neg     Bilirubin neg "     Blood, UA neg        ASSESSMENT   1. Malignant neoplasm of prostate  POCT URINE DIPSTICK WITHOUT MICROSCOPE    Case Request Operating Room: TRANSRECTAL ULTRASOUND GUIDED PROSTATE BIOPSY    Place in Outpatient    Vital Signs     Notify physician        Plan    As it has now been 14 months since his original biopsy with low risk [place] 6 disease, and his cardiac status has not improved, with declining ejection fraction and recent need for AICD placement and chronic anticoagulation after PCI, he remains a very high risk surgical candidate.  Before further treatment discussion, revisiting the topic of radiotherapy, advised repeating biopsy as a confirmatory 1 year active surveillance biopsy, targeting areas of previous positivity, to get updated picture of his prostate cancer disease burden.  Encouraging that his PSA is stable from last check, however both of these areas represented increased from time of diagnosis.    I reviewed the details of a transrectal ultrasound-guided biopsy of the prostate, including use of local anesthesia.  Complications include bleeding, fever and chills. He was also instructed to watch for any signs of fever. If he does have any fever or chills after, he was advised to come to the emergency room right away for intravenous antibiotics and possible admission to the hospital. He is to refrain from any strenuous activity including sexual activity for the next 72 hours after biopsy. He was also advised that he may have blood while urinating, during bowel movements as well as during ejaculations.   He was given a prebiopsy/postbiopsy instruction sheet was reminding him to avoid aspirin and blood thinners for 7 days prior, take the Rxed antibiotics the day before, day of, day after biopsy, and perform a fleet enema at home morning of biopsy. All questions he had were answered.     TRUS[facility]biopsy scheduled at [facility] on 5/15/18   As the patient is chronically anticoagulated, it will be necessary to  hold Plavix a minimum of 5, but preferably 7 days prior to prostate biopsy, and 2-3 days afterwards.  If felt necessary by his cardiologist, he can remain on aspirin 81 mg daily.  Will need cardiac clearance to proceed with plan for blood thinners, and patient be contacted after review by Dr. Campa with clearance recommendations.

## 2018-04-27 NOTE — Clinical Note
Please obtain cardiac clearance to hold bloothinners as outlined in note plan - send to dr grover landis and make f/u call to their office to get plan as would preferably start holding bloodthinners next tues

## 2018-05-04 RX ORDER — LIDOCAINE HYDROCHLORIDE 10 MG/ML
1 INJECTION INFILTRATION; PERINEURAL ONCE
Status: CANCELLED | OUTPATIENT
Start: 2018-05-04 | End: 2018-05-04

## 2018-05-04 RX ORDER — GENTAMICIN SULFATE 40 MG/ML
80 INJECTION, SOLUTION INTRAMUSCULAR; INTRAVENOUS ONCE
Status: CANCELLED | OUTPATIENT
Start: 2018-05-15

## 2018-05-07 ENCOUNTER — TELEPHONE (OUTPATIENT)
Dept: UROLOGY | Facility: CLINIC | Age: 73
End: 2018-05-07

## 2018-05-07 NOTE — TELEPHONE ENCOUNTER
Patient advised that Cardiology gave me verbal clearance that Dr. Campa signed to cleared him to hold his Plavix and aspirin for 7 days prior to surgery.  Still waiting for fax to be received.     Reviewed Prostate Biopsy Information for before procedure.

## 2018-05-15 ENCOUNTER — SURGERY (OUTPATIENT)
Age: 73
End: 2018-05-15

## 2018-05-15 ENCOUNTER — HOSPITAL ENCOUNTER (OUTPATIENT)
Facility: AMBULARY SURGERY CENTER | Age: 73
Discharge: HOME | End: 2018-05-15
Attending: UROLOGY | Admitting: UROLOGY
Payer: COMMERCIAL

## 2018-05-15 DIAGNOSIS — C61 MALIGNANT NEOPLASM OF PROSTATE: ICD-10-CM

## 2018-05-15 PROCEDURE — 88305 TISSUE EXAM BY PATHOLOGIST: CPT | Performed by: PATHOLOGY

## 2018-05-15 PROCEDURE — 55700 PR BIOPSY OF PROSTATE,NEEDLE/PUNCH: CPT | Mod: ,,, | Performed by: UROLOGY

## 2018-05-15 PROCEDURE — 88305 TISSUE EXAM BY PATHOLOGIST: CPT | Mod: 26,,, | Performed by: PATHOLOGY

## 2018-05-15 PROCEDURE — 76942 ECHO GUIDE FOR BIOPSY: CPT | Mod: 26,59,, | Performed by: UROLOGY

## 2018-05-15 PROCEDURE — 76872 US TRANSRECTAL: CPT | Mod: 26,,, | Performed by: UROLOGY

## 2018-05-15 PROCEDURE — 76872 US TRANSRECTAL: CPT | Performed by: UROLOGY

## 2018-05-15 PROCEDURE — 55700 HC PROSTATE NEEDLE BIOPSY: CPT | Performed by: UROLOGY

## 2018-05-15 RX ORDER — LIDOCAINE HYDROCHLORIDE 10 MG/ML
INJECTION, SOLUTION EPIDURAL; INFILTRATION; INTRACAUDAL; PERINEURAL
Status: DISCONTINUED
Start: 2018-05-15 | End: 2018-05-15 | Stop reason: HOSPADM

## 2018-05-15 RX ORDER — LIDOCAINE HYDROCHLORIDE 10 MG/ML
INJECTION INFILTRATION; PERINEURAL
Status: DISCONTINUED | OUTPATIENT
Start: 2018-05-15 | End: 2018-05-15 | Stop reason: HOSPADM

## 2018-05-15 RX ORDER — GENTAMICIN SULFATE 40 MG/ML
80 INJECTION, SOLUTION INTRAMUSCULAR; INTRAVENOUS ONCE
Status: COMPLETED | OUTPATIENT
Start: 2018-05-15 | End: 2018-05-15

## 2018-05-15 RX ADMIN — LIDOCAINE HYDROCHLORIDE 7 ML: 10 INJECTION INFILTRATION; PERINEURAL at 02:05

## 2018-05-15 RX ADMIN — GENTAMICIN SULFATE 80 MG: 40 INJECTION, SOLUTION INTRAMUSCULAR; INTRAVENOUS at 01:05

## 2018-05-15 NOTE — INTERVAL H&P NOTE
The patient has been examined and the H&P has been reviewed:    Cleared to hold bloodthinners which he did for 1 week  Proceed with biopsy  Pt is acceptable candidate for procedure at [facility]    Anesthesia/Surgery risks, benefits and alternative options discussed and understood by patient/family.          Active Hospital Problems    Diagnosis  POA    Malignant neoplasm of prostate [C61]  Yes      Resolved Hospital Problems    Diagnosis Date Resolved POA   No resolved problems to display.

## 2018-05-15 NOTE — DISCHARGE INSTRUCTIONS
Prostate Needle Biopsy    Prostate needle biopsy is a test to look for prostate cancer. During the test, a thin, hollow needle is used to take small samples of tissue from the prostate. The samples are then tested in a lab.  Getting ready for the procedure  Prepare as you have been told. In addition to the following:  · Tell your healthcare provider about all medicines you take. This includes herbs and other supplements. It also includes any blood thinners, such as warfarin, clopidogrel, or daily aspirin. You may need to stop taking some or all of them before the procedure.  · You may be told to use a laxative, enemas, or both before the biopsy. This is to empty the colon and rectum of stool. Follow the instructions you are given.  · Your healthcare provider may prescribe antibiotics before the procedure. If so, take these as directed.  Risks and possible complications   All procedures have risks. The risks of this procedure include:  · Discomfort in the prostate area  · Infection in the urinary tract or prostate  · Infection in the bloodstream  · Rectal or urinary bleeding   The day of the procedure  The procedure is done in a healthcare providers office or a hospital. It takes about 45 minutes. You will be able to go home the same day. Transrectal ultrasound is often used during the procedure. This test uses sound waves to make images on a computer screen. The images help the healthcare provider insert the needle in the correct place. During the biopsy:  · If ultrasound will be used, you may be asked to drink water to fill your bladder.    · You may lie on your side on an exam table.   · The ultrasound transducer, which is about the size of a finger, is lubricated. It is then inserted into the rectum. This will feel like a prostate exam. The transducer is moved until the prostate can be seen in the ultrasound images.    · To numb the biopsy area, local anesthetics may be injected. You might also be given  medicine to make you sleepy.  · Using the ultrasound images as a guide, the biopsy needle is inserted. It may be inserted through the rectum or through the skin between the scrotum and the anus (perineum).  · The needle is used to take tissue samples from the prostate. About 12 samples are taken from different areas of the prostate. These samples are sent to a lab to be tested for cancer.  After the biopsy  At first you may feel a little lightheaded, especially if you had medicine to make you sleepy. You can lie on the table until you feel able to stand. You can go home once you are feeling better. You can go back to your normal activities. You may have some blood in your urine or stool that day. This is normal. You may also notice blood in your semen for weeks after the biopsy. This is normal and not dangerous. Your healthcare provider can tell you more about what to expect.  Follow-up care  You will see your healthcare provider for a follow-up visit. Depending on the biopsy results, you may be scheduled for more tests. If signs of cancer are found, you and your healthcare provider can discuss options for further testing.  When to call your healthcare provider  Call your healthcare provider if you have any of the following:  · Blood clots in your urine  · Bloody diarrhea  · Blood in the urine or stool that doesnt go away after 48 hours  · Chest pain or trouble breathing (call 911)  · Chills  · Feeling of weakness  · Fever of 100.4°F (38°C) or higher, or as directed by your healthcare provider  · Inability to urinate   Date Last Reviewed: 5/1/2017 © 2000-2017 [employer]. [address] [place] [id number]. All rights reserved. This information is not intended as a substitute for professional medical care. Always follow your healthcare professional's instructions.      After your prostate biopsy    Avoid sexual activity,lifting, strenuous physical activity or exertion for 3 days     No riding  mowers, tractors, bicycles, motorcycles for 2-3 weeks    You may experience blood in your urine or stool for up to 2 weeks and in your semen for up to 6 weeks.  This is a normal side effect of the procedure and will resolve.    Drink plenty of water    Take antibiotics as prescribed    If you experience any of the following conditions, please return immediately to the clinic (during office hrs) or the Emergency Room if after hours:       Fever       Inabiltiy to urinate       Severe bleeding    You may resume aspirin, anti inflammatory and blood thinners in 3 days    Results take at minimum 10 business days.  A 2 week follow up will be scheduled to review pathology reports in the clinic    During office hours, please call  and ask to speak with the nurse if you have any questions.  If after hours, call the [facility] On [facility] # to be connectied t o the doctor on call

## 2018-05-15 NOTE — H&P (VIEW-ONLY)
[facility] Urology Progress Note    Nilton Sherwood is a 72 y.o. male who presents for prostate cancer follow up     He was originally referred to our practice by Dr Merrill when his psa was found to be 7.0, which was a rise from 4.2 in . He does have a history of prostate biopsy in  by  at [facility]. Trus bx results 14 were initially benign then amended to report ASAP in [place] and [facility].   Biopsy by Dr Rodriguez on 17 revealed 23.7g gland with nodule/mass seen on ultrasound at [facility] and targeted with extra biopsy cores, which were positive.  4 core positive isaiah 3+3=6, including at area of suspicion on ultrasound.   9% 2 cores LB, 30% 1 core L TZ, 26% 1 core [place]     Despite his low grade pathology, he was recommended to undergo treatment given his history of elevated psa and fairly exophytic lesion on ultrasound increasing risk for extraprostatic extension.   He saw Dr Garcia on 17 to discuss radiation therapy, and saw me 3/14/17 to discuss surgical management, specifically robotic prostatectomy, which he preferred to proceed with. AUA SS . Not sexually active.  He did however present to Dr Merrill's office on 3/10 with acute onset sudden L severe shoulder pain. In office EKG reportedly abnormal and different than previous EKGs on file and he was sent to [facility] for cardiac evaluation, where he was admitted for 3 days. He was found to have a new LBBB, and an EF= 35% with cardiomyopathy with new wall motion abnormalities.  +DM (A1c 8.8 ), HTN, smoker of 8 cigs/d, and strong family history of CAD (brother had MI age 40, mother  of CHF).    He had cardiac cath done 3/23/17 and reported at least 3-vessel disease (100% occlusion of proximal LAD, 100% occlusion of proximal RCA, and 100% occlusion of obtuse marginal and circumflex systems) /Only truly patent vessel noted was a large first marginal ramus which supplied the whole heart. On 17, he underwent  CABGx3 with reverse saphenous vein grafts and use of LIMA placed in situ to LAD. Intraoperative MINESH noted mildly reduced LV function preo and postop with severe inferior hypokinesis, normal pericardium, trivial valvular abnormalities.   Bartolome Campa - [place] - [facility] cardiology, Vick aHrtman - [place] - CT surg     As of 7/24/17, he had was asymptomatic and actively participating in cardiac rehab. ACE/ARB not added at time of surgery due to hypotension and he is taking metoprolol daily and coreg BID. + plavix 75mg daily.   Flomax had been controlling his urinary symptoms well and he needs refills.  As he had essentially been placed on active surveillance due to his cardiac status, rechecked psa that visit (7.5 on 7/24/17) and got 3T MRI given his adenocarcinoma positive prostate nodule to evaluate for any clinically significant lesions and for extraprostatic extension of disease. He was still interested in definitive local therapy, and prefers surgical management, though noted given his CM and poor EF he may not be a candidate.    3T MRI 8/23/17: 30mL prostate with no intravesical extension. No index lesion in peripheral zone. PIRAD2. Otherwise minor prostatitis like bands in PZ  TZ: focal lesion involving TZ at level of L lateral mid gland with decd T2 signal intensity with ill defined margins and abnormally decd ADC signal intensity with no hyperintensity on the DWI sequence or focal hyperemia. 0.1mL ([id number]). No extension into peripheral zone. PIRAD3. No SVI, No LAD     I last saw him in October 2017 noting his cardiac status continues to devolve, stating that his EF is down to 25% and cardiology had discussed implanting defibrillator. working on smoking cessation and down to about 3 cigarettes per day.  Notes that although his cardiac surgery was in May, he is just now starting to feel like he had cardiac surgery.  Discussed active surveillance again with 6 mos repeat psa and confirmatory re-biopsy, and if  "remained low grade low risk prostate cancer to continue surveillance     He returns today with psa of 6.3 on 4/20/18  Udip negative except for trace blood and 250 glucose  He did most recently see his cardiologist, Dr. Bartolome Campa, on 4/19/18.  He did have echo 2/27/18 revealing ejection fraction of 20-25% despite appropriate medical therapy and therefore had BiV AICD implantation on 3/21/18 with Dr. Gonzalez for ischemic cardiomyopathy and left bundle branch block.  Still having some swelling to the site  Had LANNY to pCirc and left main as well as PCI to mCirc, necessitating anticoagulation which is compliant with.  His urinary symptoms remain well controlled on Flomax.  No hematuria dysuria  No new back pain or bone pain      ROS: A comprehensive 10 system review was performed and is negative except as noted above in HPI    PHYSICAL EXAM:    Vitals:    04/27/18 1024   BP: 125/68   Pulse: 70   Resp: 18     Body mass index is 22.79 kg/m². Weight: 66 kg (145 lb 8.1 oz) Height: 5' 7" (170.2 cm)       General: Alert, cooperative, no distress, appears stated age   Head: Normocephalic, without obvious abnormality, atraumatic   Eyes: PERRL, conjunctiva/corneas clear   Lungs: Respirations unlabored   Heart: Warm and well perfused   Abdomen: Soft, nontender, nondistended  Extremities: Extremities normal, atraumatic, no cyanosis or edema   Skin: Skin color, texture, turgor normal, no rashes or lesions   Psych: Appropriate   Neurologic: Non-focal       Recent Results (from the past 336 hour(s))   PSA, Screening    Collection Time: 04/20/18  8:51 AM   Result Value Ref Range    PROSTATE SPECIFIC ANTIGEN, SCR - QUEST 6.3 (H) < OR = 4.0 ng/mL   POCT URINE DIPSTICK WITHOUT MICROSCOPE    Collection Time: 04/27/18 10:28 AM   Result Value Ref Range    Color, UA clear     Spec Grav UA 1.010     pH, UA 5.0     WBC, UA neg     Nitrite, UA neg     Protein trace     Glucose,      Ketones, UA neg     Urobilinogen, UA neg     Bilirubin neg "     Blood, UA neg        ASSESSMENT   1. Malignant neoplasm of prostate  POCT URINE DIPSTICK WITHOUT MICROSCOPE    Case Request Operating Room: TRANSRECTAL ULTRASOUND GUIDED PROSTATE BIOPSY    Place in Outpatient    Vital Signs     Notify physician        Plan    As it has now been 14 months since his original biopsy with low risk [place] 6 disease, and his cardiac status has not improved, with declining ejection fraction and recent need for AICD placement and chronic anticoagulation after PCI, he remains a very high risk surgical candidate.  Before further treatment discussion, revisiting the topic of radiotherapy, advised repeating biopsy as a confirmatory 1 year active surveillance biopsy, targeting areas of previous positivity, to get updated picture of his prostate cancer disease burden.  Encouraging that his PSA is stable from last check, however both of these areas represented increased from time of diagnosis.    I reviewed the details of a transrectal ultrasound-guided biopsy of the prostate, including use of local anesthesia.  Complications include bleeding, fever and chills. He was also instructed to watch for any signs of fever. If he does have any fever or chills after, he was advised to come to the emergency room right away for intravenous antibiotics and possible admission to the hospital. He is to refrain from any strenuous activity including sexual activity for the next 72 hours after biopsy. He was also advised that he may have blood while urinating, during bowel movements as well as during ejaculations.   He was given a prebiopsy/postbiopsy instruction sheet was reminding him to avoid aspirin and blood thinners for 7 days prior, take the Rxed antibiotics the day before, day of, day after biopsy, and perform a fleet enema at home morning of biopsy. All questions he had were answered.     TRUS[facility]biopsy scheduled at [facility] on 5/15/18   As the patient is chronically anticoagulated, it will be necessary to  hold Plavix a minimum of 5, but preferably 7 days prior to prostate biopsy, and 2-3 days afterwards.  If felt necessary by his cardiologist, he can remain on aspirin 81 mg daily.  Will need cardiac clearance to proceed with plan for blood thinners, and patient be contacted after review by Dr. Campa with clearance recommendations.

## 2018-05-16 VITALS
SYSTOLIC BLOOD PRESSURE: 127 MMHG | OXYGEN SATURATION: 98 % | TEMPERATURE: 98 F | HEIGHT: 67 IN | WEIGHT: 145.5 LBS | BODY MASS INDEX: 22.84 KG/M2 | DIASTOLIC BLOOD PRESSURE: 68 MMHG | HEART RATE: 68 BPM | RESPIRATION RATE: 20 BRPM

## 2018-05-16 NOTE — OP NOTE
Prostate Biopsy Procedure Note / Discharge Note:      DATE 5/15/18    SURGEON: Narinder Her MD    PRE-PROCEDURE DIAGNOSIS: Prostate Cancer      POST-PROCEDURE DIAGNOSIS: Same      INDICATION FOR PROCEDURE: Prostate cancer, on active surveillance largely due to cardiac factors, with isaiah 6 disease  Biopsy 1 year ago with G6 in 2/2 cores of LB nodule as well as L TZ and LA core. Also had ASAP in LA and LB  MRI 6 mos later PIRAD 2 PZ, PIRAD 3 TZ      ANESTHESIA: Local periprostatic block; 10 cc 1% lidocaine      PSA: 6.3      TRUS VOLUME: 28.3cc  (W 43.9, H 28.7 , L 43.1)      STAFF: Narinder Her M.D.      EBL: Minimal      SPECIMEN: 17 Prostate Biopsy Cores: right and left base, apex, and mid - medial and lateral of each (2 cores at LB lat and LA at), and left transition zone, as well as 2 cores of LB nodule      ULTRASOUND FINDINGS: left base nodule seen projecting from capsule toward bladder anterior to SV. Mild hypoechoic areas left tz/left apex      CONFIRMED PATIENT TOOK ANTIBIOTICS: Yes, cipro      CONFIRMED PATIENT NOT TAKING ASPIRIN OR ANTICOAGULANTS: Yes x1wk      CONFIRMED PATIENT USED ENEMA: Yes      PROCEDURE IN DETAIL:      Risk, Benefits, and alternatives explained to patient. All questions answered  to patients satisfaction and consented appropriately. Patient has completed the  prescribed bowel prep and antibiotic. TRUS probe inserted into rectum. Ultrasound measurements taken. Approximately 10cc of   1% lidocaine injected bilaterally in sandy-prostatic block fashion, as well as at the apex. 17 core biopies take as above with extra cores in areas of previous positivity including the discrete UF LB nodule  Patient tolerated well without complication.      CONDITION: Stable      DISPOSITION: Patient tolerated procedure well, and ambulated to recovery area assisted by nurse. He was instructed to watch for any signs of fever. If he does have any fever or chills of 101 degrees or more, he was  advised to come to the emergency room right away for intravenous antibiotics and possible admission to the hospital. He is to refrain from any strenuous activity including sexual activity for the next 72 hours after biopsy. He was also advised that he may have blood while urinating, during bowel movements as well as during ejaculations for up to 2 weeks. All his questions were answered. He will return to the office in 10-14 days for results      DISHARGE:  Status post uncomplicated outpatient procedure as above  Disposition: Home  No new meds - continue antibiotics as prescribed  Resume regular diet  FU 2 weeks for biopsy results  Return to ER if temp >101, uncontrollable urethral or rectal bleeding, or inability to urinate/urinary retention

## 2018-05-17 RX ORDER — LIDOCAINE HYDROCHLORIDE 10 MG/ML
1 INJECTION INFILTRATION; PERINEURAL ONCE
Status: DISCONTINUED | OUTPATIENT
Start: 2018-05-17 | End: 2020-10-19

## 2018-05-28 NOTE — PROGRESS NOTES
[facility] Urology Progress Note    Nilton Sherwood is a 72 y.o. male who presents for prostate cancer follow up     He was originally referred to our practice by Dr eMrrill when his psa was found to be 7.0, which was a rise from 4.2 in 2015. He does have a history of prostate biopsy in  by  at [facility]. Trus bx results 14 were initially benign then amended to report ASAP in LA and LB.   Biopsy by Dr Rodriguez on 17 revealed 23.7g gland with nodule/mass seen on ultrasound at LB and targeted with extra biopsy cores, which were positive.  4 core positive isaiah 3+3=6, including at area of suspicion on ultrasound.   9% 2 cores LB, 30% 1 core L TZ, 26% 1 core LA  Despite his low grade pathology, he was recommended to undergo treatment given his history of elevated psa and fairly exophytic lesion on ultrasound increasing risk for extraprostatic extension.   He saw Dr Garcia on 17 to discuss radiation therapy, and saw me 3/14/17 to discuss surgical management, specifically robotic prostatectomy, which he preferred to proceed with. AUA SS . Not sexually active.    He did however present to Dr Merrill's office on 3/10/17 with acute onset sudden L severe shoulder pain. In office EKG reportedly abnormal and different than previous EKGs on file and he was sent to [facility] for cardiac evaluation, where he was admitted for 3 days. He was found to have a new LBBB, and an EF= 35% with cardiomyopathy with new wall motion abnormalities.  +DM (A1c 8.8 ), HTN, smoker of 8 cigs/d, and strong family history of CAD (brother had MI age 40, mother  of CHF).   He had cardiac cath done 3/23/17 and reported at least 3-vessel disease (100% occlusion of proximal LAD, 100% occlusion of proximal RCA, and 100% occlusion of obtuse marginal and circumflex systems) /Only truly patent vessel noted was a large first marginal ramus which supplied the whole heart. On 17, he underwent  CABGx3 with reverse saphenous vein grafts and use of LIMA placed in situ to LAD. Intraoperative MINESH noted mildly reduced LV function preo and postop with severe inferior hypokinesis, normal pericardium, trivial valvular abnormalities.   Bartolome Campa - [place] [facility] cardiology, Vick Hartman - [place] - CT surg     As of 7/24/17, he had was asymptomatic and actively participating in cardiac rehab. + plavix 75mg daily.   Flomax had been controlling his urinary symptoms well and he needs refills.  As he had essentially been placed on active surveillance due to his cardiac status, rechecked psa that visit (7.5 on 7/24/17) and got 3T MRI given his adenocarcinoma positive prostate nodule.   3T MRI 8/23/17: 30mL prostate with no intravesical extension. No index lesion in peripheral zone. PIRAD2. Otherwise minor prostatitis like bands in PZ  TZ: focal lesion involving TZ at level of L lateral mid gland with decd T2 signal intensity with ill defined margins and abnormally decd ADC signal intensity with no hyperintensity on the DWI sequence or focal hyperemia. 0.1mL ([id number]). No extension into peripheral zone. PIRAD3. No SVI, No LAD  As of 10/17 his EF was down to 25% and cardiology had discussed implanting defibrillator.   He did have echo 2/27/18 revealing ejection fraction of 20-25% despite appropriate medical therapy and therefore had BiV AICD implantation on 3/21/18 with Dr. Gonzalez for ischemic cardiomyopathy and left bundle branch block.  Had LANNY to pCirc and left main as well as PCI to mCirc, necessitating anticoagulation which is compliant with     4/20/18 - psa 6.3. LUTS well controlled on flomax. No new back or bone pain.  1 year confirmatory AS prostatw biopsy on  5/15/18  SPECIMEN: 17 Prostate Biopsy Cores: right and left base, apex, and mid - medial and lateral of each (2 cores at LB lat and LA at), and left transition zone, as well as 2 cores of LB nodule  ULTRASOUND FINDINGS: left base nodule seen projecting  "from capsule toward bladder anterior to SV. Mild hypoechoic areas left tz/left apex  VOLUME: 28.3cc  PATH:  LEFT BASE LATERAL: 3+3=6. 5%, 2/2 cores, +PNI  LEFT BASE MEDIAL: 3+3=6. 7%   LEFT MID LATERAL: 3+3=6. 5%   LEFT MID MEDIAL: 3+3=6. 5%   LEFT APEX LATERAL: 3+3=6. 5% 1/2 cores   LEFT TRANSITION ZONE: 3+3=6. 25%   * LB nodule cores benign    He did well after biopsy without pain, fever, chills. Hematuria cleared quickly.  He is having his pacemaker interrogated next week  Udip 1000 glucose, 50 blood  Has had increased intake of carbs/potatoes    ROS: A comprehensive 10 system review was performed and is negative except as noted above in HPI    PHYSICAL EXAM:    Vitals:    05/29/18 1352   BP: 121/72   Pulse: 69   Temp: 98 °F (36.7 °C)     Body mass index is 22.71 kg/m². Weight: 65.8 kg (145 lb) Height: 5' 7" (170.2 cm)       General: Alert, cooperative, no distress, appears stated age   Head: Normocephalic, without obvious abnormality, atraumatic   Eyes: PERRL, conjunctiva/corneas clear   Lungs: Respirations unlabored   Heart: Warm and well perfused   Abdomen: soft NT ND  Extremities: Extremities normal, atraumatic, no cyanosis or edema   Skin: Skin color, texture, turgor normal, no rashes or lesions   Psych: Appropriate   Neurologic: Non-focal       Recent Results (from the past 336 hour(s))   POCT urine dipstick without microscope    Collection Time: 05/29/18  3:52 PM   Result Value Ref Range    Color, UA yellow/ clear     Spec Grav UA 1,005     pH, UA 6     WBC, UA negative     Nitrite, UA negative     Protein negative     Glucose, UA 1,000     Ketones, UA negative     Urobilinogen, UA negative     Bilirubin negative     Blood, UA 50        ASSESSMENT   1. Malignant neoplasm of prostate  POCT urine dipstick without microscope    Prostate Specific Antigen, Diagnostic       Plan    On active surveillance 2/2 cardiac issues with significant cardiomyopathy and CAD, declining EF (~25%) despite revascularization and " cardiac rehab, and recent AICD placement.  His one year confirmatory AS prostate biopsy demonstrated continued isaiah 3+3=6 disease in 7/17 cores, low volumes of each core. Biopsy cores from US visualized nodule were benign.  As he previously had 4/13 cores positive isaiah 6 disease, his prostate cancer is essentially quite stable and overall low risk.  From initial diagnosis, as per [facility] prostatectomy nomogram, would have had 93/87% 5/10 year PFS with 41% risk JANES and 1% risk LN/SV involvement.   Now with 7/17 cores, risk profile almost identical with marginal differences: 91/84% PFS at 5/10 years, 49% risk JANES, 2% risk LN/SV involvment.  Nomogram still illustrates low risk disease. As well, MRI demonstrates PIRAD3 in transition zone, though is PIRAD 2 peripherally    Again had extensive discussion about treatment vs active surveillance. Remains very high risk for prostatectomy given cardiac status, anticoagulation, poorly controlled DM. Discussed decreasing carbs/potatoes/sugars. Pt not interested in radiotherapy at this time. As well, at age 72 with significant CAD/CM and EF~25% there is low likelihood he will see clinical effects of his prostate cancer in his lifetime. As previous concerns for treatment despite G6 disease (rising psa and biopsy positive nodule) are not present as psa stable and nodule biopsy negative, and with interim significant changes to his cardiac history since time of dx, patient has elected to continue active surveillance, which I agree with.  Will continue to monitor PSA Q6 mos. As biopsy stable 1 year later, could consider MRI as next eval as comparison, and if psa remains stable can increase interval timeline.  All questions patient had were answered, and he is agreeable to treatment plan    RTC 6 mos with psa prior

## 2018-05-29 ENCOUNTER — OFFICE VISIT (OUTPATIENT)
Dept: UROLOGY | Facility: CLINIC | Age: 73
End: 2018-05-29
Payer: COMMERCIAL

## 2018-05-29 VITALS
BODY MASS INDEX: 22.76 KG/M2 | WEIGHT: 145 LBS | DIASTOLIC BLOOD PRESSURE: 72 MMHG | TEMPERATURE: 98 F | HEART RATE: 69 BPM | SYSTOLIC BLOOD PRESSURE: 121 MMHG | HEIGHT: 67 IN

## 2018-05-29 DIAGNOSIS — C61 MALIGNANT NEOPLASM OF PROSTATE: Primary | ICD-10-CM

## 2018-05-29 LAB
BILIRUB SERPL-MCNC: NEGATIVE MG/DL
BLOOD URINE, POC: 50
COLOR, POC UA: ABNORMAL
GLUCOSE UR QL STRIP: 1000
KETONES UR QL STRIP: NEGATIVE
LEUKOCYTE ESTERASE URINE, POC: NEGATIVE
NITRITE, POC UA: NEGATIVE
PH, POC UA: 6
PROTEIN, POC: NEGATIVE
SPECIFIC GRAVITY, POC UA: 1005
UROBILINOGEN, POC UA: NEGATIVE

## 2018-05-29 PROCEDURE — 99999 PR PBB SHADOW E&M-EST. PATIENT-LVL III: CPT | Mod: PBBFAC,,, | Performed by: UROLOGY

## 2018-05-29 PROCEDURE — 99214 OFFICE O/P EST MOD 30 MIN: CPT | Mod: 25,S$GLB,, | Performed by: UROLOGY

## 2018-05-29 PROCEDURE — 81002 URINALYSIS NONAUTO W/O SCOPE: CPT | Mod: S$GLB,,, | Performed by: UROLOGY

## 2018-12-17 ENCOUNTER — OFFICE VISIT (OUTPATIENT)
Dept: UROLOGY | Facility: CLINIC | Age: 73
End: 2018-12-17
Payer: COMMERCIAL

## 2018-12-17 VITALS
HEART RATE: 66 BPM | DIASTOLIC BLOOD PRESSURE: 65 MMHG | SYSTOLIC BLOOD PRESSURE: 109 MMHG | RESPIRATION RATE: 18 BRPM | BODY MASS INDEX: 23.18 KG/M2 | TEMPERATURE: 98 F | HEIGHT: 67 IN | WEIGHT: 147.69 LBS

## 2018-12-17 DIAGNOSIS — C61 PROSTATE CANCER: Primary | ICD-10-CM

## 2018-12-17 LAB
BILIRUB SERPL-MCNC: NORMAL MG/DL
BLOOD URINE, POC: NORMAL
COLOR, POC UA: YELLOW
GLUCOSE UR QL STRIP: NORMAL
KETONES UR QL STRIP: NORMAL
LEUKOCYTE ESTERASE URINE, POC: NORMAL
NITRITE, POC UA: NORMAL
PH, POC UA: 6
PROTEIN, POC: NORMAL
SPECIFIC GRAVITY, POC UA: 1.01
UROBILINOGEN, POC UA: NORMAL

## 2018-12-17 PROCEDURE — 99215 OFFICE O/P EST HI 40 MIN: CPT | Mod: 25,S$GLB,, | Performed by: UROLOGY

## 2018-12-17 PROCEDURE — 81002 URINALYSIS NONAUTO W/O SCOPE: CPT | Mod: S$GLB,,, | Performed by: UROLOGY

## 2018-12-17 PROCEDURE — 99999 PR PBB SHADOW E&M-EST. PATIENT-LVL III: CPT | Mod: PBBFAC,,, | Performed by: UROLOGY

## 2018-12-17 RX ORDER — DOXAZOSIN 1 MG/1
1 TABLET ORAL NIGHTLY
COMMUNITY
End: 2021-07-01

## 2018-12-17 NOTE — PROGRESS NOTES
[facility] Urology Progress Note    Nilton Sherwood is a 73 y.o. male who presents for prostate cancer follow up     He was originally referred to our practice by Dr Merrill when his psa was found to be 7.0, which was a rise from 4.2 in . He does have a history of prostate biopsy in  by  at   [facility]. Trus bx results 14 were initially benign then amended to report ASAP in LA and LB.   Biopsy by Dr Rodriguez on 17 revealed 23.7g gland with nodule/mass seen on ultrasound at LB and targeted with extra biopsy cores, which were positive.  4 core positive isaiah 3+3=6, including at area of suspicion on ultrasound. 9% 2 cores LB, 30% 1 core L TZ, 26% 1 core LA  Despite his low grade pathology, he was recommended to undergo treatment given his history of elevated psa and fairly exophytic lesion on ultrasound increasing risk for extraprostatic extension.   He saw Dr Garcia on 17 to discuss radiation therapy, and saw me 3/14/17 to discuss surgical management, specifically robotic prostatectomy, which he preferred to proceed with.   AUA SS . Not sexually active.     He did however present to Dr Merrill's office on 3/10/17 with acute onset sudden L severe shoulder pain. In office EKG reportedly abnormal and different than previous EKGs on file and he was sent to [facility] for cardiac evaluation, where he was admitted for 3 days. He was found to have a new LBBB, and an EF= 35% with cardiomyopathy with new wall motion abnormalities.  +DM (A1c 8.8 ), HTN, smoker of 8 cigs/d, and strong family history of CAD (brother had MI age 40, mother  of CHF).   He had cardiac cath done 3/23/17 and reported at least 3-vessel disease (100% occlusion of proximal LAD, 100% occlusion of proximal RCA, and 100% occlusion of obtuse marginal and circumflex systems) /Only truly patent vessel noted was a large first marginal ramus which supplied the whole heart. On 17, he underwent  CABGx3 with reverse saphenous vein grafts and use of LIMA placed in situ to LAD. Intraoperative MINESH noted mildly reduced LV function preo and postop with severe inferior hypokinesis, normal pericardium, trivial valvular abnormalities.   Bartolome Campa - [place] - [facility] cardiology, Vick Hartman - [place] - CT surg     7/24/17, he had was asymptomatic and actively participating in cardiac rehab. + plavix 75mg daily. LUTS controlled on flomax. PSA 7.5 7/24/17. 3T MRI only PIRAD2 peripheral zone but had PIRAD 3 focal lesion involving TZ al level of L lateral mid gland. 0.1mL ([id number]). No extension to PZ. Continued AS  10/17 his EF was down to 25% and cardiology had discussed implanting defibrillator. Echo 2/27/18 revealed EF 20-25% despite appropriate medical therapy and therefore had BiV AICD implantation on 3/21/18 with Dr. Gonzalez for ischemic cardiomyopathy and left bundle branch block.  Had LANNY to pCirc and left main as well as PCI to mCirc, necessitating anticoagulation  4/20/18 - psa 6.3. LUTS well controlled on flomax. No new back or bone pain.  5/15/18 - 1 year confirmatory AS prostate biopsy - 7/17 cores G6. 28.3cc gland  PATH: LB lat 5% 2/2 +PNI, LB medial 7%, LM lat 5%, LM medial 5%, LA lat 5%, LTZ 25%  * LB nodule cores benign     He returns today noting  He had recent workup by primary care for headaches/dizziness with negative carotid US and has seen neuro who did negative til test  He had a repeat psa 12/3/18 of 10.5 with very low free psa of 0.8   UA negative  AUA SS 5/2 mostly satisfied  - 2 sleeping, 1: freq/int/weak stream  12/6/18 changed flomax to doxazosin 1 mg by dr campa 2/2 dizziness. Sxs controlled as above  No new back pain or bone pain  No hematuria or dysuria    ROS: A comprehensive 10 system review was performed and is negative except as noted above in HPI    PHYSICAL EXAM:    Vitals:    12/17/18 1317   BP: 109/65   Pulse: 66   Resp: 18   Temp: 98 °F (36.7 °C)     Body mass index is 23.13  "kg/m². Weight: 67 kg (147 lb 11.3 oz) Height: 5' 7" (170.2 cm)       General: Alert, cooperative, no distress, appears stated age   Head: Normocephalic, without obvious abnormality, atraumatic   Eyes: PERRL, conjunctiva/corneas clear   Lungs: Respirations unlabored   Heart: Warm and well perfused   Abdomen: soft NT ND  Extremities: Extremities normal, atraumatic, no cyanosis or edema   Skin: Skin color, texture, turgor normal, no rashes or lesions   Psych: Appropriate   Neurologic: Non-focal       Recent Results (from the past 336 hour(s))   POCT URINE DIPSTICK WITHOUT MICROSCOPE    Collection Time: 12/17/18  1:24 PM   Result Value Ref Range    Color, UA yellow     Spec Grav UA 1.015     pH, UA 6     WBC, UA neg     Nitrite, UA neg     Protein neg     Glucose, UA neg     Ketones, UA neg     Urobilinogen, UA neg     Bilirubin neg     Blood, UA neg        ASSESSMENT   1. Prostate cancer  POCT URINE DIPSTICK WITHOUT MICROSCOPE       Plan    Patient has been doing well on active surveillance, which he essentially chose by default given his significant cardiac history.  His restaging biopsy earlier this year confirmed all isaiah 6 disease in low volume each of a moderate number or cores  Initially averse to radiotherapy as well given social/travel constraints. No retired and has transportation    PSA has had interval rise, now >10, and advised to consider treatment, and since poor surgical candidate with his CAD/CHF, discuss radiotherapy.  Discussed XRT vs XRT+ADT. Given isaiah 6 pathology, may be reasonable to do without ADT. As well, the ADT may pose a cardiac risk given his poor cardiovascular status and low EF.  Will discuss need for ADT with rad/onc and risk with cardiology.    As well, will need cardiology to clear to hold asa/plavix for marker placement, though he was able to do so for confirmatory biopsy.   We discussed options for marker placement such as transrectal with local, or transperineal with " concurrent placement of spaceoar hydrogel to decrease complications of radiation and increase quality of life, done in OR with sedation/MAC-local vs general LMA. He prefers the latter and after risk benefit discussion, appropriate informed consent was obtained for transperineal fiducial marker placement and placement of sandy prostatic spacing Hydrogel in the OR.  Will discuss with cardiology not only the ability to hold bloodthinners for the procedure, but also undergo brief mac/local vs general lma for spaceoar+markers.  If not cleared for OR can place markers alone transrectally with local.    Tentative date of 2/7/19 for markers+spaceOAR pending above clearance/plan.  40 mins spent in encounter, over half in counseling

## 2018-12-17 NOTE — Clinical Note
Pt saw you previously, elected surgery, but prior had cabg despite which has refractory chf. Have been surveilling but psa rising >10 now.Nodule seen on US initially biopsy positive with [place], but re-bx those areas negative and still has G6 disease, though many cores +.Wants to do radiation now - coming in to discuss with you on 1/10Please comment on need for ADT. I tend to always default to it, though I know in G6 sometimes isnt necessary, and I worry about his cardiac risks with it. CCed his cardiologist too

## 2019-01-10 ENCOUNTER — DOCUMENTATION ONLY (OUTPATIENT)
Dept: RADIATION ONCOLOGY | Facility: CLINIC | Age: 74
End: 2019-01-10

## 2019-01-10 ENCOUNTER — OFFICE VISIT (OUTPATIENT)
Dept: RADIATION ONCOLOGY | Facility: CLINIC | Age: 74
End: 2019-01-10
Payer: MEDICARE

## 2019-01-10 VITALS
TEMPERATURE: 98 F | SYSTOLIC BLOOD PRESSURE: 124 MMHG | WEIGHT: 149.13 LBS | BODY MASS INDEX: 23.35 KG/M2 | DIASTOLIC BLOOD PRESSURE: 53 MMHG | HEART RATE: 65 BPM

## 2019-01-10 DIAGNOSIS — C61 PROSTATE CANCER: Primary | ICD-10-CM

## 2019-01-10 DIAGNOSIS — R58 BLEEDING: ICD-10-CM

## 2019-01-10 PROCEDURE — 99215 OFFICE O/P EST HI 40 MIN: CPT | Mod: ,,, | Performed by: RADIOLOGY

## 2019-01-10 PROCEDURE — 1101F PT FALLS ASSESS-DOCD LE1/YR: CPT | Mod: ,,, | Performed by: RADIOLOGY

## 2019-01-10 PROCEDURE — 99215 PR OFFICE/OUTPT VISIT, EST, LEVL V, 40-54 MIN: ICD-10-PCS | Mod: ,,, | Performed by: RADIOLOGY

## 2019-01-10 PROCEDURE — 1101F PR PT FALLS ASSESS DOC 0-1 FALLS W/OUT INJ PAST YR: ICD-10-PCS | Mod: ,,, | Performed by: RADIOLOGY

## 2019-01-10 NOTE — PROGRESS NOTES
Nilton Sherwood  [id number]  1945  1/10/2019  Narinder Her Md  [address] [place] [address] [address]  [place] [address]    REASON FOR CONSULTATION: Low-intermediate (favorable) risk prostate adenocarcinoma, [id number] GS 3+3 (7/16 cores; 5-25%), iPSA 10.5  TREATMENT GOAL: primary    HISTORY OF PRESENT ILLNESS:   73-year-old gentleman who originally saw me in February 2017 following a PSA level of 7 with transrectal ultrasound and biopsy revealing 4/17 cores positive for Shazia 3+3 disease, 9-30 percent of the cores with disease on the left side were nodules appreciated by ultrasound. Given his low risk designation and desire for therapy I recommended RP with Dr. Her. However he subsequently was appreciated to have significant coronary artery disease and proceeded with 3 vessel CABG in May 2017.    He re-presented to Dr. Penny office with PSA of 6.3 with repeat transrectal ultrasound and biopsy revealing again left-sided Shazia 3+3 disease in 5-25% of 7/16 cores with targeted biopsy of nodule negative. Updated PSA from December this year is 10.5. He discussed definitive therapy again with Dr. Her who recommended definitive radiotherapy given operative risk secondary to poor ejection fraction and recent cardiac events. +/- ADT    Patient presents to discuss radiotherapy.    Today patient denies fever, chills, chest pain, shortness of breath, cough, hemoptysis. He denies appetite, energy changes. He denies bone pain. He denies dysuria, hematuria, bright red blood per rectum. He has mild frequency and nocturia times 1-2.    AUA 9  QOL 2  IIEF (not answered)    Review of Systems   Constitutional: Negative for appetite change, chills, fever and unexpected weight change.   HENT:   Negative for lump/mass, mouth sores, sore throat, trouble swallowing and voice change.    Eyes: Negative for eye problems and icterus.   Respiratory: Negative for chest tightness, cough, hemoptysis and shortness of breath.     Cardiovascular: Negative for chest pain and leg swelling.   Gastrointestinal: Negative for abdominal distention, abdominal pain, blood in stool, constipation, diarrhea, nausea and vomiting.   Genitourinary: Positive for nocturia. Negative for bladder incontinence, difficulty urinating, dysuria, frequency and hematuria.    Musculoskeletal: Negative for back pain, gait problem, neck pain and neck stiffness.   Neurological: Negative for extremity weakness, gait problem, headaches, numbness and seizures.   Hematological: Negative for adenopathy.     Past Medical History:   Diagnosis Date    Anemia 7/31/2017    B12    Cancer Feb 2017    Prostate    Cardiomyopathy     Cataract     Colon polyp     Coronary artery disease involving native coronary artery of native heart with angina pectoris 5/18/2017    Diabetes mellitus, type 2     Elevated glucose     Elevated PSA     Hyperlipidemia     Presence of stent in coronary artery 11/15/2017    Prostate cancer     Substance abuse     Tobacco abuse      Past Surgical History:   Procedure Laterality Date    CARDIAC CATHETERIZATION  11/15/2017    LANNY to pCirc and Left main    CARDIAC DEFIBRILLATOR PLACEMENT Left 03/21/2018    CATARACT EXTRACTION Right     COLONOSCOPY      6 years ago    CORONARY ARTERY BYPASS GRAFT  05/02/2017    CABG x 3    EYE SURGERY      right cataract removal    PROSTATE BIOPSY  Jan 2014    Negative    PROSTATE SURGERY      Biopsy    TRANSRECTAL ULTRASOUND GUIDED PROSTATE BIOPSY N/A 5/15/2018    [facility] at [facility]    TRANSRECTAL ULTRASOUND GUIDED PROSTATE BIOPSY N/A 2/6/2017    Performed by Aleta Rodriguez MD at [facility]     Social History     Socioeconomic History    Marital status: Single     Spouse name: None    Number of children: None    Years of education: None    Highest education level: None   Social Needs    Financial resource strain: None    Food insecurity - worry: None    Food  insecurity - inability: None    Transportation needs - medical: None    Transportation needs - non-medical: None   Occupational History    Occupation: Retired   Tobacco Use    Smoking status: Light Tobacco Smoker     Packs/day: 0.25     Years: 20.00     Pack years: 5.00     Types: Cigarettes    Smokeless tobacco: Never Used   Substance and Sexual Activity    Alcohol use: No    Drug use: No    Sexual activity: Not Currently   Other Topics Concern    None   Social History Narrative    Denies social or economic concerns      Family History   Problem Relation Age of Onset    Heart disease Mother     Heart failure Mother     Heart disease Father     Heart disease Brother     Cancer Sister     Lung cancer Sister     Alzheimer's disease Maternal Aunt        PRIOR HISTORY OF CHEMOTHERAPY OR RADIOTHERAPY: Please see HPI for patients prior oncologic history.       Medication List           Accurate as of 1/10/19 12:12 PM. If you have any questions, ask your nurse or doctor.               CONTINUE taking these medications    aspirin 81 MG EC tablet  Commonly known as:  ECOTRIN     atorvastatin 40 MG tablet  Commonly known as:  LIPITOR     carvedilol 6.25 MG tablet  Commonly known as:  COREG  Take 1 tablet (6.25 mg total) by mouth 2 (two) times daily.     clopidogrel 75 mg tablet  Commonly known as:  PLAVIX     cyanocobalamin (vitamin B-12) 1,000 mcg Tbsr  Take 1,000 mcg by mouth once daily.     doxazosin 1 MG tablet  Commonly known as:  CARDURA     lisinopril 2.5 MG tablet  Commonly known as:  PRINIVIL,ZESTRIL  Take 1 tablet (2.5 mg total) by mouth once daily.     metFORMIN 1000 MG tablet  Commonly known as:  GLUCOPHAGE  Take 1 tablet (1,000 mg total) by mouth 2 (two) times daily with meals.     multivitamin capsule     tamsulosin 0.4 mg Cap  Commonly known as:  FLOMAX  TAKE 1 CAPSULE BY MOUTH EVERY DAY     VITAMIN D3 ORAL          Review of patient's allergies indicates:  No Known Allergies    QUALITY OF  LIFE: 90%- Able to Carry on Normal Activity: Minor Symptoms of Disease    Vitals:    01/10/19 0959 01/10/19 1007   BP: (!) 124/53    Pulse: 65    Temp: 97.8 °F (36.6 °C)    TempSrc: Oral    SpO2:  (P) 98%   Weight: 67.6 kg (149 lb 1.6 oz)    PainSc: 0-No pain      Body mass index is 23.35 kg/m².    PHYSICAL EXAM:   GENERAL: alert; in no apparent distress.   HEAD: normocephalic, atraumatic.  EYES: pupils are equal, round, reactive to light and accommodation. Sclera anicteric. Conjunctiva not injected.   NOSE/THROAT: no nasal erythema or rhinorrhea. Oropharynx pink, without erythema, ulcerations or thrush. Poor dentition  NECK: no cervical motion rigidity; supple with no masses.  CHEST: Patient is speaking comfortably on room air with normal work of breathing without using accessory muscles of respiration.  CARDIOVASCULAR: regular rate and rhythm; no murmurs, rubs or gallops.  ABDOMEN: soft, nontender, nondistended. Bowel sounds present.   MUSCULOSKELETAL: no tenderness to palpation along the spine or scapulae. Normal range of motion.  NEUROLOGIC: cranial nerves II-XII intact bilaterally. Strength 5/5 in bilateral upper and lower extremities. No sensory deficits appreciated. Normal gait.  LYMPHATIC: no cervical, supraclavicular or axillary adenopathy appreciated bilaterally.   EXTREMITIES: + clubbing; no cyanosis, edema.  SKIN: no erythema, rashes, ulcerations noted.   ERNESTO: deferred    REVIEW OF IMAGING/PATHOLOGY/LABS: Please see HPI. All images reviewed personally by dictating physician.   No results found.    ASSESSMENT: 73 y.o. male with low-intermediate (favorable) risk prostate adenocarcinoma, [id number] GS 3+3 (7/16 cores; 5-25%), iPSA 10.5; nonoperable candidate.  PLAN:  Nilton Sherwood originally presented with low risk prostate adenocarcinoma and given his age and desire for therapy, I recommended consideration a prostatectomy. He was scheduled to proceed with this when he developed symptomatic coronary  artery disease requiring CABG x 3. After recovery, he presented to Dr. Her with updated PSA now greater than 10 prompting transrectal ultrasound and biopsy that again revealed Challenge 6 disease, low volume in 7/16 cores. To further clarify his now intermediate risk designation I employed the [facility] nomogram with results as below:  15yr PCSS 99%  10yr bPFS 77%  OC  45%  EPE  55%  LN+  3%  SVI  2%    The prognostic model bears out his favorable intermediate risk status. I agree with his desire for therapy and do feel his life expectancy is greater than 10 years. Per Dr. Her, patient is considered a poor surgical candidate. We previously discussed his case and the utility of hormone deprivation therapy. Given his favorable intermediate risk designation and pre-existing coronary artery disease, I would recommend against ADT. I do recommend definitive radiotherapy alone, 7740 cGy in 43 fractions using IMRT and daily image guidance which requires placement of gold fiducials and I understand he will be undergoing Space[employer] placement as well, to minimize dosing to the adjacent rectum. I will carbon copy Dr. Her to make arrangements. Today I diagrammed for the patient the utility of bladder and bowel preparation in terms of sparing dose to those structures and he expressed understanding. He asked several informed questions which I did my best to address and he would like to proceed with therapy.    I carefully explained the process of CT simulation and treatment delivery with weekly physician visits.     We discussed the risks and benefits of the above treatment and have gone over in detail the acute and late toxicities of radiation therapy to the prostate/prox SV. The patient expressed  understanding and has signed a consent form which is included in the patients chart.     The patient has our contact information and understands that they are free to contact us at any time with questions or  concerns regarding radiation therapy.    DISPOSITION: RTC FOR CT SIM 1 week after fiducials placed    I have personally seen and evaluated this patient. Greater than 50% of this time was spent discussing coordination of care and/or counseling.    PHYSICIAN: Slick Garcia Jr, MD

## 2019-01-10 NOTE — PROGRESS NOTES
Nilton Sherwood  [id number]  1945  1/10/2019  No referring provider defined for this encounter.    DIAGNOSIS: Cancer Staging  Prostate cancer  Staging form: Prostate, AJCC 8th Edition  - Clinical: Stage IIA (cT2a, cN0, cM0, PSA: 10.5, Grade Group: 1) - Signed by Slick Garcia Jr., MD on 1/10/2019      TREATMENT SITE(S): prostate/prox SV    INTENT: CURATIVE    TREATMENT SETTING: RT ALONE     MODALITY: PHOTON    TECHNIQUE:  INTENSITY MODULATED RADIOTHERAPY (IMRT)    IMRT MEDICAL NECESSITY: Target volume irregular shape/proximate to critical structures, Narrow margins needed to protect adjacent structures, High precision necessary, Conventional planning maneuvers insufficient and Concave target volume with critical tissues in concavity    I have personally performed treatment planning for the patient, reviewing relevant history/physical and imaging. I have defined GTV, CTV, PTV and organs at risk.     In order to accomplish this plan, I am ordering:  SIMULATION: CT SIMULATION FOR PLACEMENT OF TREATMENT FIELDS    CONTRAST: IV    TO ACCOMPLISH REPRODUCIBLE POSITION: VACLOC and FULL BLADDER    DEVICES FOR BEAM SHAPING: CUSTOMIZED MLC    CUSTOMIZED BOLUS: none    IMAGING: DAILY KV/KV OBI and CBCT WEEKLY    I have ordered a weekly physics check.    SPECIAL PHYSICS CONSULT: NO  REASON: N/A    SPECIAL TREATMENT CIRCUMSTANCE: NO  Concurrent or recent administration of chemotherapeutic agents which are known potent radiosensitizers and thus will require vigilant monitoring for exaggerated radiation toxicities.    LABS: PSA LAST WEEK OF RT    ANTICIPATED PRESCRIPTION TO ISOCENTER: 7020cGy/39frx to prostate/prox SV + 720cGy/4frx to prostate (total 7740cGy/43frx)    ENERGY: 6X    TREATMENT: DAILY    PHYSICIAN: Slick Garcia Jr, MD

## 2019-01-29 ENCOUNTER — HOSPITAL ENCOUNTER (OUTPATIENT)
Dept: PREADMISSION TESTING | Facility: HOSPITAL | Age: 74
Discharge: HOME | End: 2019-01-29
Attending: UROLOGY
Payer: MEDICARE

## 2019-01-29 VITALS — WEIGHT: 149 LBS | HEIGHT: 67 IN | BODY MASS INDEX: 23.39 KG/M2

## 2019-01-29 DIAGNOSIS — C61 PROSTATE CANCER: ICD-10-CM

## 2019-01-29 PROCEDURE — 99900103 DSU ONLY-NO CHARGE-INITIAL HR (STAT)

## 2019-01-29 PROCEDURE — 99900104 DSU ONLY-NO CHARGE-EA ADD'L HR (STAT)

## 2019-01-29 NOTE — DISCHARGE INSTRUCTIONS
To confirm, Your doctor has instructed you that surgery is scheduled for:     Please report to [facility], Registration the morning of surgery. You must check-in and receive a wristband before going to your procedure.    Pre-Op will call the afternoon prior to surgery between 1:00 and 6:00 PM with the final arrival time.  Phone number: [phone]    PLEASE NOTE:  The surgery schedule has many variables which may affect the time of your surgery case.  Family members should be available if your surgery time changes.  Plan to be here the day of your procedure between 4-6 hours.    MEDICATIONS:  TAKE ONLY THESE MEDICATIONS WITH A SMALL SIP OF WATER THE MORNING OF YOUR PROCEDURE:    -0-  DO NOT TAKE THESE MEDICATIONS 5-7 DAYS PRIOR to your procedure or per your surgeon's request: ASPIRIN, ALEVE, ADVIL, IBUPROFEN, FISH OIL VITAMIN E, HERBALS  (May take Tylenol)    Cardiologist told pt to stay on aspirin.       Last Dose PLAVIX - 2/1/19         No Metformin PM or Am before surgery.    ONLY if you are prescribed any types of blood thinners such as:  Aspirin, Coumadin, Plavix, Pradaxa, Xarelto, Aggrenox, Effient, Eliquis, Savasya, Brilinta, or any other, ask your surgeon whether you should stop taking them and how long before surgery you should stop.  You may also need to verify with the prescribing physician if it is ok to stop your medication.      INSTRUCTIONS IMPORTANT!!  · Do not eat or drink anything between midnight and the time of your procedure- this includes gum, mints, and candy.  · Do not smoke or drink alcoholic beverages 24 hours prior to your procedure.  · Shower the night before AND the morning of your procedure with a Chlorhexidine wash such as Hibiclens or Dial antibacterial soap from the neck down.  Do not get it on your face or in your eyes.  You may use your own shampoo and face wash. This helps your skin to be as bacteria free as possible.    · If you wear contact lenses,  dentures, hearing aids or glasses, bring a container to put them in during surgery and give to a family member for safe keeping.  Please leave all jewelry, piercing's and valuables at home.   · DO NOT remove hair from the surgery site.  Do not shave the incision site unless you are given specific instructions to do so.    · ONLY if you have been diagnosed with sleep apnea please bring your C-PAP machine.  · ONLY if you wear home oxygen please bring your portable oxygen tank the day of your procedure.  · ONLY if you have a history of OPEN HEART SURGERY you will need a clearance from your Cardiologist per Anesthesia.      · ONLY for patients requiring bowel prep, written instructions will be given by your doctor's office.  · ONLY if you have a neuro stimulator, please bring the controller with you the morning of surgery  · ONLY if a type and screen test is needed before surgery, please return:  · If your doctor has scheduled you for an overnight stay, bring a small overnight bag with any personal items you need.  · Make arrangements in advance for transportation home by a responsible adult.  It is not safe to drive a vehicle during the 24 hours after anesthesia.      · Visiting hours are 10:00AM to 8:30PM.  For the safety of all patients, visitors under the age of 12 are not allowed above the first floor of the hospital.    · All [facility] facilities and properties are tobacco free.  Smoking is NOT allowed.       If you have any questions about these instructions, call Pre-Op Admit  Nursing at [phone] or the Pre-Op Day Surgery Unit at [phone].

## 2019-02-06 ENCOUNTER — ANESTHESIA EVENT (OUTPATIENT)
Dept: SURGERY | Facility: HOSPITAL | Age: 74
End: 2019-02-06
Payer: MEDICARE

## 2019-02-07 ENCOUNTER — ANESTHESIA (OUTPATIENT)
Dept: SURGERY | Facility: HOSPITAL | Age: 74
End: 2019-02-07
Payer: MEDICARE

## 2019-02-07 ENCOUNTER — HOSPITAL ENCOUNTER (OUTPATIENT)
Facility: HOSPITAL | Age: 74
Discharge: HOME | End: 2019-02-07
Attending: UROLOGY | Admitting: UROLOGY
Payer: MEDICARE

## 2019-02-07 VITALS
RESPIRATION RATE: 16 BRPM | DIASTOLIC BLOOD PRESSURE: 65 MMHG | HEIGHT: 67 IN | WEIGHT: 149 LBS | HEART RATE: 63 BPM | TEMPERATURE: 98 F | SYSTOLIC BLOOD PRESSURE: 140 MMHG | BODY MASS INDEX: 23.39 KG/M2 | OXYGEN SATURATION: 99 %

## 2019-02-07 DIAGNOSIS — C61 PROSTATE CANCER: ICD-10-CM

## 2019-02-07 LAB — POCT GLUCOSE: 172 MG/DL (ref 70–110)

## 2019-02-07 PROCEDURE — 63600175 PHARM REV CODE 636 W HCPCS: Performed by: UROLOGY

## 2019-02-07 PROCEDURE — 99900104 DSU ONLY-NO CHARGE-EA ADD'L HR (STAT): Performed by: UROLOGY

## 2019-02-07 PROCEDURE — 82962 GLUCOSE BLOOD TEST: CPT | Performed by: UROLOGY

## 2019-02-07 PROCEDURE — 71000039 HC RECOVERY, EACH ADD'L HOUR: Performed by: UROLOGY

## 2019-02-07 PROCEDURE — 76872 US TRANSRECTAL: CPT | Mod: 26,59,, | Performed by: UROLOGY

## 2019-02-07 PROCEDURE — 37000009 HC ANESTHESIA EA ADD 15 MINS: Performed by: UROLOGY

## 2019-02-07 PROCEDURE — 76872 PR US TRANSRECTAL: ICD-10-PCS | Mod: 26,59,, | Performed by: UROLOGY

## 2019-02-07 PROCEDURE — 25000003 PHARM REV CODE 250: Performed by: ANESTHESIOLOGY

## 2019-02-07 PROCEDURE — A4648 IMPLANTABLE TISSUE MARKER: HCPCS | Performed by: UROLOGY

## 2019-02-07 PROCEDURE — 55876 PLACE RT DEVICE/MARKER PROS: CPT | Mod: ,,, | Performed by: UROLOGY

## 2019-02-07 PROCEDURE — 71000033 HC RECOVERY, INTIAL HOUR: Performed by: UROLOGY

## 2019-02-07 PROCEDURE — 71000015 HC POSTOP RECOV 1ST HR: Performed by: UROLOGY

## 2019-02-07 PROCEDURE — 55874 TPRNL PLMT BIODEGRDABL MATRL: CPT | Mod: ,,, | Performed by: UROLOGY

## 2019-02-07 PROCEDURE — 27201423 OPTIME MED/SURG SUP & DEVICES STERILE SUPPLY: Performed by: UROLOGY

## 2019-02-07 PROCEDURE — 36000706: Performed by: UROLOGY

## 2019-02-07 PROCEDURE — D9220A PRA ANESTHESIA: ICD-10-PCS | Mod: ANES,,, | Performed by: ANESTHESIOLOGY

## 2019-02-07 PROCEDURE — 37000008 HC ANESTHESIA 1ST 15 MINUTES: Performed by: UROLOGY

## 2019-02-07 PROCEDURE — 63600175 PHARM REV CODE 636 W HCPCS: Performed by: NURSE ANESTHETIST, CERTIFIED REGISTERED

## 2019-02-07 PROCEDURE — 71000016 HC POSTOP RECOV ADDL HR: Performed by: UROLOGY

## 2019-02-07 PROCEDURE — 55874 PR PLACEMENT, BIODEGR MATERIAL, TRANSPERINEAL, W/IMG GUID: ICD-10-PCS | Mod: ,,, | Performed by: UROLOGY

## 2019-02-07 PROCEDURE — 99900103 DSU ONLY-NO CHARGE-INITIAL HR (STAT): Performed by: UROLOGY

## 2019-02-07 PROCEDURE — 25000003 PHARM REV CODE 250: Performed by: NURSE ANESTHETIST, CERTIFIED REGISTERED

## 2019-02-07 PROCEDURE — D9220A PRA ANESTHESIA: Mod: ANES,,, | Performed by: ANESTHESIOLOGY

## 2019-02-07 PROCEDURE — 36000707: Performed by: UROLOGY

## 2019-02-07 PROCEDURE — D9220A PRA ANESTHESIA: ICD-10-PCS | Mod: CRNA,,, | Performed by: NURSE ANESTHETIST, CERTIFIED REGISTERED

## 2019-02-07 PROCEDURE — D9220A PRA ANESTHESIA: Mod: CRNA,,, | Performed by: NURSE ANESTHETIST, CERTIFIED REGISTERED

## 2019-02-07 PROCEDURE — 55876 PR PLACE RADIOTHER DEVICE/MARKER, PROSTATE: ICD-10-PCS | Mod: ,,, | Performed by: UROLOGY

## 2019-02-07 RX ORDER — OXYCODONE AND ACETAMINOPHEN 5; 325 MG/1; MG/1
1 TABLET ORAL
Status: DISCONTINUED | OUTPATIENT
Start: 2019-02-07 | End: 2019-02-07 | Stop reason: HOSPADM

## 2019-02-07 RX ORDER — FENTANYL CITRATE 50 UG/ML
INJECTION, SOLUTION INTRAMUSCULAR; INTRAVENOUS
Status: DISCONTINUED | OUTPATIENT
Start: 2019-02-07 | End: 2019-02-07

## 2019-02-07 RX ORDER — CIPROFLOXACIN 500 MG/1
500 TABLET ORAL 2 TIMES DAILY PRN
Qty: 6 TABLET | Refills: 0 | Status: SHIPPED | OUTPATIENT
Start: 2019-02-07 | End: 2019-05-30 | Stop reason: ALTCHOICE

## 2019-02-07 RX ORDER — CEFAZOLIN SODIUM 2 G/50ML
2 SOLUTION INTRAVENOUS
Status: COMPLETED | OUTPATIENT
Start: 2019-02-07 | End: 2019-02-07

## 2019-02-07 RX ORDER — SODIUM CHLORIDE, SODIUM LACTATE, POTASSIUM CHLORIDE, CALCIUM CHLORIDE 600; 310; 30; 20 MG/100ML; MG/100ML; MG/100ML; MG/100ML
INJECTION, SOLUTION INTRAVENOUS CONTINUOUS
Status: DISCONTINUED | OUTPATIENT
Start: 2019-02-07 | End: 2019-02-07 | Stop reason: HOSPADM

## 2019-02-07 RX ORDER — LIDOCAINE HCL/PF 100 MG/5ML
SYRINGE (ML) INTRAVENOUS
Status: DISCONTINUED | OUTPATIENT
Start: 2019-02-07 | End: 2019-02-07

## 2019-02-07 RX ORDER — DEXAMETHASONE SODIUM PHOSPHATE 4 MG/ML
INJECTION, SOLUTION INTRA-ARTICULAR; INTRALESIONAL; INTRAMUSCULAR; INTRAVENOUS; SOFT TISSUE
Status: DISCONTINUED | OUTPATIENT
Start: 2019-02-07 | End: 2019-02-07

## 2019-02-07 RX ORDER — SODIUM CHLORIDE 0.9 % (FLUSH) 0.9 %
3 SYRINGE (ML) INJECTION
Status: DISCONTINUED | OUTPATIENT
Start: 2019-02-07 | End: 2019-02-07 | Stop reason: HOSPADM

## 2019-02-07 RX ORDER — GENTAMICIN SULFATE 80 MG/100ML
80 INJECTION, SOLUTION INTRAVENOUS
Status: COMPLETED | OUTPATIENT
Start: 2019-02-07 | End: 2019-02-07

## 2019-02-07 RX ORDER — GLYCOPYRROLATE 0.2 MG/ML
INJECTION INTRAMUSCULAR; INTRAVENOUS
Status: DISCONTINUED | OUTPATIENT
Start: 2019-02-07 | End: 2019-02-07

## 2019-02-07 RX ORDER — ONDANSETRON 2 MG/ML
INJECTION INTRAMUSCULAR; INTRAVENOUS
Status: DISCONTINUED | OUTPATIENT
Start: 2019-02-07 | End: 2019-02-07

## 2019-02-07 RX ORDER — METOCLOPRAMIDE HYDROCHLORIDE 5 MG/ML
10 INJECTION INTRAMUSCULAR; INTRAVENOUS EVERY 10 MIN PRN
Status: DISCONTINUED | OUTPATIENT
Start: 2019-02-07 | End: 2019-02-07 | Stop reason: HOSPADM

## 2019-02-07 RX ORDER — MIDAZOLAM HYDROCHLORIDE 1 MG/ML
INJECTION, SOLUTION INTRAMUSCULAR; INTRAVENOUS
Status: DISCONTINUED | OUTPATIENT
Start: 2019-02-07 | End: 2019-02-07

## 2019-02-07 RX ORDER — FENTANYL CITRATE 50 UG/ML
25 INJECTION, SOLUTION INTRAMUSCULAR; INTRAVENOUS EVERY 5 MIN PRN
Status: DISCONTINUED | OUTPATIENT
Start: 2019-02-07 | End: 2019-02-07 | Stop reason: HOSPADM

## 2019-02-07 RX ORDER — LIDOCAINE HYDROCHLORIDE 10 MG/ML
1 INJECTION, SOLUTION EPIDURAL; INFILTRATION; INTRACAUDAL; PERINEURAL ONCE
Status: DISCONTINUED | OUTPATIENT
Start: 2019-02-07 | End: 2019-02-07 | Stop reason: HOSPADM

## 2019-02-07 RX ORDER — PROPOFOL 10 MG/ML
VIAL (ML) INTRAVENOUS
Status: DISCONTINUED | OUTPATIENT
Start: 2019-02-07 | End: 2019-02-07

## 2019-02-07 RX ADMIN — GENTAMICIN SULFATE 80 MG: 80 INJECTION, SOLUTION INTRAVENOUS at 07:02

## 2019-02-07 RX ADMIN — DEXAMETHASONE SODIUM PHOSPHATE 8 MG: 4 INJECTION, SOLUTION INTRAMUSCULAR; INTRAVENOUS at 09:02

## 2019-02-07 RX ADMIN — GLYCOPYRROLATE 0.2 MG: 0.2 INJECTION, SOLUTION INTRAMUSCULAR; INTRAVENOUS at 09:02

## 2019-02-07 RX ADMIN — SODIUM CHLORIDE, SODIUM LACTATE, POTASSIUM CHLORIDE, AND CALCIUM CHLORIDE: .6; .31; .03; .02 INJECTION, SOLUTION INTRAVENOUS at 07:02

## 2019-02-07 RX ADMIN — FENTANYL CITRATE 50 MCG: 50 INJECTION, SOLUTION INTRAMUSCULAR; INTRAVENOUS at 08:02

## 2019-02-07 RX ADMIN — LIDOCAINE HYDROCHLORIDE 100 MG: 20 INJECTION, SOLUTION INTRAVENOUS at 09:02

## 2019-02-07 RX ADMIN — PROPOFOL 120 MG: 10 INJECTION, EMULSION INTRAVENOUS at 09:02

## 2019-02-07 RX ADMIN — MIDAZOLAM 1 MG: 1 INJECTION INTRAMUSCULAR; INTRAVENOUS at 08:02

## 2019-02-07 RX ADMIN — ONDANSETRON 4 MG: 2 INJECTION, SOLUTION INTRAMUSCULAR; INTRAVENOUS at 09:02

## 2019-02-07 RX ADMIN — CEFAZOLIN SODIUM 2 G: 2 SOLUTION INTRAVENOUS at 09:02

## 2019-02-07 NOTE — DISCHARGE INSTRUCTIONS
"Discharge Instructions: After Your Surgery/Procedure  Youve just had surgery. During surgery you were given medicine called anesthesia to keep you relaxed and free of pain. After surgery you may have some pain or nausea. This is common. Here are some tips for feeling better and getting well after surgery.     Stay on schedule with your medication.   Going home  Your doctor or nurse will show you how to take care of yourself when you go home. He or she will also answer your questions. Have an adult family member or friend drive you home.      For your safety we recommend these precaution for the first 24 hours after your procedure:  · Do not drive or use heavy equipment.  · Do not make important decisions or sign legal papers.  · Do not drink alcohol.  · Have someone stay with you, if needed. He or she can watch for problems and help keep you safe.  · Your concentration, balance, coordination, and judgement may be impaired for many hours after anesthesia.  Use caution when ambulating or standing up.     · You may feel weak and "washed out" after anesthesia and surgery.      Subtle residual effects of general anesthesia or sedation with regional / local anesthesia can last more than 24 hours.  Rest for the remainder of the day or longer if your Doctor/Surgeon has advised you to do so.  Although you may feel normal within the first 24 hours, your reflexes and mental ability may be impaired without you realizing it.  You may feel dizzy, lightheaded or sleepy for 24 hours or longer.      Be sure to go to all follow-up visits with your doctor. And rest after your surgery for as long as your doctor tells you to.  Coping with pain  If you have pain after surgery, pain medicine will help you feel better. Take it as told, before pain becomes severe. Also, ask your doctor or pharmacist about other ways to control pain. This might be with heat, ice, or relaxation. And follow any other instructions your surgeon or nurse gives " you.  Tips for taking pain medicine  To get the best relief possible, remember these points:  · Pain medicines can upset your stomach. Taking them with a little food may help.  · Most pain relievers taken by mouth need at least 20 to 30 minutes to start to work.  · Taking medicine on a schedule can help you remember to take it. Try to time your medicine so that you can take it before starting an activity. This might be before you get dressed, go for a walk, or sit down for dinner.  · Constipation is a common side effect of pain medicines. Call your doctor before taking any medicines such as laxatives or stool softeners to help ease constipation. Also ask if you should skip any foods. Drinking lots of fluids and eating foods such as fruits and vegetables that are high in fiber can also help. Remember, do not take laxatives unless your surgeon has prescribed them.  · Drinking alcohol and taking pain medicine can cause dizziness and slow your breathing. It can even be deadly. Do not drink alcohol while taking pain medicine.  · Pain medicine can make you react more slowly to things. Do not drive or run machinery while taking pain medicine.  Your health care provider may tell you to take acetaminophen to help ease your pain. Ask him or her how much you are supposed to take each day. Acetaminophen or other pain relievers may interact with your prescription medicines or other over-the-counter (OTC) drugs. Some prescription medicines have acetaminophen and other ingredients. Using both prescription and OTC acetaminophen for pain can cause you to overdose. Read the labels on your OTC medicines with care. This will help you to clearly know the list of ingredients, how much to take, and any warnings. It may also help you not take too much acetaminophen. If you have questions or do not understand the information, ask your pharmacist or health care provider to explain it to you before you take the OTC medicine.  Managing  nausea  Some people have an upset stomach after surgery. This is often because of anesthesia, pain, or pain medicine, or the stress of surgery. These tips will help you handle nausea and eat healthy foods as you get better. If you were on a special food plan before surgery, ask your doctor if you should follow it while you get better. These tips may help:  · Do not push yourself to eat. Your body will tell you when to eat and how much.  · Start off with clear liquids and soup. They are easier to digest.  · Next try semi-solid foods, such as mashed potatoes, applesauce, and gelatin, as you feel ready.  · Slowly move to solid foods. Dont eat fatty, rich, or spicy foods at first.  · Do not force yourself to have 3 large meals a day. Instead eat smaller amounts more often.  · Take pain medicines with a small amount of solid food, such as crackers or toast, to avoid nausea.     Call your surgeon if  · You still have pain an hour after taking medicine. The medicine may not be strong enough.  · You feel too sleepy, dizzy, or groggy. The medicine may be too strong.  · You have side effects like nausea, vomiting, or skin changes, such as rash, itching, or hives.       If you have obstructive sleep apnea  You were given anesthesia medicine during surgery to keep you comfortable and free of pain. After surgery, you may have more apnea spells because of this medicine and other medicines you were given. The spells may last longer than usual.   At home:  · Keep using the continuous positive airway pressure (CPAP) device when you sleep. Unless your health care provider tells you not to, use it when you sleep, day or night. CPAP is a common device used to treat obstructive sleep apnea.  · Talk with your provider before taking any pain medicine, muscle relaxants, or sedatives. Your provider will tell you about the possible dangers of taking these medicines.  © 3950-4187 [employer]. [address] [place] [address]  [place] [address]. All rights reserved. This information is not intended as a substitute for professional medical care. Always follow your healthcare professional's instructions.  Post op instructions for prevention of DVT  What is deep vein thrombosis?  Deep vein thrombosis (DVT) is the medical term for blood clots in the deep veins of the leg.  These blood clots can be dangerous.  A DVT can block a blood vessel and keep blood from getting where it needs to go.  Another problem is that the clot can travel to other parts of the body such as the lungs.  A clot that travels to the lungs is called a pulmonary embolus (PE) and can cause serious problems with breathing which can lead to death.  Am I at risk for DVT/PE?  If you are not very active, you are at risk of DVT.  Anyone confined to bed, sitting for long periods of time, recovering from surgery, etc. increases the risk of DVT.  Other risk factors are cancer diagnosis, certain medications, estrogen replacement in any form,older age, obesity, pregnancy, smoking, history of clotting disorders, and dehydration.  How will I know if I have a DVT?   Swelling in the lower leg   Pain   Warmth, redness, hardness or bulging of the vein  If you have any of these symptoms, call your doctors office right away.  Some people will not have any symptoms until the clot moves to the lungs.  What are the symptoms of a PE?   Panting, shortness of breath, or trouble breathing   Sharp, knife-like chest pain when you breathe   Coughing or coughing up blood   Rapid heartbeat  If you have any of these symptoms or get worse quickly, call 911 for emergency treatment.  How can I prevent a DVT?   Avoid long periods of inactivity and dont cross your legs--get up and walk around every hour or so.   Stay active--walking after surgery is highly encouraged.  This means you should get out of the house and walk in the neighborhood.  Going up and down stairs will not impair healing (unless advised  against such activity by your doctor).     Drink plenty of noncaffeinated, nonalcoholic fluids each day to prevent dehydration.   Wear special support stockings, if they have been advised by your doctor.   If you travel, stop at least once an hour and walk around.   Avoid smoking (assistance with stopping is available through your healthcare provider)  Always notify your doctor if you are not able to follow the post operative instructions that are given to you at the time of discharge.  It may be necessary to prescribe one of the medications available to prevent DVT.  General Information:    1.  Do not drink alcoholic beverages including beer for 24 hours or as long as you are on pain medication..  2.  Do not drive a motor vehicle, operate machinery or power tools, or signs legal papers for 24 hours or as long as you are on pain medication.   3.  You may experience light-headedness, dizziness, and sleepiness following surgery. Please do not stay alone. A responsible adult should be with you for this 24 hour period.  4.  Go home and rest.    5. Progress slowly to a normal diet unless instructed.  Otherwise, begin with liquids such as soft drinks, then soup and crackers working up to solid foods. Drink plenty of nonalcoholic fluids.  6.  Certain anesthetics and pain medications produce nausea and vomiting in certain       individuals. If nausea becomes a problem at home, call you doctor.    7. A nurse will be calling you sometime after surgery. Do not be alarmed. This is our way of finding out how you are doing.    8. Several times every hour while you are awake, take 2-3 deep breaths and cough. If you had stomach surgery hold a pillow or rolled towel firmly against your stomach before you cough. This will help with any pain the cough might cause.  9. Several times every hour while you are awake, pump and flex your feet 5-6 times and do foot circles. This will help prevent blood clots.    10.Call your doctor for  severe pain, bleeding, fever, or signs or symptoms of infection (pain, swelling, redness, foul odor, drainage).    We hope your stay was comfortable as you heal now, mend and rest.    For we have enjoyed taking care of you by giving your our best.    And as you get better, by regaining your health and strength;   We count it as a privilege to have served you and hope your time at [facility] was well spent.      Thank  You!!!

## 2019-02-07 NOTE — ANESTHESIA PREPROCEDURE EVALUATION
02/07/2019  Nilton Sherwood is a 73 y.o., male.    Anesthesia Evaluation    I have reviewed the Patient Summary Reports.    I have reviewed the Nursing Notes.   I have reviewed the Medications.     Review of Systems  Anesthesia Hx:  Denies Family Hx of Anesthesia complications.   Denies Personal Hx of Anesthesia complications.   Cardiovascular:   CAD  CABG/stent  Denies Dysrhythmias.  Angina ECG has been reviewed. Stents within last 12 months, Plavix held x 6 Days, AICD   Endocrine:   Diabetes, type 2        Physical Exam  General:  Well nourished    Airway/Jaw/Neck:  Airway Findings: Mouth Opening: Normal Tongue: Normal  General Airway Assessment: Adult  Mallampati: III  Improves to II with phonation.  TM Distance: 4 - 6 cm  Jaw/Neck Findings:  Neck ROM: Extension Decreased, Mild      Dental:  Dental Findings:    Chest/Lungs:  Chest/Lungs Clear    Heart/Vascular:  Heart Findings: Normal            Anesthesia Plan  Type of Anesthesia, risks & benefits discussed:  Anesthesia Type:  general  Patient's Preference:   Intra-op Monitoring Plan: standard ASA monitors  Intra-op Monitoring Plan Comments:   Post Op Pain Control Plan:   Post Op Pain Control Plan Comments:   Induction:   IV  Beta Blocker:  Patient is on a Beta-Blocker and has received one dose within the past 24 hours (No further documentation required).       Informed Consent: Patient understands risks and agrees with Anesthesia plan.  Questions answered. Anesthesia consent signed with patient.  ASA Score: 3     Day of Surgery Review of History & Physical:    H&P update referred to the surgeon.         Ready For Surgery From Anesthesia Perspective.

## 2019-02-07 NOTE — H&P
[facility] Progress Note     Nilton Sherwood is a 73 y.o. male who presents for prostate cancer follow up     He was originally referred to our practice by Dr Merrill when his psa was found to be 7.0, which was a rise from 4.2 in 2015. He does have a history of prostate biopsy in  by  at   [facility]. Trus bx results 14 were initially benign then amended to report ASAP in [place] and [facility].   Biopsy by Dr Rodriguez on 17 revealed 23.7g gland with nodule/mass seen on ultrasound at [facility] and targeted with extra biopsy cores, which were positive.  4 core positive isaiah 3+3=6, including at area of suspicion on ultrasound. 9% 2 cores LB, 30% 1 core L TZ, 26% 1 core LA  Despite his low grade pathology, he was recommended to undergo treatment given his history of elevated psa and fairly exophytic lesion on ultrasound increasing risk for extraprostatic extension.   He saw Dr Garcia on 17 to discuss radiation therapy, and saw me 3/14/17 to discuss surgical management, specifically robotic prostatectomy, which he preferred to proceed with.   AUA SS . Not sexually active.     He did however present to Dr Merrill's office on 3/10/17 with acute onset sudden L severe shoulder pain. In office EKG reportedly abnormal and different than previous EKGs on file and he was sent to [facility] for cardiac evaluation, where he was admitted for 3 days. He was found to have a new LBBB, and an EF= 35% with cardiomyopathy with new wall motion abnormalities.  +DM (A1c 8.8 ), HTN, smoker of 8 cigs/d, and strong family history of CAD (brother had MI age 40, mother  of CHF).   He had cardiac cath done 3/23/17 and reported at least 3-vessel disease (100% occlusion of proximal LAD, 100% occlusion of proximal RCA, and 100% occlusion of obtuse marginal and circumflex systems) /Only truly patent vessel noted was a large first marginal ramus which supplied the whole heart. On 17, he  underwent CABGx3 with reverse saphenous vein grafts and use of LIMA placed in situ to LAD. Intraoperative MINESH noted mildly reduced LV function preo and postop with severe inferior hypokinesis, normal pericardium, trivial valvular abnormalities.   Bartolome Campa - [facility] cardiology, Vick Hartman - [place] - [place] surg     7/24/17, he had was asymptomatic and actively participating in cardiac rehab. + plavix 75mg daily. LUTS controlled on flomax. PSA 7.5 7/24/17. 3T MRI only PIRAD2 peripheral zone but had PIRAD 3 focal lesion involving TZ al level of L lateral mid gland. 0.1mL (0p0e8ea). No extension to PZ. Continued AS  10/17 his EF was down to 25% and cardiology had discussed implanting defibrillator. Echo 2/27/18 revealed EF 20-25% despite appropriate medical therapy and therefore had BiV AICD implantation on 3/21/18 with Dr. Gonzalez for ischemic cardiomyopathy and left bundle branch block.  Had LANNY to pCirc and left main as well as PCI to mCirc, necessitating anticoagulation  4/20/18 - psa 6.3. LUTS well controlled on flomax. No new back or bone pain.  5/15/18 - 1 year confirmatory AS prostate biopsy - 7/17 cores G6. 28.3cc gland  PATH: LB lat 5% 2/2 +PNI, LB medial 7%, LM lat 5%, LM medial 5%, LA lat 5%, LTZ 25%  * LB nodule cores benign     He returns today noting  He had recent workup by primary care for headaches/dizziness with negative carotid US and has seen neuro who did negative til test  He had a repeat psa 12/3/18 of 10.5 with very low free psa of 0.8   UA negative  AUA SS 5/2 mostly satisfied  - 2 sleeping, 1: freq/int/weak stream  12/6/18 changed flomax to doxazosin 1 mg by dr campa 2/2 dizziness. Sxs controlled as above  No new back pain or bone pain  No hematuria or dysuria     ROS: A comprehensive 10 system review was performed and is negative except as noted above in HPI     PHYSICAL EXAM:         Vitals:     12/17/18 1317   BP: 109/65   Pulse: 66   Resp: 18   Temp: 98 °F (36.7 °C)      Body  "mass index is 23.13 kg/m². Weight: 67 kg (147 lb 11.3 oz) Height: 5' 7" (170.2 cm)         General: Alert, cooperative, no distress, appears stated age   Head: Normocephalic, without obvious abnormality, atraumatic   Eyes: PERRL, conjunctiva/corneas clear   Lungs: Respirations unlabored   Heart: Warm and well perfused   Abdomen: soft NT ND  Extremities: Extremities normal, atraumatic, no cyanosis or edema   Skin: Skin color, texture, turgor normal, no rashes or lesions   Psych: Appropriate   Neurologic: Non-focal         Recent Results         Recent Results (from the past 336 hour(s))   POCT URINE DIPSTICK WITHOUT MICROSCOPE     Collection Time: 12/17/18  1:24 PM   Result Value Ref Range     Color, UA yellow       Spec Grav UA 1.015       pH, UA 6       WBC, UA neg       Nitrite, UA neg       Protein neg       Glucose, UA neg       Ketones, UA neg       Urobilinogen, UA neg       Bilirubin neg       Blood, UA neg              ASSESSMENT   1. Prostate cancer  POCT URINE DIPSTICK WITHOUT MICROSCOPE         Plan    Patient has been doing well on active surveillance, which he essentially chose by default given his significant cardiac history.  His restaging biopsy earlier this year confirmed all isaiah 6 disease in low volume each of a moderate number or cores  Initially averse to radiotherapy as well given social/travel constraints. No retired and has transportation     PSA has had interval rise, now >10, and advised to consider treatment, and since poor surgical candidate with his CAD/CHF, discuss radiotherapy.  Discussed XRT vs XRT+ADT. Given isaiah 6 pathology, may be reasonable to do without ADT. As well, the ADT may pose a cardiac risk given his poor cardiovascular status and low EF.  Will discuss need for ADT with rad/onc and risk with cardiology.     As well, will need cardiology to clear to hold asa/plavix for marker placement, though he was able to do so for confirmatory biopsy.   We discussed options for " marker placement such as transrectal with local, or transperineal with concurrent placement of spaceoar hydrogel to decrease complications of radiation and increase quality of life, done in OR with sedation/MAC-local vs general LMA. He prefers the latter and after risk benefit discussion, appropriate informed consent was obtained for transperineal fiducial marker placement and placement of sadny prostatic spacing Hydrogel in the OR.  Will discuss with cardiology not only the ability to hold bloodthinners for the procedure, but also undergo brief mac/local vs general lma for spaceoar+markers.  If not cleared for OR can place markers alone transrectally with local.     Tentative date of 2/7/19 for markers+spaceOAR pending above clearance/plan.  40 mins spent in encounter, over half in counseling      Pt has held bloodthinners  proceed

## 2019-02-07 NOTE — ANESTHESIA POSTPROCEDURE EVALUATION
"Anesthesia Post Evaluation    Patient: Nilotn Sherwood    Procedure(s) Performed: Procedure(s) (LRB):  ULTRASOUND, PROSTATE, Perineal APPROACH, WITH GOLD FIDUCIAL MARKER INSERTION (N/A)  TRANSPERINEAL INSERTION OF PERIPROSTATIC GEL (N/A)    Final Anesthesia Type: general  Patient location during evaluation: PACU  Patient participation: Yes- Able to Participate  Level of consciousness: awake and alert  Post-procedure vital signs: reviewed and stable  Pain management: adequate  Airway patency: patent  PONV status at discharge: No PONV  Anesthetic complications: no      Cardiovascular status: blood pressure returned to baseline  Respiratory status: unassisted  Hydration status: euvolemic  Follow-up not needed.        Visit Vitals  /63   Pulse 66   Temp 36.6 °C (97.9 °F) (Temporal)   Resp 16   Ht 5' 7" (1.702 m)   Wt 67.6 kg (149 lb)   SpO2 98%   BMI 23.34 kg/m²       Pain/Lonny Score: Lonny Score: 10 (2/7/2019 10:55 AM)        "

## 2019-02-07 NOTE — BRIEF OP NOTE
[facility]  Brief Operative/Discharge Note    Date: 02/07/2019    Staff Surgeon: Narinder Her MD    Pre-Op Diagnosis: prostate cancer    Post-Op Diagnosis: same    Procedure(s) Performed:   Fiducial markers  Spaceoar    Specimen(s): none    Anesthesia: General LMA anesthesia    Findings: volume 21.6 w 47.6, h 27.7, L 31.4  spaceoar 12.6x26.3    Estimated Blood Loss: minimal    Drains: none    Complications: none    Disposition: pacu    Discharge home today status post uncomplicated procedure as above  Diet - resume home diet  Follow up: 1 week with Dr Garcia  Instructions: See post prostate biopsy instruction sheet. Hold bloodthinners x3 days. May feel rectal pressure, heaviness below, fecal urgency OR constipation in next few days. If constipation use stool softeners. Complete antibiotics as directed - with 2 doses today.  Meds:     Medication List      START taking these medications    ciprofloxacin HCl 500 MG tablet  Commonly known as:  CIPRO  Take 1 tablet (500 mg total) by mouth 2 (two) times daily as needed.        CONTINUE taking these medications    aspirin 81 MG EC tablet  Commonly known as:  ECOTRIN     atorvastatin 40 MG tablet  Commonly known as:  LIPITOR     carvedilol 6.25 MG tablet  Commonly known as:  COREG  Take 1 tablet (6.25 mg total) by mouth 2 (two) times daily.     clopidogrel 75 mg tablet  Commonly known as:  PLAVIX     clotrimazole-betamethasone 1-0.05% cream  Commonly known as:  LOTRISONE  Apply topically 2 (two) times daily.     cyanocobalamin (vitamin B-12) 1,000 mcg Tbsr  Take 1,000 mcg by mouth once daily.     doxazosin 1 MG tablet  Commonly known as:  CARDURA     lisinopril 2.5 MG tablet  Commonly known as:  PRINIVIL,ZESTRIL  Take 1 tablet (2.5 mg total) by mouth once daily.     metFORMIN 1000 MG tablet  Commonly known as:  GLUCOPHAGE  Take 1 tablet (1,000 mg total) by mouth 2 (two) times daily with meals.           Where to Get Your Medications      These medications were  sent to [facility] [id number] [place] [address], [place] - [address] [facility] [address]  [address] [place] [address]    Phone:  [phone]   · ciprofloxacin HCl 500 MG tablet

## 2019-02-07 NOTE — OP NOTE
[facility] Urology Operative Report     Date: 2/7/19     Staff Surgeon: Narinder Her MD     Pre-Op Diagnosis: prostate cancer     Post-Op Diagnosis: same     Procedure(s) Performed:   Transperineal placement of periprostatic biodegradable spacing gel (SpaceOAR) with ultrasound needle guidance (81779)  Transperineal placement of gold seed fiducial markers into prostate (48256)  Transrectal ultrasound of prostate including volumetric measurement (93236)     Specimen(s): none     Anesthesia: General LMA anesthesia     Findings: normal prostate anatomy, well defined planes  Prostate volume 21.6 cm3 (W: 47.6mm, H: 27.7mm, L 31.4mm)  Appropriate displacement of anterior rectum from prostate, with 12.6x26.3mm space created by SpaceOAR     Estimated Blood Loss: minimal     Drains: none     Complications: none     Indications for procedure:  73-year-old man with high volume Idamay 6 cT2 prostate cancer who has started androgen deprivation therapy and is pending external beam radiation.  He presents today for fiducial marker placement as well as SpaceOAR placement of absorbable polyethylene glycol Hydrogel to displace rectum away from prostate to decrease complications of and increased quality of life during and after radiotherapy, after extensive discussion of risks and benefits.  After extensive discussion of risks and benefits, and answering all questions, appropriate informed consent was obtained for the aforementioned procedures.     Procedure in detail:  After appropriate informed consent was obtained the patient was taken to the operating room and placed in dorsal lithotomy position.  Preoperative antibiotics were administered, and a WHO approved time-out was performed.     His scrotum was taped out of the way, and perineum prepped with ChloraPrep.  The side-fire transrectal ultrasound probe was secured to mounting device and passed into the rectum.  Three-dimensional measurements of the prostate were taken with  combination of axial and sagittal plane views as noted above, for total volume of 21.6cc.  No gross abnormalities of the prostate were noted, and seminal vesicles appeared normal bilaterally.  He did have clear well defined anatomy with a low rectal hump and distinctly visible hyperechoic denonvillers fascia.       Under transrectal ultrasound guidance, 3 prostate gold seed fiducial markers were placed transperineally, continuously visualizing needle tip and inserting these markers at the left lateral mid gland, as well as right medial base and right medial apex.  Axial view was then obtained to see the shadowing of these markers and confirm their placement.     At this time, the SpaceOAR Hydrogel was prepared and reconstituted as per  instructions and loaded into the injection syringe Y connector. In the sagittal plane, under transrectal ultrasound guidance, the 15 cm 18 gauge needle was passed transperineally and seen to pass through the rectal urethralis muscle slowly advancing the needle tip into the perirectal fat inferior to the prostate, following Denonviller's fascia inferior to the prostate, while passing over the rectal hump and staying anterior to the rectal wall.  The needle tip position was confirmed mid gland in the appropriate space in both the sagittal and axial field.  Saline was used to hydro dissect this space between the fascia and anterior rectal wall, with test 1st in the axial plane, followed by creating the space with hydrodissection in the sagittal plane.     Again, with the needle tip at mid gland, the axial field was used to confirm the needle was not in the rectal wall or tenting the rectal wall with movement of the needle tip without corresponding movement of the rectal wall to confirm perirectal placement.  While maintaining needle position, aspiration was performed to confirm position was not within a vascular space.  The needle was then held in this stable position,  confirmed by ultrasound, and the saline syringe was exchanged for the previously assembled SpaceOAR delivery system.     Under ultrasound guidance in the sagittal plane, is smooth continuous injection technique was used to dispense Hydrogel into the space between the prostate and rectum.  All 10 cc were injected as a continuous injection without pause over 15 sec maintaining optimal visualization of the needle during Hydrogel administration at all times.  Desired result was noted, with good spread of the Hydrogel into this sandy prostatic space.  Ultrasound measurement of the height of this space, and thus the displacement of the prostate from the anterior rectal wall, was measured to be 13.4 mm.      The patient tolerated the procedure well.  There were no complications.  There was no suspected penetration or compromise of the rectal wall.  Perineal pressure was held for 5 minutes postprocedure without any bleeding noted afterwards.     Disposition:  He will be discharged home today after uncomplicated procedure as above.  He will complete his course of prophylactic ciprofloxacin, and follow up next week with Radiation Oncology for CT simulation for his radiotherapy as planned

## 2019-02-07 NOTE — TRANSFER OF CARE
"Anesthesia Transfer of Care Note    Patient: Nilton Sherwood    Procedure(s) Performed: Procedure(s) (LRB):  ULTRASOUND, PROSTATE, Perineal APPROACH, WITH GOLD FIDUCIAL MARKER INSERTION (N/A)  TRANSPERINEAL INSERTION OF PERIPROSTATIC GEL (N/A)    Patient location: PACU    Anesthesia Type: general    Transport from OR: Transported from OR on 2-3 L/min O2 by NC with adequate spontaneous ventilation    Post pain: adequate analgesia    Post assessment: no apparent anesthetic complications and tolerated procedure well    Post vital signs: stable    Level of consciousness: sedated    Nausea/Vomiting: no nausea/vomiting    Complications: none    Transfer of care protocol was followed      Last vitals:   Visit Vitals  BP (!) 149/67 (BP Location: Right arm, Patient Position: Lying)   Pulse 64   Temp 36.5 °C (97.7 °F) (Skin)   Resp 16   Ht 5' 7" (1.702 m)   Wt 67.6 kg (149 lb)   SpO2 99%   BMI 23.34 kg/m²     "

## 2019-02-07 NOTE — PLAN OF CARE
1118:  Patient transferred to Phase II.  Report to PARUL Molina.  Released from Anesthesia.  Denies pain/nausea.  Drinking coffee/eating rich crackers.  NAD noted.

## 2019-02-08 ENCOUNTER — OFFICE VISIT (OUTPATIENT)
Dept: PODIATRY | Facility: CLINIC | Age: 74
End: 2019-02-08
Payer: MEDICARE

## 2019-02-08 VITALS
HEIGHT: 67 IN | DIASTOLIC BLOOD PRESSURE: 63 MMHG | HEART RATE: 68 BPM | BODY MASS INDEX: 23.39 KG/M2 | TEMPERATURE: 98 F | SYSTOLIC BLOOD PRESSURE: 111 MMHG | WEIGHT: 149 LBS

## 2019-02-08 DIAGNOSIS — S99.929A INJURY OF NAIL BED OF TOE: ICD-10-CM

## 2019-02-08 DIAGNOSIS — E11.65 TYPE 2 DIABETES MELLITUS WITH HYPERGLYCEMIA, WITHOUT LONG-TERM CURRENT USE OF INSULIN: Primary | ICD-10-CM

## 2019-02-08 PROCEDURE — 99999 PR PBB SHADOW E&M-EST. PATIENT-LVL III: ICD-10-PCS | Mod: PBBFAC,,, | Performed by: PODIATRIST

## 2019-02-08 PROCEDURE — 99214 OFFICE O/P EST MOD 30 MIN: CPT | Mod: S$GLB,,, | Performed by: PODIATRIST

## 2019-02-08 PROCEDURE — 3045F PR MOST RECENT HEMOGLOBIN A1C LEVEL 7.0-9.0%: ICD-10-PCS | Mod: CPTII,S$GLB,, | Performed by: PODIATRIST

## 2019-02-08 PROCEDURE — 1101F PT FALLS ASSESS-DOCD LE1/YR: CPT | Mod: CPTII,S$GLB,, | Performed by: PODIATRIST

## 2019-02-08 PROCEDURE — 3045F PR MOST RECENT HEMOGLOBIN A1C LEVEL 7.0-9.0%: CPT | Mod: CPTII,S$GLB,, | Performed by: PODIATRIST

## 2019-02-08 PROCEDURE — 99214 PR OFFICE/OUTPT VISIT, EST, LEVL IV, 30-39 MIN: ICD-10-PCS | Mod: S$GLB,,, | Performed by: PODIATRIST

## 2019-02-08 PROCEDURE — 99999 PR PBB SHADOW E&M-EST. PATIENT-LVL III: CPT | Mod: PBBFAC,,, | Performed by: PODIATRIST

## 2019-02-08 PROCEDURE — 1101F PR PT FALLS ASSESS DOC 0-1 FALLS W/OUT INJ PAST YR: ICD-10-PCS | Mod: CPTII,S$GLB,, | Performed by: PODIATRIST

## 2019-02-11 ENCOUNTER — TELEPHONE (OUTPATIENT)
Dept: UROLOGY | Facility: CLINIC | Age: 74
End: 2019-02-11

## 2019-02-11 NOTE — PROGRESS NOTES
Subjective:      Patient ID: Nilton Sherwood is a 73 y.o. male.    Chief Complaint: Foot Problem; Nail Problem; and Diabetes Mellitus  Patient presents for diabetic foot exam and  Concern regarding discoloration left great toenail.    Cannot recall injury, no drainage.  He is aware of potential complications which can occur in  the feet due to diabetes. He was referred by Dr. Merrill, it has been a while since we have   seen the patient.  He reports 5 year history of diabetes.  Reports it is well controlled.  He was   concerned when he noticed bruising in dried blood along the side of the left great toe which   became painful and swollen, has improved some. Denies drainage.   Patient has a history of heart disease, CAD with coronary stent, diabetes, cancer.  Takes   Plavix, daily aspirin and metformin as well as other med.  Saw ALLY Yoon 1/30/2019      ROS     Constitutional    Pleasant, well-nourished, no distress, well oriented    Cardiovascular          No chest pain, no shortness of breath    Respiratory           No cough, no congestion     Musculoskeletal        No muscle aches, no arthralgias/joint pain, no back pain, no swelling               in the extremities    Neurologic         No weakness, no numbness    Psychiatric   No depression, no anxiety    Endocrine          DIABETES        Objective:      Physical Exam  Vascular         Arterial Pulses Right: posterior tibialis 2/4, dorsalis pedis 2/4, normal CFT   Arterial Pulses Left: posterior tibialis 2/4, dorsalis pedis 2/4, normal CFT   No lower extremity edema bilateral   Pedal skin temperature and color are normal bilateral     Integumentary   There is bruising and dried blood at the base of the left hallux nail, consistent with trauma.        Remaining distal aspect of the nail is fungal with yellow discoloration but attached to the        underlying nail bed. No surrounding erythema or edema.  Nail was debrided, no evidence        of drainage  or subungual abscess. SEE DIGITAL PHOTO   Skin and nails are in good condition bilateral feet   Bony foot type and structure with lack of fat pad       Neurological   Gross sensation intact with monofilament on all areas bilateral feet.  No paresthesias.    Musculoskeletal   Muscle Strength/Testing and Tone:  Intact, normal tone bilateral   Joints, Bones, and Muscles: Normal with normal ROM,   Consistent with age        Walks well unassisted      Hemoglobin A1c    Ref Range & Units 2wk ago 1yr ago   Hemoglobin A1C <5.7 % of total Hgb 7.2 Abnormally high   6.8 Abnormally high  R, CM                   Assessment:       Encounter Diagnoses   Name Primary?    Type 2 diabetes mellitus with hyperglycemia, without long-term current use of insulin Yes    Injury of nail bed of toe - Left Foot          Plan:       Nilton was seen today for foot problem, nail problem and diabetes mellitus.    Diagnoses and all orders for this visit:    Type 2 diabetes mellitus with hyperglycemia, without long-term current use of insulin    Injury of nail bed of toe - Left Foot      Diabetic pedal exam performed.  Reviewed diabetic education,  neuropathy. Discussed benefit   of tight(er) control of glucose/diabetes regarding potential foot problems especially neuropathy.    Reviewed appropriate shoes,  especially indoors to protect feet, no flat shoes, slippers or walking   in sock or bare feet.  Discussed maintenance of skin and nails and potential complications.    Advised patient there definitely was trauma under the nail, discoloration is dried blood, as long as   nail stays attached to the underlying skin should grow out without complication, however advised   he needs to check it least a few times each week to make sure there is no new fluid developing   under the nail.  Advised infection due to an abscess under the nail can progress quickly with pain,   redness and swelling in if any of these occur he needs to contact the office for  follow-up.  Instructed patient to keep the nail cut short and smooth.  Reviewed need for daily foot checks and instructed patient to contact the office with any area of   redness or swelling which has not improved within 3 days.  Reviewed potential complications due to diabetes.  Counseled the patient on his conditions, their implications and medical management.  Instructed patient to contact the office with any changes, questions, concerns, worsening of symptoms.   Patient verbalized understanding.   Total face to face time, exam, assessment, treatment, discussion, documentation 25 minutes, more   than half this time spent on consultation and coordination of care.   Follow up as needed.      This note was created using [employer] voice recognition software that occasionally misinterpreted   phrases or words.

## 2019-02-11 NOTE — TELEPHONE ENCOUNTER
Patient is confused about some markings on his discharge paperwork.    He wants to clarify that the gold seeds were placed and the spaceoar gel was placed as well as planned.

## 2019-02-11 NOTE — TELEPHONE ENCOUNTER
----- Message from Yumiko Chakraborty sent at 2/11/2019  9:30 AM CST -----  Contact: pt  Type: Needs Medical Advice    Who Called: patient  Best Call Back Number: [phone] (home)   Additional Information: patient want a call back he needs to ask a question about a procedure that he had on the 7th

## 2019-04-22 DIAGNOSIS — C61 MALIGNANT NEOPLASM OF PROSTATE: Primary | ICD-10-CM

## 2019-04-22 LAB — PSA, DIAGNOSTIC: 3.8 NG/ML (ref 0–3)

## 2019-05-16 ENCOUNTER — OFFICE VISIT (OUTPATIENT)
Dept: RADIATION ONCOLOGY | Facility: CLINIC | Age: 74
End: 2019-05-16
Payer: MEDICARE

## 2019-05-16 DIAGNOSIS — I25.119 CORONARY ARTERY DISEASE INVOLVING NATIVE CORONARY ARTERY OF NATIVE HEART WITH ANGINA PECTORIS: Primary | ICD-10-CM

## 2019-05-16 PROCEDURE — 99024 POSTOP FOLLOW-UP VISIT: CPT | Mod: ,,, | Performed by: RADIOLOGY

## 2019-05-16 PROCEDURE — 99024 PR POST-OP FOLLOW-UP VISIT: ICD-10-PCS | Mod: ,,, | Performed by: RADIOLOGY

## 2019-05-16 NOTE — PROGRESS NOTES
Nilton Sherwood  [id number]  1945 5/16/2019  Naridner Her Md  [address] [place] [address] [address]  [address]  [place] [address]    DIAGNOSIS: Cancer Staging  Prostate cancer  Staging form: Prostate, AJCC 8th Edition  - Clinical: Stage IIA (cT2a, cN0, cM0, PSA: 10.5, Grade Group: 1) - Signed by Slick Garcia Jr., MD on 1/10/2019    REASON FOR VISIT: Routine scheduled follow-up.    HISTORY OF PRESENT ILLNESS:   73-year-old gentleman who originally saw me in February 2017 following a PSA level of 7 with transrectal ultrasound and biopsy revealing 4/17 cores positive for Shazia 3+3 disease, 9-30 percent of the cores with disease on the left side were nodules appreciated by ultrasound. Given his low risk designation and desire for therapy I recommended RP with Dr. Her. However he subsequently was appreciated to have significant coronary artery disease and proceeded with 3 vessel CABG in May 2017.    He re-presented to Dr. Hers office with PSA of 6.3 with repeat transrectal ultrasound and biopsy revealing again left-sided [place] 3+3 disease in 5-25% of 7/16 cores with targeted biopsy of nodule negative. Updated PSA from December this year is 10.5. He discussed definitive therapy again with Dr. Her who recommended definitive radiotherapy given operative risk secondary to poor ejection fraction and recent cardiac events.     Patient completed definitive radiotherapy alone to 7740 cGy ending April 2019. Treatment was well-tolerated with mild radiation cystitis.    INTERVAL HISTORY:   Today patient denies dysuria, hematuria, diarrhea or bright red blood per rectum. He endorses nocturia ×2 and reports frequency and urgency of urine and stool. He denies fatigue.    AUA 13  QOL 2  IIEF 1    Review of Systems   Constitutional: Negative for appetite change, chills, fever and unexpected weight change.   HENT:   Negative for lump/mass, mouth sores, sore throat, trouble swallowing and voice change.    Eyes: Negative for  eye problems and icterus.   Respiratory: Negative for chest tightness, cough, hemoptysis and shortness of breath.    Cardiovascular: Negative for chest pain and leg swelling.   Gastrointestinal: Negative for abdominal distention, abdominal pain, blood in stool, constipation, diarrhea, nausea and vomiting.   Genitourinary: Positive for frequency and nocturia. Negative for bladder incontinence, difficulty urinating, dysuria and hematuria.    Musculoskeletal: Negative for back pain, gait problem, neck pain and neck stiffness.   Neurological: Negative for extremity weakness, gait problem, headaches, numbness and seizures.   Hematological: Negative for adenopathy.     Past Medical History:   Diagnosis Date    Anemia 7/31/2017    B12    Cancer Feb 2017    Prostate    Cardiomyopathy     Cataract     Colon polyp     Coronary artery disease involving native coronary artery of native heart with angina pectoris 5/18/2017    Diabetes mellitus, type 2     Elevated glucose     Elevated PSA     Hyperlipidemia     Presence of stent in coronary artery 11/15/2017    Prostate cancer     Substance abuse     Tobacco abuse     Wears partial dentures     lowers     Past Surgical History:   Procedure Laterality Date    CARDIAC CATHETERIZATION  11/15/2017    LANNY to pCirc and Left main    CARDIAC DEFIBRILLATOR PLACEMENT Left 03/21/2018    CATARACT EXTRACTION Right     COLONOSCOPY      6 years ago    CORONARY ARTERY BYPASS GRAFT  05/02/2017    CABG x 3    CORONARY ARTERY BYPASS GRAFT (CABG)  05/02/2017    triple bypass    EYE SURGERY      right cataract removal    PROSTATE BIOPSY  Jan 2014    Negative    PROSTATE SURGERY      Biopsy    TRANSPERINEAL INSERTION OF PERIPROSTATIC GEL N/A 2/7/2019    [facility]    TRANSRECTAL ULTRASOUND GUIDED PROSTATE BIOPSY N/A 5/15/2018    [facility]    TRANSRECTAL ULTRASOUND GUIDED PROSTATE BIOPSY N/A 2/6/2017     [facility] by Aleta [facility] at [facility] OR    ULTRASOUND, PROSTATE, Perineal APPROACH, WITH GOLD FIDUCIAL MARKER INSERTION N/A 2/7/2019    [facility] at [facility] OR     Social History     Socioeconomic History    Marital status: Single     Spouse name: Not on file    Number of children: Not on file    Years of education: Not on file    Highest education level: Not on file   Occupational History    Occupation: Retired   Social Needs    Financial resource strain: Not on file    Food insecurity:     Worry: Not on file     Inability: Not on file    Transportation needs:     Medical: Not on file     Non-medical: Not on file   Tobacco Use    Smoking status: Light Tobacco Smoker     Packs/day: 0.25     Years: 20.00     Pack years: 5.00     Types: Cigarettes    Smokeless tobacco: Never Used    Tobacco comment: Smokes 3 cig a day   Substance and Sexual Activity    Alcohol use: No    Drug use: No    Sexual activity: Not Currently   Lifestyle    Physical activity:     Days per week: Not on file     Minutes per session: Not on file    Stress: Not on file   Relationships    Social connections:     Talks on phone: Not on file     Gets together: Not on file     Attends Shinto service: Not on file     Active member of club or organization: Not on file     Attends meetings of clubs or organizations: Not on file     Relationship status: Not on file   Other Topics Concern    Not on file   Social History Narrative    Denies social or economic concerns      Family History   Problem Relation Age of Onset    Heart disease Mother     Heart failure Mother     Heart disease Father     Heart disease Brother     Cancer Sister     Lung cancer Sister     Alzheimer's disease Maternal Aunt      Medication List with Changes/Refills   Current Medications    ASPIRIN (ECOTRIN) 81 MG EC TABLET    Take 81 mg by mouth once daily.    ATORVASTATIN (LIPITOR) 40 MG TABLET    Take 40 mg by mouth once  daily.    CARVEDILOL (COREG) 6.25 MG TABLET    Take 1 tablet (6.25 mg total) by mouth 2 (two) times daily.    CIPROFLOXACIN HCL (CIPRO) 500 MG TABLET    Take 1 tablet (500 mg total) by mouth 2 (two) times daily as needed.    CLOPIDOGREL (PLAVIX) 75 MG TABLET    Take 75 mg by mouth once daily.    CYANOCOBALAMIN, VITAMIN B-12, 1,000 MCG TBSR    Take 1,000 mcg by mouth once daily.    DOXAZOSIN (CARDURA) 1 MG TABLET    Take 1 mg by mouth every evening.    LISINOPRIL (PRINIVIL,ZESTRIL) 2.5 MG TABLET    Take 1 tablet (2.5 mg total) by mouth once daily.    METFORMIN (GLUCOPHAGE) 1000 MG TABLET    Take 1 tablet (1,000 mg total) by mouth 2 (two) times daily with meals.     Review of patient's allergies indicates:  No Known Allergies    QUALITY OF LIFE: 90%- Able to Carry on Normal Activity: Minor Symptoms of Disease    There were no vitals filed for this visit.  There is no height or weight on file to calculate BMI.    PHYSICAL EXAM:   GENERAL: alert; in no apparent distress.   HEAD: normocephalic, atraumatic.  EYES: pupils are equal, round, reactive to light and accommodation. Sclera anicteric. Conjunctiva not injected.   NOSE/THROAT: no nasal erythema or rhinorrhea. Oropharynx pink, without erythema, ulcerations or thrush.   NECK: no cervical motion rigidity; supple with no masses.  CHEST: pursed lips; Patient is speaking comfortably on room air with normal work of breathing without using accessory muscles of respiration.  CARDIOVASCULAR: regular rate and rhythm; no murmurs, rubs or gallops. + defibrillator  ABDOMEN: soft, nontender, nondistended. Bowel sounds present.   MUSCULOSKELETAL: no tenderness to palpation along the spine or scapulae. Normal range of motion.  NEUROLOGIC: cranial nerves II-XII intact bilaterally. Strength 5/5 in bilateral upper and lower extremities. No sensory deficits appreciated.  Normal gait.  EXTREMITIES: no  cyanosis, edema.  SKIN: no erythema, rashes, ulcerations noted.     ANCILLARY DATA:    PSA  4/19 3.8    ASSESSMENT: 73 y.o. male with Low-intermediate (favorable) risk prostate adenocarcinoma, [id number] GS 3+3 (7/16 cores; 5-25%), iPSA 10.5 status post 7740 cGy radiotherapy alone ending April 2019.  PLAN:   Nilton Sherwood did well with radiotherapy with minimal radiation cystitis. He continues with nocturia ×2 as well as frequency and urgency of urine but denies dysuria or hematuria. I recommended Kegel exercises as she also reports urgency of stool. I expect this will continue to breanne in the coming weeks. He denies fatigue. He would like to get his PSAs per primary care physician and I will carbon copy Dr. Her to assist with coordinating follow-up care. Recommended PSA every 3-6 months and return to clinic in 6 months.    All questions answered and contact information provided. Patient understands free to call us anytime with any questions or concerns regarding radiation therapy.    I have personally seen and evaluated this patient. Greater than 50% of this time was spent discussing coordination of care and/or counseling.    PHYSICIAN: Slick Garcia Jr, MD

## 2019-05-30 PROBLEM — R94.31 ABNORMAL EKG: Status: RESOLVED | Noted: 2017-03-10 | Resolved: 2019-05-30

## 2019-06-11 NOTE — TELEPHONE ENCOUNTER
----- Message from Cory Sullivan sent at 2/24/2017  3:29 PM CST -----  Contact: same  Patient called in and stated the physician that he was referred to ([facility]) had not been approved by his insurance so the appt was cancelled and patient just wanted Dr. Rodriguez to know.    Patient call back number is [phone]  
Authorization takes 10-14 days  
No

## 2019-06-26 DIAGNOSIS — C61 PROSTATE CANCER: Primary | ICD-10-CM

## 2019-07-16 ENCOUNTER — LAB VISIT (OUTPATIENT)
Dept: LAB | Facility: HOSPITAL | Age: 74
End: 2019-07-16
Attending: UROLOGY
Payer: MEDICARE

## 2019-07-16 DIAGNOSIS — C61 PROSTATE CANCER: ICD-10-CM

## 2019-07-16 LAB — COMPLEXED PSA SERPL-MCNC: 0.98 NG/ML (ref 0–4)

## 2019-07-16 PROCEDURE — 84153 ASSAY OF PSA TOTAL: CPT

## 2019-07-16 PROCEDURE — 36415 COLL VENOUS BLD VENIPUNCTURE: CPT

## 2019-07-23 ENCOUNTER — OFFICE VISIT (OUTPATIENT)
Dept: UROLOGY | Facility: CLINIC | Age: 74
End: 2019-07-23
Payer: MEDICARE

## 2019-07-23 VITALS
SYSTOLIC BLOOD PRESSURE: 109 MMHG | BODY MASS INDEX: 21.11 KG/M2 | HEIGHT: 67 IN | HEART RATE: 64 BPM | DIASTOLIC BLOOD PRESSURE: 65 MMHG | RESPIRATION RATE: 18 BRPM | WEIGHT: 134.5 LBS

## 2019-07-23 DIAGNOSIS — C61 PROSTATE CANCER: Primary | ICD-10-CM

## 2019-07-23 DIAGNOSIS — N13.8 BPH WITH OBSTRUCTION/LOWER URINARY TRACT SYMPTOMS: ICD-10-CM

## 2019-07-23 DIAGNOSIS — N40.1 BPH WITH OBSTRUCTION/LOWER URINARY TRACT SYMPTOMS: ICD-10-CM

## 2019-07-23 LAB — POC RESIDUAL URINE VOLUME: 17 ML (ref 0–100)

## 2019-07-23 PROCEDURE — 1101F PR PT FALLS ASSESS DOC 0-1 FALLS W/OUT INJ PAST YR: ICD-10-PCS | Mod: CPTII,S$GLB,, | Performed by: UROLOGY

## 2019-07-23 PROCEDURE — 51798 US URINE CAPACITY MEASURE: CPT | Mod: S$GLB,,, | Performed by: UROLOGY

## 2019-07-23 PROCEDURE — 99213 OFFICE O/P EST LOW 20 MIN: CPT | Mod: S$GLB,,, | Performed by: UROLOGY

## 2019-07-23 PROCEDURE — 99213 PR OFFICE/OUTPT VISIT, EST, LEVL III, 20-29 MIN: ICD-10-PCS | Mod: S$GLB,,, | Performed by: UROLOGY

## 2019-07-23 PROCEDURE — 51798 POCT BLADDER SCAN: ICD-10-PCS | Mod: S$GLB,,, | Performed by: UROLOGY

## 2019-07-23 PROCEDURE — 99999 PR PBB SHADOW E&M-EST. PATIENT-LVL III: CPT | Mod: PBBFAC,,, | Performed by: UROLOGY

## 2019-07-23 PROCEDURE — 99999 PR PBB SHADOW E&M-EST. PATIENT-LVL III: ICD-10-PCS | Mod: PBBFAC,,, | Performed by: UROLOGY

## 2019-07-23 PROCEDURE — 1101F PT FALLS ASSESS-DOCD LE1/YR: CPT | Mod: CPTII,S$GLB,, | Performed by: UROLOGY

## 2019-07-23 NOTE — PROGRESS NOTES
[facility] Urology Progress Note    Nilton Sherwood is a 73 y.o. male who presents for prostate cancer follow up     When PSA 7.0, which was a rise from 4.2 in 2015 (with negative prostate biopsy though 2 cores atypia present in 2014), biopsy by Dr Rodriguez on 2/6/17 revealed 23.7g gland with nodule/mass seen on ultrasound at [facility] and targeted with extra biopsy cores, which were positive.  4 core positive isaiah 3+3=6, including at area of suspicion on ultrasound. 9% 2 cores LB, 30% 1 core L TZ, 26% 1 core LA   Despite his low grade pathology, he was recommended to undergo treatment given his history of elevated psa and fairly exophytic lesion on ultrasound increasing risk for extraprostatic extension  He preferred surgical management but in addition to poorly controlled DM and HTN found to have cardiomyopathy with EF 35% and 3 vessel occlusive CAD requireing CABG and subsequent defibrillator.   He went on active surveillance     4/20/18 - psa 6.3. LUTS well controlled on flomax. No new back or bone pain.  5/15/18 - 1 year confirmatory AS prostate biopsy - 7/17 cores G6. 28.3cc gland  PATH: LB lat 5% 2/2 +PNI, LB medial 7%, LM lat 5%, LM medial 5%, LA lat 5%, LTZ 25% (* LB nodule cores benign)  repeat psa 12/3/18 of 10.5 with very low free psa of 0.8. At that time, AUA SS 5/2 mostly satisfied (flomax to doxazosin 1 mg by dr ness 2/2 dizziness)  With psa rise >10.5 advised definitive therapy. Had fiducial markers and spaceoar placed on 2/7/19 and completed definitive radiotherapy alone to 7740 cGy ending April 2019. Treatment was well-tolerated with mild radiation cystitis. AUA SS 13/2 at last visit with Dr Garcia in May    He returns today noting  PSA 4/22/19 was 3.8, and most recently on 7/14/19 was 0.98  Tolerated radiation well. Mild increase in LUTS but he feels like he has returned to his pre-radiation control of LUTS  Cardiac status stable, moved to Q6 month visits and due to see cards in October  AUA SS:  "5/1, pleased (2: sleeping; 1: frequency, intermittency, straining)  Does cut off fluids 2 hours before bed. Thinks he may snore.  Not too bothered by urinating at night as used to be 3-4x.  PVR by bladder scan is 17cc  Has lost 10lbs which he attributes to family stress      ROS: A comprehensive 10 system review was performed and is negative except as noted above in HPI    PHYSICAL EXAM:    Vitals:    07/23/19 0937   BP: 109/65   Pulse: 64   Resp: 18     Body mass index is 21.06 kg/m². Weight: 61 kg (134 lb 7.7 oz) Height: 5' 7" (170.2 cm)       General: Alert, cooperative, no distress, appears stated age   Head: Normocephalic, without obvious abnormality, atraumatic   Eyes: PERRL, conjunctiva/corneas clear   Lungs: Respirations unlabored   Heart: Warm and well perfused   Abdomen: soft NT ND  Extremities: Extremities normal, atraumatic, no cyanosis or edema   Skin: Skin color, texture, turgor normal, no rashes or lesions   Psych: Appropriate   Neurologic: Non-focal       Recent Results (from the past 336 hour(s))   Prostate Specific Antigen, Diagnostic    Collection Time: 07/16/19  8:45 AM   Result Value Ref Range    PSA DIAGNOSTIC 0.98 0.00 - 4.00 ng/mL       ASSESSMENT   1. Prostate cancer  Prostate Specific Antigen, Diagnostic    Prostate Specific Antigen, Diagnostic   2. BPH with obstruction/lower urinary tract symptoms  POCT Bladder Scan       Plan    He is doing very well status post radiotherapy for intermediate risk Shazia 6 prostate cancer, high-volume clinical T2 a with PSA greater than 10.  He tolerated radiation without untoward side effects and his urinary symptoms are back to baseline.  His BPH/LUTS symptoms are well controlled with a low dose of Cardura which he was switched to by his cardiologist given issues with dizziness on Flomax and this has maintained him well with minimal obstructive lower urinary tract symptoms and he is emptying his bladder very well at this time with a postvoid residual of " 17 cc.  His PSA is responding nicely to the radiotherapy and we did discuss that he may not yet be at his izaiah as it may take a little bit longer without the use of concurrent ADT so will continue to trend his PSA every 3 months.  As he is stable at this time and doing well I will have him get a PSA in 3 months and at 6 months and return at that time to see our nurse practitioner for an interim follow-up on his obstructive lower urinary tract symptoms with a repeat symptom score assessment and postvoid residual to ensure he continues to empty his bladder.  Will view PSA trend at that time to make sure to still trending the right direction, and as long as he is stable and doing well PSA should continue to be ordered every 3 months and I will see him 6 months later, 1 year from now.

## 2019-09-19 ENCOUNTER — OFFICE VISIT (OUTPATIENT)
Dept: SURGERY | Facility: CLINIC | Age: 74
End: 2019-09-19
Payer: MEDICARE

## 2019-09-19 VITALS
RESPIRATION RATE: 16 BRPM | OXYGEN SATURATION: 98 % | SYSTOLIC BLOOD PRESSURE: 124 MMHG | BODY MASS INDEX: 21.63 KG/M2 | HEART RATE: 79 BPM | TEMPERATURE: 98 F | WEIGHT: 137.81 LBS | HEIGHT: 67 IN | DIASTOLIC BLOOD PRESSURE: 78 MMHG

## 2019-09-19 DIAGNOSIS — D62 ACUTE BLOOD LOSS ANEMIA: ICD-10-CM

## 2019-09-19 DIAGNOSIS — Z12.11 ENCOUNTER FOR SCREENING COLONOSCOPY: Primary | ICD-10-CM

## 2019-09-19 PROCEDURE — 1101F PT FALLS ASSESS-DOCD LE1/YR: CPT | Mod: CPTII,S$GLB,, | Performed by: SURGERY

## 2019-09-19 PROCEDURE — 99204 PR OFFICE/OUTPT VISIT, NEW, LEVL IV, 45-59 MIN: ICD-10-PCS | Mod: S$GLB,,, | Performed by: SURGERY

## 2019-09-19 PROCEDURE — 1101F PR PT FALLS ASSESS DOC 0-1 FALLS W/OUT INJ PAST YR: ICD-10-PCS | Mod: CPTII,S$GLB,, | Performed by: SURGERY

## 2019-09-19 PROCEDURE — 99204 OFFICE O/P NEW MOD 45 MIN: CPT | Mod: S$GLB,,, | Performed by: SURGERY

## 2019-09-19 RX ORDER — LIDOCAINE HYDROCHLORIDE 10 MG/ML
1 INJECTION, SOLUTION EPIDURAL; INFILTRATION; INTRACAUDAL; PERINEURAL ONCE
Status: DISCONTINUED | OUTPATIENT
Start: 2019-09-19 | End: 2019-10-08 | Stop reason: HOSPADM

## 2019-09-19 RX ORDER — SODIUM CHLORIDE 9 MG/ML
INJECTION, SOLUTION INTRAVENOUS CONTINUOUS
Status: CANCELLED | OUTPATIENT
Start: 2019-09-19

## 2019-09-19 NOTE — H&P (VIEW-ONLY)
[facility] H&P Note    Subjective:       Patient ID: Nilton Sherwood is a 74 y.o. male.    Chief Complaint: Consult    HPI:  Nilton Sherwood is a 74 y.o. male with a history of prostate cancer status post radiation, coronary artery disease status post stents and CABG, hyperlipidemia, diabetes, tobacco use presents today as a new patient referral from Shannan Yoon NP for evaluation of anemia and in need of screening colonoscopy.  Patient states that he had a colonoscopy approximately 10 years prior.  This was negative.  Recent laboratory studies have suggested and anemia.  These were done routine.  No blood in the stool.  He does have a family history of colon cancer however an a distant relative niece.  No changes in bowel habits or significant unexplained weight loss.  Patient now presents today as a new patient referral for evaluation of this new diagnosis of anemia and in need of screening colonoscopy.    Past Medical History:   Diagnosis Date    Anemia 7/31/2017    B12    Cancer Feb 2017    Prostate    Cardiomyopathy     Cataract     Colon polyp     Coronary artery disease involving native coronary artery of native heart with angina pectoris 5/18/2017    Diabetes mellitus, type 2     Elevated glucose     Elevated PSA     Hyperlipidemia     Presence of stent in coronary artery 11/15/2017    Prostate cancer     Substance abuse     Tobacco abuse     Wears partial dentures     lowers     Past Surgical History:   Procedure Laterality Date    CARDIAC CATHETERIZATION  11/15/2017    LANNY to pCirc and Left main    CARDIAC DEFIBRILLATOR PLACEMENT Left 03/21/2018    CATARACT EXTRACTION Right     COLONOSCOPY      6 years ago    CORONARY ARTERY BYPASS GRAFT  05/02/2017    CABG x 3    CORONARY ARTERY BYPASS GRAFT (CABG)  05/02/2017    triple bypass    EYE SURGERY      right cataract removal    PROSTATE BIOPSY  Jan 2014    Negative    PROSTATE SURGERY      Biopsy     TRANSPERINEAL INSERTION OF PERIPROSTATIC GEL N/A 2/7/2019    [facility]    TRANSRECTAL ULTRASOUND GUIDED PROSTATE BIOPSY N/A 5/15/2018    [facility]    TRANSRECTAL ULTRASOUND GUIDED PROSTATE BIOPSY N/A 2/6/2017    [facility]    ULTRASOUND, PROSTATE, Perineal APPROACH, WITH GOLD FIDUCIAL MARKER INSERTION N/A 2/7/2019    [facility] OR     Family History   Problem Relation Age of Onset    Heart disease Mother     Heart failure Mother     Heart disease Father     Heart disease Brother     Cancer Sister     Lung cancer Sister     Alzheimer's disease Maternal Aunt      Social History     Socioeconomic History    Marital status: Single     Spouse name: Not on file    Number of children: Not on file    Years of education: Not on file    Highest education level: Not on file   Occupational History    Occupation: Retired   Social Needs    Financial resource strain: Not on file    Food insecurity:     Worry: Not on file     Inability: Not on file    Transportation needs:     Medical: Not on file     Non-medical: Not on file   Tobacco Use    Smoking status: Light Tobacco Smoker     Packs/day: 0.25     Years: 20.00     Pack years: 5.00     Types: Cigarettes    Smokeless tobacco: Never Used    Tobacco comment: Smokes 3 cig a day   Substance and Sexual Activity    Alcohol use: No    Drug use: No    Sexual activity: Not Currently   Lifestyle    Physical activity:     Days per week: Not on file     Minutes per session: Not on file    Stress: Not at all   Relationships    Social connections:     Talks on phone: Not on file     Gets together: Not on file     Attends [employer] service: Not on file     Active member of club or organization: Not on file     Attends meetings of clubs or organizations: Not on file     Relationship status: Not on file   Other Topics Concern     Not on file   Social History Narrative    Denies social or economic concerns        Current Outpatient Medications   Medication Sig Dispense Refill    aspirin (ECOTRIN) 81 MG EC tablet Take 81 mg by mouth once daily.      atorvastatin (LIPITOR) 40 MG tablet Take 40 mg by mouth once daily.      carvedilol (COREG) 6.25 MG tablet Take 1 tablet (6.25 mg total) by mouth 2 (two) times daily. 180 tablet 1    clopidogrel (PLAVIX) 75 mg tablet Take 75 mg by mouth once daily.      cyanocobalamin, vitamin B-12, 1,000 mcg TbSR Take 1,000 mcg by mouth once daily.  0    doxazosin (CARDURA) 1 MG tablet Take 1 mg by mouth every evening.      lisinopril (PRINIVIL,ZESTRIL) 2.5 MG tablet Take 1 tablet (2.5 mg total) by mouth once daily. 90 tablet 1    metFORMIN (GLUCOPHAGE) 1000 MG tablet Take 1 tablet (1,000 mg total) by mouth 2 (two) times daily with meals. 180 tablet 3     Current Facility-Administered Medications   Medication Dose Route Frequency Provider Last Rate Last Dose    lidocaine (PF) 10 mg/ml (1%) injection 10 mg  1 mL Intradermal Once Jerad Jackson MD         Facility-Administered Medications Ordered in Other Visits   Medication Dose Route Frequency Provider Last Rate Last Dose    lidocaine HCL 10 mg/ml (1%) injection 1 mL  1 mL Other Once Narinder Her MD         Review of patient's allergies indicates:  No Known Allergies    Review of Systems   Constitutional: Negative for appetite change, chills and fever.   HENT: Negative for congestion, dental problem and drooling.    Eyes: Negative for photophobia, discharge and itching.   Respiratory: Negative for apnea and chest tightness.    Cardiovascular: Negative for chest pain, palpitations and leg swelling.   Gastrointestinal: Negative for abdominal distention and abdominal pain.   Endocrine: Negative for cold intolerance and heat intolerance.   Genitourinary: Negative for difficulty urinating and dysuria.   Musculoskeletal: Negative for  "arthralgias and back pain.   Skin: Negative for color change and pallor.   Neurological: Negative for dizziness, facial asymmetry and headaches.   Hematological: Negative for adenopathy. Does not bruise/bleed easily.   Psychiatric/Behavioral: Negative for agitation, behavioral problems and confusion.       Objective:      Vitals:    09/19/19 0922   BP: 124/78   BP Location: Left arm   Patient Position: Sitting   BP Method: Medium (Automatic)   Pulse: 79   Resp: 16   Temp: 97.9 °F (36.6 °C)   TempSrc: Oral   SpO2: 98%   Weight: 62.5 kg (137 lb 12.8 oz)   Height: 5' 7" (1.702 m)     Physical Exam   Constitutional: He is oriented to person, place, and time. He appears well-developed and well-nourished.   HENT:   Head: Normocephalic and atraumatic.   Eyes: Pupils are equal, round, and reactive to light. EOM are normal.   Neck: Normal range of motion. Neck supple. No thyromegaly present.   Cardiovascular: Normal rate and regular rhythm.   No murmur heard.  Pulmonary/Chest: Effort normal and breath sounds normal. No respiratory distress.   Abdominal: Soft. Bowel sounds are normal. He exhibits no distension. There is no tenderness.   Musculoskeletal: Normal range of motion. He exhibits no edema.   Neurological: He is alert and oriented to person, place, and time. No cranial nerve deficit.   Skin: Skin is warm. Capillary refill takes less than 2 seconds. No rash noted. He is not diaphoretic. No erythema.   Psychiatric: He has a normal mood and affect.     Lab Results   Component Value Date    WBC 8.8 09/05/2019    HGB 12.4 (L) 09/05/2019    HCT 38.3 (L) 09/05/2019    MCV 92.3 09/05/2019     09/05/2019        Assessment:       1. Encounter for screening colonoscopy    2. Acute blood loss anemia        Plan:   Encounter for screening colonoscopy  -     Case Request Operating Room: COLONOSCOPY, ESOPHAGOGASTRODUODENOSCOPY (EGD)  -     Basic metabolic panel; Future; Expected date: 09/19/2019  -     CBC auto differential; " Future; Expected date: 09/19/2019  -     EKG 12-lead; Future    Acute blood loss anemia  -     Case Request Operating Room: COLONOSCOPY, ESOPHAGOGASTRODUODENOSCOPY (EGD)  -     Basic metabolic panel; Future; Expected date: 09/19/2019  -     CBC auto differential; Future; Expected date: 09/19/2019  -     EKG 12-lead; Future    Other orders  -     Insert peripheral IV; Standing  -     lidocaine (PF) 10 mg/ml (1%) injection 10 mg  -     Full code; Standing        Medical Decision Making/Counseling:  Patient with anemia of undetermined origin.  Last colonoscopy nearly 10 years ago.  No findings per the patient's account.  Patient does have a remote family history of colon cancer.  Patient does take anticoagulants.  Given the new anemia as well as no recent history of colonoscopy, the patient is in need of EGD and colonoscopy for further delineation.  The risk and benefits of EGD and colonoscopy were discussed in detail in clinic today.  Patient voiced understanding of the risks and benefits of EGD and colonoscopy with to proceed near future.    Will have patient discontinue Plavix for 7 days prior to colonoscopy and pending colonoscopy findings and procedures, possibly 7 days after colonoscopy.  Will insure by his cardiologist that this is safe and that he gets cleared by his cardiologist to do so.    Will obtain preoperative lab test to include CBC, CMP and EKG for review by the Anesthesiologist the day of the procedure prior to induction of the anesthetic agent of choice.    Risk and benefits of EGD were discussed in clinic in depth.  Risk of EGD were discussed to include bleeding as well as perforation.  Patient understands that if the above procedural risks were to occur, he could need further intervention to include but not exclude a blood transfusion, repeat procedure, admission to the hospital, or even surgery which would likely require transfer to a higher level of care.     Risks and benefits of Colonoscopy  were discussed in depth in clinic as well.  From a procedural standpoint, we discuss the benefits of colonoscopy to be finding colon cancers at early stages, including polyps which can be endoscopically resectable, to finding early stage colon cancers which can be better treated with current medical and surgical therapies in order to give patients a longer survival, if found in these early stages.  From a standpoint of risks, the risk of bleeding and perforation of the colon were discussed.  I personally discussed that if complications of bleeding or perforation were to occur, the patient could need as little as a blood transfusion and as much as possible hospital admission, repeat procedure, or even surgery.  During today's discussion of the procedure of colonoscopy with the patient, I personally cecilia the patient a picture to assist with counseling.  Total clinic time spent today 45 minutes face-to-face, with greater than half of the time spent in face to face counseling.      Patient instructed that best way to communicate with my office staff is for patient to get on the [facility] [employer] patient portal to expedite communication and communication issues that may occur.  Patient was given instructions on how to get on the portal.  I encouraged patient to obtain portal access as well.  Ultimately it is up to the patient to obtain access.  Patient voiced understanding.

## 2019-09-19 NOTE — LETTER
September 19, 2019      Alicia Yoon, ALLY-C  [address]  [place]  [place] [address]           [facility]  [address] [facility] [place] [address]  Phone: [phone]  Fax: [phone]          Patient: Nilton Sherwood   MR Number: [id number]   YOB: 1945   Date of Visit: 9/19/2019       Dear Alicia Yoon:    Thank you for referring Nilton Sherwood to me for evaluation. Attached you will find relevant portions of my assessment and plan of care.    If you have questions, please do not hesitate to call me. I look forward to following Nilton Sherwood along with you.    Sincerely,    Jerad Jackson MD    Enclosure  CC:  No Recipients    If you would like to receive this communication electronically, please contact [email or web address] or [phone] to request more information on [employer] access.    For providers and/or their staff who would like to refer a patient to [facility], please contact us through our one-stop-shop provider referral line, [facility] , at [phone].    If you feel you have received this communication in error or would no longer like to receive these types of communications, please e-mail [email or web address]

## 2019-09-19 NOTE — PATIENT INSTRUCTIONS

## 2019-09-19 NOTE — H&P
[facility] H&P Note    Subjective:       Patient ID: Nilton Sherwood is a 74 y.o. male.    Chief Complaint: Consult    HPI:  Nilton Sherwood is a 74 y.o. male with a history of prostate cancer status post radiation, coronary artery disease status post stents and CABG, hyperlipidemia, diabetes, tobacco use presents today as a new patient referral from Shannan Yoon NP for evaluation of anemia and in need of screening colonoscopy.  Patient states that he had a colonoscopy approximately 10 years prior.  This was negative.  Recent laboratory studies have suggested and anemia.  These were done routine.  No blood in the stool.  He does have a family history of colon cancer however an a distant relative niece.  No changes in bowel habits or significant unexplained weight loss.  Patient now presents today as a new patient referral for evaluation of this new diagnosis of anemia and in need of screening colonoscopy.    Past Medical History:   Diagnosis Date    Anemia 7/31/2017    B12    Cancer Feb 2017    Prostate    Cardiomyopathy     Cataract     Colon polyp     Coronary artery disease involving native coronary artery of native heart with angina pectoris 5/18/2017    Diabetes mellitus, type 2     Elevated glucose     Elevated PSA     Hyperlipidemia     Presence of stent in coronary artery 11/15/2017    Prostate cancer     Substance abuse     Tobacco abuse     Wears partial dentures     lowers     Past Surgical History:   Procedure Laterality Date    CARDIAC CATHETERIZATION  11/15/2017    LANNY to pCirc and Left main    CARDIAC DEFIBRILLATOR PLACEMENT Left 03/21/2018    CATARACT EXTRACTION Right     COLONOSCOPY      6 years ago    CORONARY ARTERY BYPASS GRAFT  05/02/2017    CABG x 3    CORONARY ARTERY BYPASS GRAFT (CABG)  05/02/2017    triple bypass    EYE SURGERY      right cataract removal    PROSTATE BIOPSY  Jan 2014    Negative    PROSTATE SURGERY      Biopsy     TRANSPERINEAL INSERTION OF PERIPROSTATIC GEL N/A 2/7/2019    [facility] at [facility]    TRANSRECTAL ULTRASOUND GUIDED PROSTATE BIOPSY N/A 5/15/2018    [facility] at [facility]    TRANSRECTAL ULTRASOUND GUIDED PROSTATE BIOPSY N/A 2/6/2017    [facility] by Aleta Rodriguez [facility] at [facility]    ULTRASOUND, PROSTATE, Perineal APPROACH, WITH GOLD FIDUCIAL MARKER INSERTION N/A 2/7/2019    [facility] at [facility] OR     Family History   Problem Relation Age of Onset    Heart disease Mother     Heart failure Mother     Heart disease Father     Heart disease Brother     Cancer Sister     Lung cancer Sister     Alzheimer's disease Maternal Aunt      Social History     Socioeconomic History    Marital status: Single     Spouse name: Not on file    Number of children: Not on file    Years of education: Not on file    Highest education level: Not on file   Occupational History    Occupation: Retired   Social Needs    Financial resource strain: Not on file    Food insecurity:     Worry: Not on file     Inability: Not on file    Transportation needs:     Medical: Not on file     Non-medical: Not on file   Tobacco Use    Smoking status: Light Tobacco Smoker     Packs/day: 0.25     Years: 20.00     Pack years: 5.00     Types: Cigarettes    Smokeless tobacco: Never Used    Tobacco comment: Smokes 3 cig a day   Substance and Sexual Activity    Alcohol use: No    Drug use: No    Sexual activity: Not Currently   Lifestyle    Physical activity:     Days per week: Not on file     Minutes per session: Not on file    Stress: Not at all   Relationships    Social connections:     Talks on phone: Not on file     Gets together: Not on file     Attends [employer] service: Not on file     Active member of club or organization: Not on file     Attends meetings of clubs or organizations: Not on file     Relationship status: Not on file   Other Topics Concern     Not on file   Social History Narrative    Denies social or economic concerns        Current Outpatient Medications   Medication Sig Dispense Refill    aspirin (ECOTRIN) 81 MG EC tablet Take 81 mg by mouth once daily.      atorvastatin (LIPITOR) 40 MG tablet Take 40 mg by mouth once daily.      carvedilol (COREG) 6.25 MG tablet Take 1 tablet (6.25 mg total) by mouth 2 (two) times daily. 180 tablet 1    clopidogrel (PLAVIX) 75 mg tablet Take 75 mg by mouth once daily.      cyanocobalamin, vitamin B-12, 1,000 mcg TbSR Take 1,000 mcg by mouth once daily.  0    doxazosin (CARDURA) 1 MG tablet Take 1 mg by mouth every evening.      lisinopril (PRINIVIL,ZESTRIL) 2.5 MG tablet Take 1 tablet (2.5 mg total) by mouth once daily. 90 tablet 1    metFORMIN (GLUCOPHAGE) 1000 MG tablet Take 1 tablet (1,000 mg total) by mouth 2 (two) times daily with meals. 180 tablet 3     Current Facility-Administered Medications   Medication Dose Route Frequency Provider Last Rate Last Dose    lidocaine (PF) 10 mg/ml (1%) injection 10 mg  1 mL Intradermal Once Jerad Jackson MD         Facility-Administered Medications Ordered in Other Visits   Medication Dose Route Frequency Provider Last Rate Last Dose    lidocaine HCL 10 mg/ml (1%) injection 1 mL  1 mL Other Once Narinder Her MD         Review of patient's allergies indicates:  No Known Allergies    Review of Systems   Constitutional: Negative for appetite change, chills and fever.   HENT: Negative for congestion, dental problem and drooling.    Eyes: Negative for photophobia, discharge and itching.   Respiratory: Negative for apnea and chest tightness.    Cardiovascular: Negative for chest pain, palpitations and leg swelling.   Gastrointestinal: Negative for abdominal distention and abdominal pain.   Endocrine: Negative for cold intolerance and heat intolerance.   Genitourinary: Negative for difficulty urinating and dysuria.   Musculoskeletal: Negative for  "arthralgias and back pain.   Skin: Negative for color change and pallor.   Neurological: Negative for dizziness, facial asymmetry and headaches.   Hematological: Negative for adenopathy. Does not bruise/bleed easily.   Psychiatric/Behavioral: Negative for agitation, behavioral problems and confusion.       Objective:      Vitals:    09/19/19 0922   BP: 124/78   BP Location: Left arm   Patient Position: Sitting   BP Method: Medium (Automatic)   Pulse: 79   Resp: 16   Temp: 97.9 °F (36.6 °C)   TempSrc: Oral   SpO2: 98%   Weight: 62.5 kg (137 lb 12.8 oz)   Height: 5' 7" (1.702 m)     Physical Exam   Constitutional: He is oriented to person, place, and time. He appears well-developed and well-nourished.   HENT:   Head: Normocephalic and atraumatic.   Eyes: Pupils are equal, round, and reactive to light. EOM are normal.   Neck: Normal range of motion. Neck supple. No thyromegaly present.   Cardiovascular: Normal rate and regular rhythm.   No murmur heard.  Pulmonary/Chest: Effort normal and breath sounds normal. No respiratory distress.   Abdominal: Soft. Bowel sounds are normal. He exhibits no distension. There is no tenderness.   Musculoskeletal: Normal range of motion. He exhibits no edema.   Neurological: He is alert and oriented to person, place, and time. No cranial nerve deficit.   Skin: Skin is warm. Capillary refill takes less than 2 seconds. No rash noted. He is not diaphoretic. No erythema.   Psychiatric: He has a normal mood and affect.     Lab Results   Component Value Date    WBC 8.8 09/05/2019    HGB 12.4 (L) 09/05/2019    HCT 38.3 (L) 09/05/2019    MCV 92.3 09/05/2019     09/05/2019        Assessment:       1. Encounter for screening colonoscopy    2. Acute blood loss anemia        Plan:   Encounter for screening colonoscopy  -     Case Request Operating Room: COLONOSCOPY, ESOPHAGOGASTRODUODENOSCOPY (EGD)  -     Basic metabolic panel; Future; Expected date: 09/19/2019  -     CBC auto differential; " Future; Expected date: 09/19/2019  -     EKG 12-lead; Future    Acute blood loss anemia  -     Case Request Operating Room: COLONOSCOPY, ESOPHAGOGASTRODUODENOSCOPY (EGD)  -     Basic metabolic panel; Future; Expected date: 09/19/2019  -     CBC auto differential; Future; Expected date: 09/19/2019  -     EKG 12-lead; Future    Other orders  -     Insert peripheral IV; Standing  -     lidocaine (PF) 10 mg/ml (1%) injection 10 mg  -     Full code; Standing        Medical Decision Making/Counseling:  Patient with anemia of undetermined origin.  Last colonoscopy nearly 10 years ago.  No findings per the patient's account.  Patient does have a remote family history of colon cancer.  Patient does take anticoagulants.  Given the new anemia as well as no recent history of colonoscopy, the patient is in need of EGD and colonoscopy for further delineation.  The risk and benefits of EGD and colonoscopy were discussed in detail in clinic today.  Patient voiced understanding of the risks and benefits of EGD and colonoscopy with to proceed near future.    Will have patient discontinue Plavix for 7 days prior to colonoscopy and pending colonoscopy findings and procedures, possibly 7 days after colonoscopy.  Will insure by his cardiologist that this is safe and that he gets cleared by his cardiologist to do so.    Will obtain preoperative lab test to include CBC, CMP and EKG for review by the Anesthesiologist the day of the procedure prior to induction of the anesthetic agent of choice.    Risk and benefits of EGD were discussed in clinic in depth.  Risk of EGD were discussed to include bleeding as well as perforation.  Patient understands that if the above procedural risks were to occur, he could need further intervention to include but not exclude a blood transfusion, repeat procedure, admission to the hospital, or even surgery which would likely require transfer to a higher level of care.     Risks and benefits of Colonoscopy  were discussed in depth in clinic as well.  From a procedural standpoint, we discuss the benefits of colonoscopy to be finding colon cancers at early stages, including polyps which can be endoscopically resectable, to finding early stage colon cancers which can be better treated with current medical and surgical therapies in order to give patients a longer survival, if found in these early stages.  From a standpoint of risks, the risk of bleeding and perforation of the colon were discussed.  I personally discussed that if complications of bleeding or perforation were to occur, the patient could need as little as a blood transfusion and as much as possible hospital admission, repeat procedure, or even surgery.  During today's discussion of the procedure of colonoscopy with the patient, I personally cecilia the patient a picture to assist with counseling.  Total clinic time spent today 45 minutes face-to-face, with greater than half of the time spent in face to face counseling.      Patient instructed that best way to communicate with my office staff is for patient to get on the [employer] [facility] patient portal to expedite communication and communication issues that may occur.  Patient was given instructions on how to get on the portal.  I encouraged patient to obtain portal access as well.  Ultimately it is up to the patient to obtain access.  Patient voiced understanding.

## 2019-10-03 ENCOUNTER — HOSPITAL ENCOUNTER (OUTPATIENT)
Dept: CARDIOLOGY | Facility: HOSPITAL | Age: 74
Discharge: HOME | End: 2019-10-03
Attending: SURGERY
Payer: MEDICARE

## 2019-10-03 DIAGNOSIS — D62 ACUTE BLOOD LOSS ANEMIA: ICD-10-CM

## 2019-10-03 DIAGNOSIS — Z12.11 ENCOUNTER FOR SCREENING COLONOSCOPY: ICD-10-CM

## 2019-10-03 PROCEDURE — 93005 ELECTROCARDIOGRAM TRACING: CPT

## 2019-10-08 ENCOUNTER — ANESTHESIA (OUTPATIENT)
Dept: SURGERY | Facility: HOSPITAL | Age: 74
End: 2019-10-08
Payer: MEDICARE

## 2019-10-08 ENCOUNTER — HOSPITAL ENCOUNTER (OUTPATIENT)
Facility: HOSPITAL | Age: 74
Discharge: HOME | End: 2019-10-08
Attending: SURGERY | Admitting: SURGERY
Payer: MEDICARE

## 2019-10-08 ENCOUNTER — ANESTHESIA EVENT (OUTPATIENT)
Dept: SURGERY | Facility: HOSPITAL | Age: 74
End: 2019-10-08
Payer: MEDICARE

## 2019-10-08 VITALS
BODY MASS INDEX: 21.5 KG/M2 | DIASTOLIC BLOOD PRESSURE: 83 MMHG | TEMPERATURE: 98 F | RESPIRATION RATE: 17 BRPM | HEART RATE: 60 BPM | HEIGHT: 67 IN | SYSTOLIC BLOOD PRESSURE: 130 MMHG | OXYGEN SATURATION: 100 % | WEIGHT: 137 LBS

## 2019-10-08 DIAGNOSIS — Z12.11 ENCOUNTER FOR SCREENING COLONOSCOPY: ICD-10-CM

## 2019-10-08 LAB
POCT GLUCOSE: 101 MG/DL (ref 70–110)
POCT GLUCOSE: 125 MG/DL (ref 70–110)

## 2019-10-08 PROCEDURE — 45388: ICD-10-PCS | Mod: PT,,, | Performed by: SURGERY

## 2019-10-08 PROCEDURE — 45384 COLONOSCOPY W/LESION REMOVAL: CPT | Mod: 59,,, | Performed by: SURGERY

## 2019-10-08 PROCEDURE — 88305 TISSUE EXAM BY PATHOLOGIST: CPT | Performed by: PATHOLOGY

## 2019-10-08 PROCEDURE — 27201012 HC FORCEPS, HOT/COLD, DISP: Performed by: SURGERY

## 2019-10-08 PROCEDURE — 88342 IMHCHEM/IMCYTCHM 1ST ANTB: CPT | Mod: 26,,, | Performed by: PATHOLOGY

## 2019-10-08 PROCEDURE — 88305 TISSUE SPECIMEN TO PATHOLOGY - SURGERY: ICD-10-PCS | Mod: 26,,, | Performed by: PATHOLOGY

## 2019-10-08 PROCEDURE — 88342 TISSUE SPECIMEN TO PATHOLOGY - SURGERY: ICD-10-PCS | Mod: 26,,, | Performed by: PATHOLOGY

## 2019-10-08 PROCEDURE — 45388 COLONOSCOPY W/ABLATION: CPT | Mod: PT,,, | Performed by: SURGERY

## 2019-10-08 PROCEDURE — D9220A PRA ANESTHESIA: ICD-10-PCS | Mod: CRNA,,, | Performed by: NURSE ANESTHETIST, CERTIFIED REGISTERED

## 2019-10-08 PROCEDURE — 45384 COLONOSCOPY W/LESION REMOVAL: CPT | Performed by: SURGERY

## 2019-10-08 PROCEDURE — 45388 COLONOSCOPY W/ABLATION: CPT | Performed by: SURGERY

## 2019-10-08 PROCEDURE — 37000009 HC ANESTHESIA EA ADD 15 MINS: Performed by: SURGERY

## 2019-10-08 PROCEDURE — 37000008 HC ANESTHESIA 1ST 15 MINUTES: Performed by: SURGERY

## 2019-10-08 PROCEDURE — 63600175 PHARM REV CODE 636 W HCPCS: Performed by: NURSE ANESTHETIST, CERTIFIED REGISTERED

## 2019-10-08 PROCEDURE — 88342 IMHCHEM/IMCYTCHM 1ST ANTB: CPT | Performed by: PATHOLOGY

## 2019-10-08 PROCEDURE — 45384 PR COLONOSCOPY,REMV LESN,FORCEP/CAUTERY: ICD-10-PCS | Mod: 59,,, | Performed by: SURGERY

## 2019-10-08 PROCEDURE — 88305 TISSUE EXAM BY PATHOLOGIST: CPT | Mod: 26,,, | Performed by: PATHOLOGY

## 2019-10-08 PROCEDURE — 43239 PR EGD, FLEX, W/BIOPSY, SGL/MULTI: ICD-10-PCS | Mod: 51,,, | Performed by: SURGERY

## 2019-10-08 PROCEDURE — D9220A PRA ANESTHESIA: ICD-10-PCS | Mod: ANES,,, | Performed by: ANESTHESIOLOGY

## 2019-10-08 PROCEDURE — 63600175 PHARM REV CODE 636 W HCPCS: Performed by: SURGERY

## 2019-10-08 PROCEDURE — D9220A PRA ANESTHESIA: Mod: CRNA,,, | Performed by: NURSE ANESTHETIST, CERTIFIED REGISTERED

## 2019-10-08 PROCEDURE — 43239 EGD BIOPSY SINGLE/MULTIPLE: CPT | Performed by: SURGERY

## 2019-10-08 PROCEDURE — D9220A PRA ANESTHESIA: Mod: ANES,,, | Performed by: ANESTHESIOLOGY

## 2019-10-08 PROCEDURE — 43239 EGD BIOPSY SINGLE/MULTIPLE: CPT | Mod: 51,,, | Performed by: SURGERY

## 2019-10-08 RX ORDER — PROPOFOL 10 MG/ML
VIAL (ML) INTRAVENOUS
Status: DISCONTINUED | OUTPATIENT
Start: 2019-10-08 | End: 2019-10-08

## 2019-10-08 RX ORDER — PANTOPRAZOLE SODIUM 40 MG/1
40 TABLET, DELAYED RELEASE ORAL DAILY
Qty: 30 TABLET | Refills: 6 | Status: SHIPPED | OUTPATIENT
Start: 2019-10-08 | End: 2020-04-13

## 2019-10-08 RX ORDER — SODIUM CHLORIDE 9 MG/ML
INJECTION, SOLUTION INTRAVENOUS CONTINUOUS
Status: DISCONTINUED | OUTPATIENT
Start: 2019-10-08 | End: 2019-10-08 | Stop reason: HOSPADM

## 2019-10-08 RX ORDER — FAMOTIDINE 10 MG/ML
INJECTION INTRAVENOUS
Status: DISCONTINUED
Start: 2019-10-08 | End: 2019-10-08 | Stop reason: HOSPADM

## 2019-10-08 RX ADMIN — PROPOFOL 20 MG: 10 INJECTION, EMULSION INTRAVENOUS at 09:10

## 2019-10-08 RX ADMIN — PROPOFOL 10 MG: 10 INJECTION, EMULSION INTRAVENOUS at 09:10

## 2019-10-08 RX ADMIN — SODIUM CHLORIDE: 0.9 INJECTION, SOLUTION INTRAVENOUS at 09:10

## 2019-10-08 NOTE — ANESTHESIA POSTPROCEDURE EVALUATION
Anesthesia Post Evaluation    Patient: Nilton Sherwood    Procedure(s) Performed: Procedure(s) (LRB):  COLONOSCOPY (N/A)  ESOPHAGOGASTRODUODENOSCOPY (EGD) (N/A)    Final Anesthesia Type: general  Patient location during evaluation: PACU  Patient participation: Yes- Able to Participate  Level of consciousness: awake and alert  Post-procedure vital signs: reviewed and stable  Pain management: adequate  Airway patency: patent  PONV status at discharge: No PONV  Anesthetic complications: no      Cardiovascular status: blood pressure returned to baseline  Respiratory status: unassisted  Hydration status: euvolemic  Follow-up not needed.          Vitals Value Taken Time   /83 10/8/2019 10:27 AM   Temp 36.6 °C (97.8 °F) 10/8/2019 10:00 AM   Pulse 60 10/8/2019 10:27 AM   Resp 10 10/8/2019 10:27 AM   SpO2 99 % 10/8/2019 10:27 AM   Vitals shown include unvalidated device data.      Event Time     Out of Recovery 10:28:00          Pain/Lonny Score: Lonny Score: 10 (10/8/2019 10:25 AM)

## 2019-10-08 NOTE — DISCHARGE SUMMARY
Discharge Note        SUMMARY     Admit Date: 10/8/2019    Attending Physician: Jerad Jackson MD     Discharge Physician: Jerad Jackson MD    Discharge Date: 10/8/2019 9:54 AM      Hospital Course: Patient tolerated procedure well.     Disposition: Home or Self Care    Patient Instructions:   Current Discharge Medication List      CONTINUE these medications which have NOT CHANGED    Details   atorvastatin (LIPITOR) 40 MG tablet Take 40 mg by mouth once daily.      carvedilol (COREG) 6.25 MG tablet Take 1 tablet (6.25 mg total) by mouth 2 (two) times daily.  Qty: 180 tablet, Refills: 1    Associated Diagnoses: Pure hypercholesterolemia; Cardiomyopathy, unspecified type      doxazosin (CARDURA) 1 MG tablet Take 1 mg by mouth every evening.      lisinopril (PRINIVIL,ZESTRIL) 2.5 MG tablet Take 1 tablet (2.5 mg total) by mouth once daily.  Qty: 90 tablet, Refills: 1    Associated Diagnoses: Pure hypercholesterolemia; Cardiomyopathy, unspecified type      metFORMIN (GLUCOPHAGE) 1000 MG tablet Take 1 tablet (1,000 mg total) by mouth 2 (two) times daily with meals.  Qty: 180 tablet, Refills: 3    Associated Diagnoses: Type 2 diabetes mellitus with hyperglycemia, without long-term current use of insulin; Hemoglobin A1c between 7.0% and 9.0%      aspirin (ECOTRIN) 81 MG EC tablet Take 81 mg by mouth once daily.      clopidogrel (PLAVIX) 75 mg tablet Take 75 mg by mouth once daily.      cyanocobalamin, vitamin B-12, 1,000 mcg TbSR Take 1,000 mcg by mouth once daily.  Refills: 0    Associated Diagnoses: Vitamin B12 deficiency             Discharge Procedure Orders (must include Diet, Follow-up, Activity):   Discharge Procedure Orders (must include Diet, Follow-up, Activity)   Diet general     Call MD for:  temperature >100.4     Call MD for:  persistent nausea and vomiting     Call MD for:  severe uncontrolled pain     Call MD for:  difficulty breathing, headache or visual disturbances     Call MD for:   redness, tenderness, or signs of infection (pain, swelling, redness, odor or green/yellow discharge around incision site)     Call MD for:  persistent dizziness or light-headedness        Follow Up:  Follow up as scheduled.  Resume routine diet.  Activity as tolerated.    No driving day of procedure.

## 2019-10-08 NOTE — ANESTHESIA PREPROCEDURE EVALUATION
10/08/2019  Nilton Sherwood is a 74 y.o., male.    Anesthesia Evaluation    I have reviewed the Patient Summary Reports.    I have reviewed the Nursing Notes.   I have reviewed the Medications.     Review of Systems  Anesthesia Hx:  No problems with previous Anesthesia    Social:  Smoker    Hematology/Oncology:  Hematology Normal   Oncology Normal    -- Coag Disorders: Bleeding Disorder: currently taking: clopidogrel    EENT/Dental:EENT/Dental Normal   Cardiovascular:   CAD  CABG/stent Dysrhythmias (Pacemaker)  Angina  Disorder of Cardiac Rhythm (AICD)    Pulmonary:  Pulmonary Normal    Musculoskeletal:  Musculoskeletal Normal    Neurological:  Neurology Normal    Endocrine:   Diabetes    Dermatological:  Skin Normal    Psych:  Psychiatric Normal           Physical Exam  General:  Well nourished    Airway/Jaw/Neck:  Airway Findings: Mouth Opening: Normal Tongue: Normal  General Airway Assessment: Adult  Mallampati: III  TM Distance: 4 - 6 cm       Chest/Lungs:  Chest/Lungs Findings: Clear to auscultation     Heart/Vascular:  Heart Findings: Rate: Normal  Rhythm: Regular Rhythm        Mental Status:  Mental Status Findings:  Cooperative, Alert and Oriented         Anesthesia Plan  Type of Anesthesia, risks & benefits discussed:  Anesthesia Type:  general  Patient's Preference:   Intra-op Monitoring Plan: standard ASA monitors  Intra-op Monitoring Plan Comments:   Post Op Pain Control Plan: IV/PO Opioids PRN  Post Op Pain Control Plan Comments:   Induction:   IV  Beta Blocker:  Patient is not currently on a Beta-Blocker (No further documentation required).       Informed Consent: Patient understands risks and agrees with Anesthesia plan.  Questions answered. Anesthesia consent signed with patient.  ASA Score: 4     Day of Surgery Review of History & Physical: I have interviewed and examined the patient. I  have reviewed the patient's H&P dated:            Ready For Surgery From Anesthesia Perspective.

## 2019-10-08 NOTE — TRANSFER OF CARE
"Anesthesia Transfer of Care Note    Patient: Nilton Sherwood    Procedure(s) Performed: Procedure(s) (LRB):  COLONOSCOPY (N/A)  ESOPHAGOGASTRODUODENOSCOPY (EGD) (N/A)    Patient location: PACU    Anesthesia Type: general    Transport from OR: Transported from OR on room air with adequate spontaneous ventilation    Post pain: adequate analgesia    Post assessment: no apparent anesthetic complications and tolerated procedure well    Post vital signs: stable    Level of consciousness: awake, alert and oriented    Nausea/Vomiting: no nausea/vomiting    Complications: none    Transfer of care protocol was followed      Last vitals:   Visit Vitals  /69 (BP Location: Right arm, Patient Position: Lying)   Pulse 64   Temp 36.6 °C (97.9 °F) (Oral)   Resp 17   Ht 5' 7" (1.702 m)   Wt 62.1 kg (137 lb)   SpO2 98%   BMI 21.46 kg/m²     "

## 2019-10-08 NOTE — [employer] PATIENT INSTRUCTIONS
Discharge Summary/Instructions after an Endoscopic Procedure  Patient Name: Nilton Sherwood  Patient MRN: [id number]  Patient YOB: 1945 Tuesday, October 08, 2019  Jerad Jackson MD  RESTRICTIONS:  During your procedure today, you received medications for sedation.  These   medications may affect your judgment, balance and coordination.  Therefore,   for 24 hours, you have the following restrictions:   - DO NOT drive a car, operate machinery, make legal/financial decisions,   sign important papers or drink alcohol.    ACTIVITY:  Today: no heavy lifting, straining or running due to procedural   sedation/anesthesia.  The following day: return to full activity including work.  DIET:  Eat and drink normally unless instructed otherwise.     TREATMENT FOR COMMON SIDE EFFECTS:  - Mild abdominal pain, nausea, belching, bloating or excessive gas:  rest,   eat lightly and use a heating pad.  - Sore Throat: treat with throat lozenges and/or gargle with warm salt   water.  - Because air was used during the procedure, expelling large amounts of air   from your rectum or belching is normal.  - If a bowel prep was taken, you may not have a bowel movement for 1-3 days.    This is normal.  SYMPTOMS TO WATCH FOR AND REPORT TO YOUR PHYSICIAN:  1. Abdominal pain or bloating, other than gas cramps.  2. Chest pain.  3. Back pain.  4. Signs of infection such as: chills or fever occurring within 24 hours   after the procedure.  5. Rectal bleeding, which would show as bright red, maroon, or black stools.   (A tablespoon of blood from the rectum is not serious, especially if   hemorrhoids are present.)  6. Vomiting.  7. Weakness or dizziness.  GO DIRECTLY TO THE NEAREST EMERGENCY ROOM IF YOU HAVE ANY OF THE FOLLOWING:      Difficulty breathing              Chills and/or fever over 101 F   Persistent vomiting and/or vomiting blood   Severe abdominal pain   Severe chest pain   Black, tarry stools   Bleeding- more than one  tablespoon   Any other symptom or condition that you feel may need urgent attention  Your doctor recommends these additional instructions:  If any biopsies were taken, your doctors clinic will contact you in 1 to 2   weeks with any results.  - Discharge patient to home (ambulatory).   - Resume previous diet.   - Continue present medications.   - Use Protonix (pantoprazole) 40 mg PO daily.   - Await pathology results.   - Return to my office in 2 weeks.  For questions, problems or results please call your physician - Jerad Jackson MD at Work:  [phone].  [facility] EMERGENCY ROOM PHONE NUMBER: [phone]  IF A COMPLICATION OR EMERGENCY SITUATION ARISES AND YOU ARE UNABLE TO REACH   YOUR PHYSICIAN - GO DIRECTLY TO THE EMERGENCY ROOM.  MD Jerad Perkins MD  10/8/2019 10:01:19 AM  This report has been verified and signed electronically.  [employer]

## 2019-10-08 NOTE — INTERVAL H&P NOTE
The patient has been examined and the H&P has been reviewed:    I concur with the findings and no changes have occurred since H&P was written.    Surgery risks, benefits and alternative options discussed and understood by patient/family.    Patient seen and examined.  Ready for EGD and Colonoscopy.  Risks and benefits of the procedure discussed, patient voiced understanding and wishes to proceed today by signing informed consent.  Plavix last taken 7 days prior.    Active Hospital Problems    Diagnosis  POA    Encounter for screening colonoscopy [Z12.11]  Not Applicable      Resolved Hospital Problems   No resolved problems to display.

## 2019-10-08 NOTE — [employer] PATIENT INSTRUCTIONS
Discharge Summary/Instructions after an Endoscopic Procedure  Patient Name: Nilton Sherwood  Patient MRN: [id number]  Patient YOB: 1945 Tuesday, October 08, 2019  Jerad Jackson MD  RESTRICTIONS:  During your procedure today, you received medications for sedation.  These   medications may affect your judgment, balance and coordination.  Therefore,   for 24 hours, you have the following restrictions:   - DO NOT drive a car, operate machinery, make legal/financial decisions,   sign important papers or drink alcohol.    ACTIVITY:  Today: no heavy lifting, straining or running due to procedural   sedation/anesthesia.  The following day: return to full activity including work.  DIET:  Eat and drink normally unless instructed otherwise.     TREATMENT FOR COMMON SIDE EFFECTS:  - Mild abdominal pain, nausea, belching, bloating or excessive gas:  rest,   eat lightly and use a heating pad.  - Sore Throat: treat with throat lozenges and/or gargle with warm salt   water.  - Because air was used during the procedure, expelling large amounts of air   from your rectum or belching is normal.  - If a bowel prep was taken, you may not have a bowel movement for 1-3 days.    This is normal.  SYMPTOMS TO WATCH FOR AND REPORT TO YOUR PHYSICIAN:  1. Abdominal pain or bloating, other than gas cramps.  2. Chest pain.  3. Back pain.  4. Signs of infection such as: chills or fever occurring within 24 hours   after the procedure.  5. Rectal bleeding, which would show as bright red, maroon, or black stools.   (A tablespoon of blood from the rectum is not serious, especially if   hemorrhoids are present.)  6. Vomiting.  7. Weakness or dizziness.  GO DIRECTLY TO THE NEAREST EMERGENCY ROOM IF YOU HAVE ANY OF THE FOLLOWING:      Difficulty breathing              Chills and/or fever over 101 F   Persistent vomiting and/or vomiting blood   Severe abdominal pain   Severe chest pain   Black, tarry stools   Bleeding- more than one  tablespoon   Any other symptom or condition that you feel may need urgent attention  Your doctor recommends these additional instructions:  If any biopsies were taken, your doctors clinic will contact you in 1 to 2   weeks with any results.  - Discharge patient to home (ambulatory).   - High fiber diet.   - Continue present medications.   - Await pathology results.   - Repeat colonoscopy in 3 years for surveillance.   - Return to my office in 2 weeks.  For questions, problems or results please call your physician - Jerad Jackson MD at Work:  [phone].  [facility] EMERGENCY ROOM PHONE NUMBER: [phone]  IF A COMPLICATION OR EMERGENCY SITUATION ARISES AND YOU ARE UNABLE TO REACH   YOUR PHYSICIAN - GO DIRECTLY TO THE EMERGENCY ROOM.  MD Jerad Perkins MD  10/8/2019 9:58:19 AM  This report has been verified and signed electronically.  [employer]

## 2019-10-21 ENCOUNTER — HOSPITAL ENCOUNTER (INPATIENT)
Facility: HOSPITAL | Age: 74
LOS: 4 days | Discharge: HOME | DRG: 378 | End: 2019-10-25
Attending: EMERGENCY MEDICINE | Admitting: INTERNAL MEDICINE
Payer: MEDICARE

## 2019-10-21 DIAGNOSIS — D62 ACUTE BLOOD LOSS ANEMIA: Primary | ICD-10-CM

## 2019-10-21 DIAGNOSIS — K92.2 ACUTE LOWER GASTROINTESTINAL HEMORRHAGE: ICD-10-CM

## 2019-10-21 LAB
ABO + RH BLD: NORMAL
ALBUMIN SERPL BCP-MCNC: 3.7 G/DL (ref 3.5–5.2)
ALP SERPL-CCNC: 61 U/L (ref 55–135)
ALT SERPL W/O P-5'-P-CCNC: 17 U/L (ref 10–44)
ANION GAP SERPL CALC-SCNC: 8 MMOL/L (ref 8–16)
AST SERPL-CCNC: 14 U/L (ref 10–40)
BASOPHILS # BLD AUTO: 0.05 K/UL (ref 0–0.2)
BASOPHILS # BLD AUTO: 0.06 K/UL (ref 0–0.2)
BASOPHILS NFR BLD: 0.6 % (ref 0–1.9)
BASOPHILS NFR BLD: 0.6 % (ref 0–1.9)
BILIRUB SERPL-MCNC: 0.9 MG/DL (ref 0.1–1)
BLD GP AB SCN CELLS X3 SERPL QL: NORMAL
BLD PROD TYP BPU: NORMAL
BLD PROD TYP BPU: NORMAL
BLOOD UNIT EXPIRATION DATE: NORMAL
BLOOD UNIT EXPIRATION DATE: NORMAL
BLOOD UNIT TYPE CODE: 6200
BLOOD UNIT TYPE CODE: 6200
BLOOD UNIT TYPE: NORMAL
BLOOD UNIT TYPE: NORMAL
BUN SERPL-MCNC: 15 MG/DL (ref 8–23)
CALCIUM SERPL-MCNC: 8.9 MG/DL (ref 8.7–10.5)
CHLORIDE SERPL-SCNC: 104 MMOL/L (ref 95–110)
CO2 SERPL-SCNC: 26 MMOL/L (ref 23–29)
CODING SYSTEM: NORMAL
CODING SYSTEM: NORMAL
CREAT SERPL-MCNC: 0.8 MG/DL (ref 0.5–1.4)
DIFFERENTIAL METHOD: ABNORMAL
DIFFERENTIAL METHOD: ABNORMAL
DISPENSE STATUS: NORMAL
DISPENSE STATUS: NORMAL
EOSINOPHIL # BLD AUTO: 1 K/UL (ref 0–0.5)
EOSINOPHIL # BLD AUTO: 1.2 K/UL (ref 0–0.5)
EOSINOPHIL NFR BLD: 11.8 % (ref 0–8)
EOSINOPHIL NFR BLD: 12 % (ref 0–8)
ERYTHROCYTE [DISTWIDTH] IN BLOOD BY AUTOMATED COUNT: 13.7 % (ref 11.5–14.5)
ERYTHROCYTE [DISTWIDTH] IN BLOOD BY AUTOMATED COUNT: 13.7 % (ref 11.5–14.5)
EST. GFR  (AFRICAN AMERICAN): >60 ML/MIN/1.73 M^2
EST. GFR  (NON AFRICAN AMERICAN): >60 ML/MIN/1.73 M^2
GLUCOSE SERPL-MCNC: 171 MG/DL (ref 70–110)
HCT VFR BLD AUTO: 26.6 % (ref 40–54)
HCT VFR BLD AUTO: 34.3 % (ref 40–54)
HGB BLD-MCNC: 11 G/DL (ref 14–18)
HGB BLD-MCNC: 8.5 G/DL (ref 14–18)
IMM GRANULOCYTES # BLD AUTO: 0.03 K/UL (ref 0–0.04)
IMM GRANULOCYTES # BLD AUTO: 0.03 K/UL (ref 0–0.04)
IMM GRANULOCYTES NFR BLD AUTO: 0.3 % (ref 0–0.5)
IMM GRANULOCYTES NFR BLD AUTO: 0.4 % (ref 0–0.5)
INR PPP: 1.2 (ref 0.8–1.2)
LYMPHOCYTES # BLD AUTO: 0.9 K/UL (ref 1–4.8)
LYMPHOCYTES # BLD AUTO: 1 K/UL (ref 1–4.8)
LYMPHOCYTES NFR BLD: 12.5 % (ref 18–48)
LYMPHOCYTES NFR BLD: 9.1 % (ref 18–48)
MCH RBC QN AUTO: 29.1 PG (ref 27–31)
MCH RBC QN AUTO: 29.2 PG (ref 27–31)
MCHC RBC AUTO-ENTMCNC: 32 G/DL (ref 32–36)
MCHC RBC AUTO-ENTMCNC: 32.1 G/DL (ref 32–36)
MCV RBC AUTO: 91 FL (ref 82–98)
MCV RBC AUTO: 91 FL (ref 82–98)
MONOCYTES # BLD AUTO: 0.5 K/UL (ref 0.3–1)
MONOCYTES # BLD AUTO: 0.6 K/UL (ref 0.3–1)
MONOCYTES NFR BLD: 5.8 % (ref 4–15)
MONOCYTES NFR BLD: 6.3 % (ref 4–15)
NEUTROPHILS # BLD AUTO: 5.4 K/UL (ref 1.8–7.7)
NEUTROPHILS # BLD AUTO: 7.4 K/UL (ref 1.8–7.7)
NEUTROPHILS NFR BLD: 68.2 % (ref 38–73)
NEUTROPHILS NFR BLD: 72.4 % (ref 38–73)
NRBC BLD-RTO: 0 /100 WBC
NRBC BLD-RTO: 0 /100 WBC
NUM UNITS TRANS PACKED RBC: NORMAL
NUM UNITS TRANS PACKED RBC: NORMAL
PLATELET # BLD AUTO: 154 K/UL (ref 150–350)
PLATELET # BLD AUTO: 193 K/UL (ref 150–350)
PMV BLD AUTO: 11.2 FL (ref 9.2–12.9)
PMV BLD AUTO: 11.4 FL (ref 9.2–12.9)
POCT GLUCOSE: 159 MG/DL (ref 70–110)
POTASSIUM SERPL-SCNC: 4.6 MMOL/L (ref 3.5–5.1)
PROT SERPL-MCNC: 6.2 G/DL (ref 6–8.4)
PROTHROMBIN TIME: 12.9 SEC (ref 9–12.5)
RBC # BLD AUTO: 2.91 M/UL (ref 4.6–6.2)
RBC # BLD AUTO: 3.78 M/UL (ref 4.6–6.2)
SODIUM SERPL-SCNC: 138 MMOL/L (ref 136–145)
WBC # BLD AUTO: 10.16 K/UL (ref 3.9–12.7)
WBC # BLD AUTO: 7.91 K/UL (ref 3.9–12.7)

## 2019-10-21 PROCEDURE — 20000000 HC ICU ROOM

## 2019-10-21 PROCEDURE — 82962 GLUCOSE BLOOD TEST: CPT

## 2019-10-21 PROCEDURE — 86920 COMPATIBILITY TEST SPIN: CPT

## 2019-10-21 PROCEDURE — 96361 HYDRATE IV INFUSION ADD-ON: CPT

## 2019-10-21 PROCEDURE — 99285 EMERGENCY DEPT VISIT HI MDM: CPT | Mod: 25

## 2019-10-21 PROCEDURE — 85610 PROTHROMBIN TIME: CPT

## 2019-10-21 PROCEDURE — 25000003 PHARM REV CODE 250: Performed by: SURGERY

## 2019-10-21 PROCEDURE — G0378 HOSPITAL OBSERVATION PER HR: HCPCS

## 2019-10-21 PROCEDURE — 85025 COMPLETE CBC W/AUTO DIFF WBC: CPT

## 2019-10-21 PROCEDURE — 99222 1ST HOSP IP/OBS MODERATE 55: CPT | Mod: AI,,, | Performed by: SURGERY

## 2019-10-21 PROCEDURE — 96360 HYDRATION IV INFUSION INIT: CPT

## 2019-10-21 PROCEDURE — 63600175 PHARM REV CODE 636 W HCPCS: Performed by: EMERGENCY MEDICINE

## 2019-10-21 PROCEDURE — 63600175 PHARM REV CODE 636 W HCPCS: Performed by: SURGERY

## 2019-10-21 PROCEDURE — 86850 RBC ANTIBODY SCREEN: CPT

## 2019-10-21 PROCEDURE — 80053 COMPREHEN METABOLIC PANEL: CPT

## 2019-10-21 PROCEDURE — P9016 RBC LEUKOCYTES REDUCED: HCPCS

## 2019-10-21 PROCEDURE — 96372 THER/PROPH/DIAG INJ SC/IM: CPT | Mod: 59

## 2019-10-21 PROCEDURE — 99222 PR INITIAL HOSPITAL CARE,LEVL II: ICD-10-PCS | Mod: AI,,, | Performed by: SURGERY

## 2019-10-21 PROCEDURE — 36415 COLL VENOUS BLD VENIPUNCTURE: CPT

## 2019-10-21 RX ORDER — CARVEDILOL 3.12 MG/1
6.25 TABLET ORAL 2 TIMES DAILY
Status: DISCONTINUED | OUTPATIENT
Start: 2019-10-21 | End: 2019-10-25 | Stop reason: HOSPADM

## 2019-10-21 RX ORDER — HYDROCODONE BITARTRATE AND ACETAMINOPHEN 500; 5 MG/1; MG/1
TABLET ORAL
Status: DISCONTINUED | OUTPATIENT
Start: 2019-10-21 | End: 2019-10-23

## 2019-10-21 RX ORDER — SODIUM CHLORIDE 9 MG/ML
INJECTION, SOLUTION INTRAVENOUS CONTINUOUS
Status: DISCONTINUED | OUTPATIENT
Start: 2019-10-21 | End: 2019-10-24

## 2019-10-21 RX ORDER — PANTOPRAZOLE SODIUM 40 MG/1
40 TABLET, DELAYED RELEASE ORAL DAILY
Status: DISCONTINUED | OUTPATIENT
Start: 2019-10-21 | End: 2019-10-25 | Stop reason: HOSPADM

## 2019-10-21 RX ORDER — PHYTONADIONE 10 MG/ML
10 INJECTION, EMULSION INTRAMUSCULAR; INTRAVENOUS; SUBCUTANEOUS ONCE
Status: COMPLETED | OUTPATIENT
Start: 2019-10-21 | End: 2019-10-21

## 2019-10-21 RX ORDER — SODIUM CHLORIDE 0.9 % (FLUSH) 0.9 %
10 SYRINGE (ML) INJECTION
Status: DISCONTINUED | OUTPATIENT
Start: 2019-10-21 | End: 2019-10-25 | Stop reason: HOSPADM

## 2019-10-21 RX ADMIN — PHYTONADIONE 10 MG: 10 INJECTION, EMULSION INTRAMUSCULAR; INTRAVENOUS; SUBCUTANEOUS at 07:10

## 2019-10-21 RX ADMIN — CARVEDILOL 6.25 MG: 3.12 TABLET, FILM COATED ORAL at 08:10

## 2019-10-21 RX ADMIN — SODIUM CHLORIDE 1000 ML: 0.9 INJECTION, SOLUTION INTRAVENOUS at 10:10

## 2019-10-21 RX ADMIN — SODIUM CHLORIDE: 0.9 INJECTION, SOLUTION INTRAVENOUS at 07:10

## 2019-10-21 RX ADMIN — SODIUM CHLORIDE 1000 ML: 0.9 INJECTION, SOLUTION INTRAVENOUS at 01:10

## 2019-10-21 NOTE — CONSULTS
[facility] - Med Surg  General Surgery  Consult Note    Patient Name: Nilton Sherwood  MRN: [id number]  Admission Date: 10/21/2019  Attending Physician: Sha Merrill III, MD   Consult Physician: Jerad Jackson MD  Primary Care Provider: Sha Merrill III, MD    Patient information was obtained from patient.     Subjective:     Chief Complaint/Reason for Admission: Rectal bleeding    History of Present Illness:  Nilton Sherwood is a 74 y.o. male with a history of anemia, prostate cancer, colon polyps, diabetes, elevated PSA, hyperlipidemia, numerous past medical problems including previous coronary artery bypass grafting currently on Plavix therapy presented to the ER this morning with blood per rectum.  Patient underwent EGD and colonoscopy 2 weeks prior.  Since EGD and colonoscopy the patient has done well. He had polypectomy as well as biopsies of stomach on EGD and colonoscopy.  He had stop Plavix for 1 week.  Restarted last week.  Noted blood in the stool last night.  Couple more bowel movements since that time.  One bowel movement this morning.  Is now stop.  Patient admitted to the medical mayes, is now I as a surgeon on duty was called in consultation  Review of patient's allergies indicates:  No Known Allergies    Past Medical History:   Diagnosis Date    Anemia 7/31/2017    B12    Cancer Feb 2017    Prostate    Cardiomyopathy     Cataract     Colon polyp     Coronary artery disease involving native coronary artery of native heart with angina pectoris 5/18/2017    Diabetes mellitus, type 2     Elevated glucose     Elevated PSA     Hyperlipidemia     Presence of stent in coronary artery 11/15/2017    Prostate cancer     Substance abuse     Tobacco abuse     Wears partial dentures     lowers     Past Surgical History:   Procedure Laterality Date    CARDIAC CATHETERIZATION  11/15/2017    LANNY to pCirc and Left main    CARDIAC DEFIBRILLATOR PLACEMENT Left  03/21/2018    CATARACT EXTRACTION Right     COLONOSCOPY      6 years ago    COLONOSCOPY N/A 10/8/2019    Procedure: COLONOSCOPY;  Surgeon: Jerad Jackson MD;  Location: [facility] ENDO;  Service: General;  Laterality: N/A;    CORONARY ARTERY BYPASS GRAFT  05/02/2017    CABG x 3    CORONARY ARTERY BYPASS GRAFT (CABG)  05/02/2017    triple bypass    ESOPHAGOGASTRODUODENOSCOPY N/A 10/8/2019    Procedure: ESOPHAGOGASTRODUODENOSCOPY (EGD);  Surgeon: Jerad Jackson MD;  Location: [facility] ENDO;  Service: General;  Laterality: N/A;    EYE SURGERY      right cataract removal    PROSTATE BIOPSY  Jan 2014    Negative    PROSTATE SURGERY      Biopsy    TRANSRECTAL ULTRASOUND OF PROSTATE WITH INSERTION OF GOLD FIDUCIAL MARKER N/A 2/7/2019    Procedure: ULTRASOUND, PROSTATE, Perineal APPROACH, WITH GOLD FIDUCIAL MARKER INSERTION;  Surgeon: Narinder Her MD;  Location: [facility] OR;  Service: Urology;  Laterality: N/A;     Family History     Problem Relation (Age of Onset)    Alzheimer's disease Maternal Aunt    Cancer Sister    Heart disease Mother, Father, Brother    Heart failure Mother    Lung cancer Sister        Tobacco Use    Smoking status: Light Tobacco Smoker     Packs/day: 0.25     Years: 20.00     Pack years: 5.00     Types: Cigarettes    Smokeless tobacco: Never Used    Tobacco comment: Smokes 3 cig a day   Substance and Sexual Activity    Alcohol use: No    Drug use: No    Sexual activity: Not Currently     Review of Systems   Constitutional: Negative for appetite change, chills and fever.   HENT: Negative for congestion, dental problem and drooling.    Eyes: Negative for photophobia, discharge and itching.   Respiratory: Negative for apnea and chest tightness.    Cardiovascular: Negative for chest pain, palpitations and leg swelling.   Gastrointestinal: Positive for anal bleeding. Negative for abdominal distention and abdominal pain.   Endocrine: Negative for cold intolerance and heat  intolerance.   Genitourinary: Negative for difficulty urinating and dysuria.   Musculoskeletal: Negative for arthralgias and back pain.   Skin: Negative for color change and pallor.   Neurological: Negative for dizziness, facial asymmetry and headaches.   Hematological: Negative for adenopathy. Does not bruise/bleed easily.   Psychiatric/Behavioral: Negative for agitation, behavioral problems and confusion.     Objective:     Vital Signs (Most Recent):  Temp: 97 °F (36.1 °C) (10/21/19 1532)  Pulse: 65 (10/21/19 1532)  Resp: 18 (10/21/19 1532)  BP: (!) 112/59 (10/21/19 1532)  SpO2: 99 % (10/21/19 1532) Vital Signs (24h Range):  Temp:  [96.8 °F (36 °C)-98.1 °F (36.7 °C)] 97 °F (36.1 °C)  Pulse:  [63-78] 65  Resp:  [18-20] 18  SpO2:  [99 %-100 %] 99 %  BP: (109-134)/(48-63) 112/59     Weight: 63.5 kg (140 lb)  Body mass index is 21.93 kg/m².    Physical Exam   Constitutional: He is oriented to person, place, and time. He appears well-developed and well-nourished.   HENT:   Head: Normocephalic and atraumatic.   Eyes: Pupils are equal, round, and reactive to light. EOM are normal.   Neck: Normal range of motion. Neck supple. No thyromegaly present.   Cardiovascular: Normal rate and regular rhythm.   No murmur heard.  Pulmonary/Chest: Effort normal and breath sounds normal. No respiratory distress.   Abdominal: Soft. Bowel sounds are normal. He exhibits no distension. There is no tenderness.   Musculoskeletal: Normal range of motion. He exhibits no edema.   Neurological: He is alert and oriented to person, place, and time. No cranial nerve deficit.   Skin: Skin is warm. Capillary refill takes less than 2 seconds. No rash noted. He is not diaphoretic. No erythema.   Psychiatric: He has a normal mood and affect.       Significant Labs:  CBC:   Recent Labs   Lab 10/21/19  1539   WBC 7.91   RBC 2.91*   HGB 8.5*   HCT 26.6*      MCV 91   MCH 29.2   MCHC 32.0     BMP:   Recent Labs   Lab 10/21/19  1005   *   NA  138   K 4.6      CO2 26   BUN 15   CREATININE 0.8   CALCIUM 8.9     CMP:   Recent Labs   Lab 10/21/19  1005   *   CALCIUM 8.9   ALBUMIN 3.7   PROT 6.2      K 4.6   CO2 26      BUN 15   CREATININE 0.8   ALKPHOS 61   ALT 17   AST 14   BILITOT 0.9     LFTs:   Recent Labs   Lab 10/21/19  1005   ALT 17   AST 14   ALKPHOS 61   BILITOT 0.9   PROT 6.2   ALBUMIN 3.7     Coagulation:   Recent Labs   Lab 10/21/19  1005   LABPROT 12.9*   INR 1.2     Specimen (12h ago, onward)    None        No results for input(s): COLORU, CLARITYU, SPECGRAV, PHUR, PROTEINUA, GLUCOSEU, BILIRUBINCON, BLOODU, WBCU, RBCU, BACTERIA, MUCUS, NITRITE, LEUKOCYTESUR, UROBILINOGEN, HYALINECASTS in the last 168 hours.      Assessment:   Nilton Sherwood is a 74 y.o. male who presents with GI Bleed.    Active Diagnoses:    Diagnosis Date Noted POA    Acute lower gastrointestinal hemorrhage [K92.2] 10/21/2019 Yes      Problems Resolved During this Admission:     VTE Risk Mitigation (From admission, onward)         Ordered     IP VTE LOW RISK PATIENT  Once      10/21/19 1241     Place JOANNA hose  Until discontinued      10/21/19 1241                Medical Decision Making/Plan:  Continued close observation.  Tx 2 units of pRBCs now with a history of cardiac issues.  2 BRBPR bowel movements since admission.  CBC after transfusion.  Consider platelet tx.  Consider tx to ICU however patient currently stable at this time.  Further management including possible need for colonoscopy or surgery to depend on the patient's clinical course.    Jerad Jackson MD  General Surgery  [facility] - Med Surg

## 2019-10-21 NOTE — NURSING
Report received from PARUL Prasad at 1207.  Patient arrived to floor at 1238 from ER via W/C.  Patient oriented to room, bed in lowest and locked position with bed alarm on, urinal at bedside.  Respirations even and unlabored, patient has no complaints of any pain or discomfort at this time. Patient instructed to call when needing to get up, verbalized understanding.

## 2019-10-21 NOTE — PLAN OF CARE
10/21/19 3110   GONZALES Message   Medicare Outpatient and Observation Notification regarding financial responsibility Given to patient/caregiver;Explained to patient/caregiver;Signed/date by patient/caregiver   Date GONZALES was signed 10/21/19   Time GONZALES was signed [id number]

## 2019-10-21 NOTE — ED PROVIDER NOTES
Encounter Date: 10/21/2019       History     Chief Complaint   Patient presents with    Rectal Bleeding     Patient reports he has blood in his stool that started yesterday at 1700.     74-year-old male complains of acute lower GI hemorrhage where as at approximately 0530 yesterday he had some blood mixed with stool and this morning he had another episode generally of then dark blood    Denies any acute abdominal pain  Significant past medical history including coronary artery disease status post bypass on chronic aspirin and Plavix therapy  13 days ago he had routine colonoscopy and EGD states he was found to have some polyps then underwent polypectomy and is known to have diverticulosis without any acute recent diverticulitis    Denies rectal pain   Has ext hemorrhoids but no acute inflammation     No current facility-administered medications for this encounter.     Current Outpatient Medications:     aspirin (ECOTRIN) 81 MG EC tablet, Take 81 mg by mouth once daily., Disp: , Rfl:     atorvastatin (LIPITOR) 40 MG tablet, Take 40 mg by mouth once daily., Disp: , Rfl:     carvedilol (COREG) 6.25 MG tablet, Take 1 tablet (6.25 mg total) by mouth 2 (two) times daily., Disp: 180 tablet, Rfl: 1    clopidogrel (PLAVIX) 75 mg tablet, Take 75 mg by mouth once daily., Disp: , Rfl:     cyanocobalamin, vitamin B-12, 1,000 mcg TbSR, Take 1,000 mcg by mouth once daily., Disp: , Rfl: 0    doxazosin (CARDURA) 1 MG tablet, Take 1 mg by mouth every evening., Disp: , Rfl:     lisinopril (PRINIVIL,ZESTRIL) 2.5 MG tablet, Take 1 tablet (2.5 mg total) by mouth once daily., Disp: 90 tablet, Rfl: 1    metFORMIN (GLUCOPHAGE) 1000 MG tablet, Take 1 tablet (1,000 mg total) by mouth 2 (two) times daily with meals., Disp: 180 tablet, Rfl: 3    pantoprazole (PROTONIX) 40 MG tablet, Take 1 tablet (40 mg total) by mouth once daily., Disp: 30 tablet, Rfl: 6              Review of patient's allergies indicates:  No Known Allergies  Past  Medical History:   Diagnosis Date    Anemia 7/31/2017    B12    Cancer Feb 2017    Prostate    Cardiomyopathy     Cataract     Colon polyp     Coronary artery disease involving native coronary artery of native heart with angina pectoris 5/18/2017    Diabetes mellitus, type 2     Elevated glucose     Elevated PSA     Hyperlipidemia     Presence of stent in coronary artery 11/15/2017    Prostate cancer     Substance abuse     Tobacco abuse     Wears partial dentures     lowers     Past Surgical History:   Procedure Laterality Date    CARDIAC CATHETERIZATION  11/15/2017    LANNY to pCirc and Left main    CARDIAC DEFIBRILLATOR PLACEMENT Left 03/21/2018    CATARACT EXTRACTION Right     COLONOSCOPY      6 years ago    COLONOSCOPY N/A 10/8/2019    Procedure: COLONOSCOPY;  Surgeon: Jerad Jackson MD;  Location: [facility] ENDO;  Service: General;  Laterality: N/A;    CORONARY ARTERY BYPASS GRAFT  05/02/2017    CABG x 3    CORONARY ARTERY BYPASS GRAFT (CABG)  05/02/2017    triple bypass    ESOPHAGOGASTRODUODENOSCOPY N/A 10/8/2019    Procedure: ESOPHAGOGASTRODUODENOSCOPY (EGD);  Surgeon: Jerad Jackson MD;  [facility];  Service: General;  Laterality: N/A;    EYE SURGERY      right cataract removal    PROSTATE BIOPSY  Jan 2014    Negative    PROSTATE SURGERY      Biopsy    TRANSRECTAL ULTRASOUND OF PROSTATE WITH INSERTION OF GOLD FIDUCIAL MARKER N/A 2/7/2019    Procedure: ULTRASOUND, PROSTATE, Perineal APPROACH, WITH GOLD FIDUCIAL MARKER INSERTION;  Surgeon: Narinder Her MD;  Location: [facility];  Service: Urology;  Laterality: N/A;     Family History   Problem Relation Age of Onset    Heart disease Mother     Heart failure Mother     Heart disease Father     Heart disease Brother     Cancer Sister     Lung cancer Sister     Alzheimer's disease Maternal Aunt      Social History     Tobacco Use    Smoking status: Light Tobacco Smoker     Packs/day: 0.25     Years: 20.00      Pack years: 5.00     Types: Cigarettes    Smokeless tobacco: Never Used    Tobacco comment: Smokes 3 cig a day   Substance Use Topics    Alcohol use: No    Drug use: No     Review of Systems   Constitutional: Negative.    Respiratory: Negative.    Cardiovascular: Negative.    Gastrointestinal: Positive for anal bleeding. Negative for abdominal distention, abdominal pain, blood in stool, constipation, diarrhea, nausea, rectal pain and vomiting.   Genitourinary: Negative.  Negative for decreased urine volume, dysuria and hematuria.   Musculoskeletal: Negative.    Skin: Negative.    Neurological: Negative.    Hematological: Negative for adenopathy. Bruises/bleeds easily.   Psychiatric/Behavioral: Negative for confusion.   All other systems reviewed and are negative.      Physical Exam     Initial Vitals [10/21/19 0933]   BP Pulse Resp Temp SpO2   (!) 109/48 78 20 98.1 °F (36.7 °C) 99 %      MAP       --         Physical Exam    Nursing note and vitals reviewed.  Constitutional: He appears well-developed and well-nourished. He is not diaphoretic. No distress.   HENT:   Head: Normocephalic and atraumatic.   Mouth/Throat: Oropharynx is clear and moist.   Eyes: Conjunctivae and EOM are normal. Pupils are equal, round, and reactive to light.   Neck: Neck supple. Carotid bruit is not present. No JVD present.   Cardiovascular: Normal rate, regular rhythm, normal heart sounds and intact distal pulses.  No extrasystoles are present.  Exam reveals no gallop and no friction rub.    No murmur heard.  Pulses:       Radial pulses are 2+ on the right side, and 2+ on the left side.   Pulmonary/Chest: Breath sounds normal. No respiratory distress. He has no decreased breath sounds. He has no wheezes. He has no rhonchi. He has no rales. He exhibits no tenderness.   Abdominal: Soft. Bowel sounds are normal. He exhibits no distension and no mass. There is no tenderness. There is no rebound and no guarding.   Genitourinary: Rectal  exam shows external hemorrhoid and guaiac positive stool. Rectal exam shows no internal hemorrhoid, no fissure, no mass, no tenderness and anal tone normal. Guaiac positive stool. : Acceptable.  Genitourinary Comments: Gross thin dark blood present on ERNESTO   Musculoskeletal: Normal range of motion. He exhibits no edema or tenderness.   Neurological: He is alert and oriented to person, place, and time. He has normal strength. No sensory deficit. GCS score is 15. GCS eye subscore is 4. GCS verbal subscore is 5. GCS motor subscore is 6.   Skin: Skin is warm and dry. Capillary refill takes less than 2 seconds. No rash noted. No erythema. No pallor.   Psychiatric: He has a normal mood and affect. His behavior is normal. Judgment and thought content normal.         ED Course   Procedures  Labs Reviewed   CBC W/ AUTO DIFFERENTIAL - Abnormal; Notable for the following components:       Result Value    RBC 3.78 (*)     Hemoglobin 11.0 (*)     Hematocrit 34.3 (*)     Lymph # 0.9 (*)     Eos # 1.2 (*)     Lymph% 9.1 (*)     Eosinophil% 11.8 (*)     All other components within normal limits   COMPREHENSIVE METABOLIC PANEL - Abnormal; Notable for the following components:    Glucose 171 (*)     All other components within normal limits   PROTIME-INR - Abnormal; Notable for the following components:    Prothrombin Time 12.9 (*)     All other components within normal limits   POCT GLUCOSE - Abnormal; Notable for the following components:    POCT Glucose 159 (*)     All other components within normal limits   TYPE & SCREEN    Narrative:     PATIENT HAS NOT BEEN ROOMED YET, ER NURSES TO CALL ONCE PATIENT IS IN A ROOM     Admission on 10/21/2019   Component Date Value Ref Range Status    WBC 10/21/2019 10.16  3.90 - 12.70 K/uL Final    RBC 10/21/2019 3.78* 4.60 - 6.20 M/uL Final    Hemoglobin 10/21/2019 11.0* 14.0 - 18.0 g/dL Final    Hematocrit 10/21/2019 34.3* 40.0 - 54.0 % Final    Mean Corpuscular Volume  10/21/2019 91  82 - 98 fL Final    Mean Corpuscular Hemoglobin 10/21/2019 29.1  27.0 - 31.0 pg Final    Mean Corpuscular Hemoglobin Conc 10/21/2019 32.1  32.0 - 36.0 g/dL Final    RDW 10/21/2019 13.7  11.5 - 14.5 % Final    Platelets 10/21/2019 193  150 - 350 K/uL Final    MPV 10/21/2019 11.4  9.2 - 12.9 fL Final    Immature Granulocytes 10/21/2019 0.3  0.0 - 0.5 % Final    Gran # (ANC) 10/21/2019 7.4  1.8 - 7.7 K/uL Final    Immature Grans (Abs) 10/21/2019 0.03  0.00 - 0.04 K/uL Final    Comment: Mild elevation in immature granulocytes is non specific and   can be seen in a variety of conditions including stress response,   acute inflammation, trauma and pregnancy. Correlation with other   laboratory and clinical findings is essential.      Lymph # 10/21/2019 0.9* 1.0 - 4.8 K/uL Final    Mono # 10/21/2019 0.6  0.3 - 1.0 K/uL Final    Eos # 10/21/2019 1.2* 0.0 - 0.5 K/uL Final    Baso # 10/21/2019 0.06  0.00 - 0.20 K/uL Final    nRBC 10/21/2019 0  0 /100 WBC Final    Gran% 10/21/2019 72.4  38.0 - 73.0 % Final    Lymph% 10/21/2019 9.1* 18.0 - 48.0 % Final    Mono% 10/21/2019 5.8  4.0 - 15.0 % Final    Eosinophil% 10/21/2019 11.8* 0.0 - 8.0 % Final    Basophil% 10/21/2019 0.6  0.0 - 1.9 % Final    Differential Method 10/21/2019 Automated   Final    Sodium 10/21/2019 138  136 - 145 mmol/L Final    Potassium 10/21/2019 4.6  3.5 - 5.1 mmol/L Final    Chloride 10/21/2019 104  95 - 110 mmol/L Final    CO2 10/21/2019 26  23 - 29 mmol/L Final    Glucose 10/21/2019 171* 70 - 110 mg/dL Final    BUN, Bld 10/21/2019 15  8 - 23 mg/dL Final    Creatinine 10/21/2019 0.8  0.5 - 1.4 mg/dL Final    Calcium 10/21/2019 8.9  8.7 - 10.5 mg/dL Final    Total Protein 10/21/2019 6.2  6.0 - 8.4 g/dL Final    Albumin 10/21/2019 3.7  3.5 - 5.2 g/dL Final    Total Bilirubin 10/21/2019 0.9  0.1 - 1.0 mg/dL Final    Comment: For infants and newborns, interpretation of results should be based  on gestational age,  weight and in agreement with clinical  observations.  Premature Infant recommended reference ranges:  Up to 24 hours.............<8.0 mg/dL  Up to 48 hours............<12.0 mg/dL  3-5 days..................<15.0 mg/dL  6-29 days.................<15.0 mg/dL      Alkaline Phosphatase 10/21/2019 61  55 - 135 U/L Final    AST 10/21/2019 14  10 - 40 U/L Final    ALT 10/21/2019 17  10 - 44 U/L Final    Anion Gap 10/21/2019 8  8 - 16 mmol/L Final    eGFR if African American 10/21/2019 >60.0  >60 mL/min/1.73 m^2 Final    eGFR if non African American 10/21/2019 >60.0  >60 mL/min/1.73 m^2 Final    Comment: Calculation used to obtain the estimated glomerular filtration  rate (eGFR) is the CKD-EPI equation.       Prothrombin Time 10/21/2019 12.9* 9.0 - 12.5 sec Final    INR 10/21/2019 1.2  0.8 - 1.2 Final    Comment: Coumadin Therapy:  2.0 - 3.0 for INR for all indicators except mechanical heart valves  and antiphospholipid syndromes which should use 2.5 - 3.5.      Group & Rh 10/21/2019 A POS   Final    Indirect Dileep GEL 10/21/2019 NEG   Final    POCT Glucose 10/21/2019 159* 70 - 110 mg/dL Final            Imaging Results    None          Medical Decision Making:   Clinical Tests:   Lab Tests: Ordered and Reviewed  ED Management:  Acute blood loss anemia secondary to lower GI hemorrhage  Chronic aspirin Plavix therapy  Patient is stable to proceed to the floor for observation and General surgery consultation as discussed with Dr. Jackson and Dr. Merrill  Other:   I have discussed this case with another health care provider.                   ED Course as of Oct 21 1139   Mon Oct 21, 2019   1002 Dr Jackson - N/A @ 1002    [KG]   1015 Discussed with Dr. Jackson  Recommends admission to primary care for observation and he will see in consultation    [KG]   1115 Discussed with Dr. Merrill    [KG]      ED Course User Index  [KG] Omar Gamboa MD     Clinical Impression:       ICD-10-CM ICD-9-CM   1. Acute  blood loss anemia D62 285.1   2. Acute lower gastrointestinal hemorrhage K92.2 578.9         Disposition:   Disposition: Placed in Observation  Condition: Stable                        Omar Gamboa MD  10/21/19 1141

## 2019-10-21 NOTE — PLAN OF CARE
10/21/19 1429   Discharge Assessment   Assessment Type Discharge Planning Assessment   Confirmed/corrected address and phone number on facesheet? Yes   Assessment information obtained from? Patient   Expected Length of Stay (days) 2   Communicated expected length of stay with patient/caregiver yes   Prior to hospitilization cognitive status: Alert/Oriented   Prior to hospitalization functional status: Independent   Current cognitive status: Alert/Oriented   Current Functional Status: Independent   Lives With alone   Able to Return to Prior Arrangements yes   Is patient able to care for self after discharge? Yes   Who are your caregiver(s) and their phone number(s)? No caregiver- but has friend locally for assistance or neice that lives in [place]   Readmission Within the Last 30 Days no previous admission in last 30 days   Equipment Currently Used at Home none   Do you have any problems affording any of your prescribed medications? No   Is the patient taking medications as prescribed? yes   Does the patient have transportation home? Yes   Transportation Anticipated car, drives self;family or friend will provide   Does the patient receive services at the [facility]? No   Discharge Plan A Home   DME Needed Upon Discharge  none   Patient/Family in Agreement with Plan yes     Pt is resting in bed. States he lives alone, but has a friend that lives close by (Blayne Li [phone]) or niece that lives in [place] (Sherri Marie [phone]) for assistance.  Denies having used any dme or home health services.  Drove self to the hospital and would like to drive self home, but can call friend if necessary.  Denies any needs at this time.  Case management will continue to follow for further discharge needs.

## 2019-10-22 ENCOUNTER — ANESTHESIA (OUTPATIENT)
Dept: SURGERY | Facility: HOSPITAL | Age: 74
DRG: 378 | End: 2019-10-22
Payer: MEDICARE

## 2019-10-22 ENCOUNTER — ANESTHESIA EVENT (OUTPATIENT)
Dept: SURGERY | Facility: HOSPITAL | Age: 74
DRG: 378 | End: 2019-10-22
Payer: MEDICARE

## 2019-10-22 LAB
BASOPHILS # BLD AUTO: 0.04 K/UL (ref 0–0.2)
BASOPHILS # BLD AUTO: 0.04 K/UL (ref 0–0.2)
BASOPHILS # BLD AUTO: 0.05 K/UL (ref 0–0.2)
BASOPHILS # BLD AUTO: 0.07 K/UL (ref 0–0.2)
BASOPHILS NFR BLD: 0.5 % (ref 0–1.9)
BASOPHILS NFR BLD: 0.5 % (ref 0–1.9)
BASOPHILS NFR BLD: 0.6 % (ref 0–1.9)
BASOPHILS NFR BLD: 0.7 % (ref 0–1.9)
BLD PROD TYP BPU: NORMAL
BLOOD UNIT EXPIRATION DATE: NORMAL
BLOOD UNIT TYPE CODE: 5100
BLOOD UNIT TYPE CODE: 6200
BLOOD UNIT TYPE CODE: 6200
BLOOD UNIT TYPE: NORMAL
CODING SYSTEM: NORMAL
DIFFERENTIAL METHOD: ABNORMAL
DISPENSE STATUS: NORMAL
EOSINOPHIL # BLD AUTO: 0.8 K/UL (ref 0–0.5)
EOSINOPHIL # BLD AUTO: 0.8 K/UL (ref 0–0.5)
EOSINOPHIL # BLD AUTO: 0.9 K/UL (ref 0–0.5)
EOSINOPHIL # BLD AUTO: 1 K/UL (ref 0–0.5)
EOSINOPHIL NFR BLD: 10.7 % (ref 0–8)
EOSINOPHIL NFR BLD: 11 % (ref 0–8)
EOSINOPHIL NFR BLD: 9.6 % (ref 0–8)
EOSINOPHIL NFR BLD: 9.6 % (ref 0–8)
ERYTHROCYTE [DISTWIDTH] IN BLOOD BY AUTOMATED COUNT: 13.8 % (ref 11.5–14.5)
ERYTHROCYTE [DISTWIDTH] IN BLOOD BY AUTOMATED COUNT: 13.8 % (ref 11.5–14.5)
ERYTHROCYTE [DISTWIDTH] IN BLOOD BY AUTOMATED COUNT: 13.9 % (ref 11.5–14.5)
ERYTHROCYTE [DISTWIDTH] IN BLOOD BY AUTOMATED COUNT: 13.9 % (ref 11.5–14.5)
ERYTHROCYTE [DISTWIDTH] IN BLOOD BY AUTOMATED COUNT: 14 % (ref 11.5–14.5)
ERYTHROCYTE [DISTWIDTH] IN BLOOD BY AUTOMATED COUNT: 14.1 % (ref 11.5–14.5)
HCT VFR BLD AUTO: 25.9 % (ref 40–54)
HCT VFR BLD AUTO: 28.2 % (ref 40–54)
HCT VFR BLD AUTO: 28.2 % (ref 40–54)
HCT VFR BLD AUTO: 29.4 % (ref 40–54)
HCT VFR BLD AUTO: 31.4 % (ref 40–54)
HCT VFR BLD AUTO: 31.4 % (ref 40–54)
HGB BLD-MCNC: 10 G/DL (ref 14–18)
HGB BLD-MCNC: 10.3 G/DL (ref 14–18)
HGB BLD-MCNC: 10.3 G/DL (ref 14–18)
HGB BLD-MCNC: 8.6 G/DL (ref 14–18)
HGB BLD-MCNC: 9.6 G/DL (ref 14–18)
HGB BLD-MCNC: 9.6 G/DL (ref 14–18)
IMM GRANULOCYTES # BLD AUTO: 0.02 K/UL (ref 0–0.04)
IMM GRANULOCYTES # BLD AUTO: 0.03 K/UL (ref 0–0.04)
IMM GRANULOCYTES NFR BLD AUTO: 0.2 % (ref 0–0.5)
IMM GRANULOCYTES NFR BLD AUTO: 0.3 % (ref 0–0.5)
LYMPHOCYTES # BLD AUTO: 0.9 K/UL (ref 1–4.8)
LYMPHOCYTES # BLD AUTO: 0.9 K/UL (ref 1–4.8)
LYMPHOCYTES # BLD AUTO: 1.1 K/UL (ref 1–4.8)
LYMPHOCYTES # BLD AUTO: 1.4 K/UL (ref 1–4.8)
LYMPHOCYTES NFR BLD: 11.2 % (ref 18–48)
LYMPHOCYTES NFR BLD: 11.2 % (ref 18–48)
LYMPHOCYTES NFR BLD: 13.2 % (ref 18–48)
LYMPHOCYTES NFR BLD: 15.4 % (ref 18–48)
MCH RBC QN AUTO: 29.4 PG (ref 27–31)
MCH RBC QN AUTO: 29.4 PG (ref 27–31)
MCH RBC QN AUTO: 29.7 PG (ref 27–31)
MCH RBC QN AUTO: 30.1 PG (ref 27–31)
MCHC RBC AUTO-ENTMCNC: 32.8 G/DL (ref 32–36)
MCHC RBC AUTO-ENTMCNC: 32.8 G/DL (ref 32–36)
MCHC RBC AUTO-ENTMCNC: 33.2 G/DL (ref 32–36)
MCHC RBC AUTO-ENTMCNC: 34 G/DL (ref 32–36)
MCV RBC AUTO: 88 FL (ref 82–98)
MCV RBC AUTO: 88 FL (ref 82–98)
MCV RBC AUTO: 89 FL (ref 82–98)
MCV RBC AUTO: 89 FL (ref 82–98)
MCV RBC AUTO: 90 FL (ref 82–98)
MCV RBC AUTO: 90 FL (ref 82–98)
MONOCYTES # BLD AUTO: 0.5 K/UL (ref 0.3–1)
MONOCYTES # BLD AUTO: 0.5 K/UL (ref 0.3–1)
MONOCYTES # BLD AUTO: 0.6 K/UL (ref 0.3–1)
MONOCYTES # BLD AUTO: 0.6 K/UL (ref 0.3–1)
MONOCYTES NFR BLD: 5.8 % (ref 4–15)
MONOCYTES NFR BLD: 6 % (ref 4–15)
MONOCYTES NFR BLD: 6.8 % (ref 4–15)
MONOCYTES NFR BLD: 6.8 % (ref 4–15)
NEUTROPHILS # BLD AUTO: 5.7 K/UL (ref 1.8–7.7)
NEUTROPHILS # BLD AUTO: 6 K/UL (ref 1.8–7.7)
NEUTROPHILS # BLD AUTO: 6 K/UL (ref 1.8–7.7)
NEUTROPHILS # BLD AUTO: 6.2 K/UL (ref 1.8–7.7)
NEUTROPHILS NFR BLD: 66.8 % (ref 38–73)
NEUTROPHILS NFR BLD: 69.3 % (ref 38–73)
NEUTROPHILS NFR BLD: 71.7 % (ref 38–73)
NEUTROPHILS NFR BLD: 71.7 % (ref 38–73)
NRBC BLD-RTO: 0 /100 WBC
NUM UNITS TRANS PACKED RBC: NORMAL
NUM UNITS TRANS PACKED RBC: NORMAL
PLATELET # BLD AUTO: 132 K/UL (ref 150–350)
PLATELET # BLD AUTO: 143 K/UL (ref 150–350)
PLATELET # BLD AUTO: 143 K/UL (ref 150–350)
PLATELET # BLD AUTO: 160 K/UL (ref 150–350)
PLATELET # BLD AUTO: 171 K/UL (ref 150–350)
PLATELET # BLD AUTO: 171 K/UL (ref 150–350)
PMV BLD AUTO: 10.3 FL (ref 9.2–12.9)
PMV BLD AUTO: 11 FL (ref 9.2–12.9)
PMV BLD AUTO: 11.1 FL (ref 9.2–12.9)
PMV BLD AUTO: 11.1 FL (ref 9.2–12.9)
RBC # BLD AUTO: 2.9 M/UL (ref 4.6–6.2)
RBC # BLD AUTO: 3.19 M/UL (ref 4.6–6.2)
RBC # BLD AUTO: 3.19 M/UL (ref 4.6–6.2)
RBC # BLD AUTO: 3.32 M/UL (ref 4.6–6.2)
RBC # BLD AUTO: 3.5 M/UL (ref 4.6–6.2)
RBC # BLD AUTO: 3.5 M/UL (ref 4.6–6.2)
TRANS PLATPHERESIS VOL PATIENT: NORMAL ML
WBC # BLD AUTO: 8.2 K/UL (ref 3.9–12.7)
WBC # BLD AUTO: 8.42 K/UL (ref 3.9–12.7)
WBC # BLD AUTO: 8.42 K/UL (ref 3.9–12.7)
WBC # BLD AUTO: 9.18 K/UL (ref 3.9–12.7)
WBC # BLD AUTO: 9.34 K/UL (ref 3.9–12.7)
WBC # BLD AUTO: 9.34 K/UL (ref 3.9–12.7)

## 2019-10-22 PROCEDURE — 25000003 PHARM REV CODE 250: Performed by: SURGERY

## 2019-10-22 PROCEDURE — 63600175 PHARM REV CODE 636 W HCPCS: Performed by: NURSE ANESTHETIST, CERTIFIED REGISTERED

## 2019-10-22 PROCEDURE — 85025 COMPLETE CBC W/AUTO DIFF WBC: CPT | Mod: 91

## 2019-10-22 PROCEDURE — 37000008 HC ANESTHESIA 1ST 15 MINUTES: Performed by: SURGERY

## 2019-10-22 PROCEDURE — D9220A PRA ANESTHESIA: Mod: CRNA,,, | Performed by: NURSE ANESTHETIST, CERTIFIED REGISTERED

## 2019-10-22 PROCEDURE — D9220A PRA ANESTHESIA: ICD-10-PCS | Mod: CRNA,,, | Performed by: NURSE ANESTHETIST, CERTIFIED REGISTERED

## 2019-10-22 PROCEDURE — P9016 RBC LEUKOCYTES REDUCED: HCPCS

## 2019-10-22 PROCEDURE — D9220A PRA ANESTHESIA: Mod: ANES,,, | Performed by: ANESTHESIOLOGY

## 2019-10-22 PROCEDURE — 85027 COMPLETE CBC AUTOMATED: CPT

## 2019-10-22 PROCEDURE — 45378 PR COLONOSCOPY,DIAGNOSTIC: ICD-10-PCS | Mod: ,,, | Performed by: SURGERY

## 2019-10-22 PROCEDURE — 45378 DIAGNOSTIC COLONOSCOPY: CPT | Mod: ,,, | Performed by: SURGERY

## 2019-10-22 PROCEDURE — 45378 DIAGNOSTIC COLONOSCOPY: CPT | Performed by: SURGERY

## 2019-10-22 PROCEDURE — 99233 PR SUBSEQUENT HOSPITAL CARE,LEVL III: ICD-10-PCS | Mod: 25,,, | Performed by: SURGERY

## 2019-10-22 PROCEDURE — D9220A PRA ANESTHESIA: ICD-10-PCS | Mod: ANES,,, | Performed by: ANESTHESIOLOGY

## 2019-10-22 PROCEDURE — 20000000 HC ICU ROOM

## 2019-10-22 PROCEDURE — 63600175 PHARM REV CODE 636 W HCPCS: Performed by: SURGERY

## 2019-10-22 PROCEDURE — P9035 PLATELET PHERES LEUKOREDUCED: HCPCS

## 2019-10-22 PROCEDURE — 99233 SBSQ HOSP IP/OBS HIGH 50: CPT | Mod: 25,,, | Performed by: SURGERY

## 2019-10-22 PROCEDURE — 96361 HYDRATE IV INFUSION ADD-ON: CPT

## 2019-10-22 PROCEDURE — 37000009 HC ANESTHESIA EA ADD 15 MINS: Performed by: SURGERY

## 2019-10-22 PROCEDURE — 36415 COLL VENOUS BLD VENIPUNCTURE: CPT

## 2019-10-22 PROCEDURE — 36430 TRANSFUSION BLD/BLD COMPNT: CPT

## 2019-10-22 RX ORDER — PROPOFOL 10 MG/ML
VIAL (ML) INTRAVENOUS
Status: DISCONTINUED | OUTPATIENT
Start: 2019-10-22 | End: 2019-10-22

## 2019-10-22 RX ORDER — FAMOTIDINE 10 MG/ML
20 INJECTION INTRAVENOUS ONCE
Status: DISCONTINUED | OUTPATIENT
Start: 2019-10-22 | End: 2019-10-23

## 2019-10-22 RX ORDER — SODIUM CHLORIDE, SODIUM LACTATE, POTASSIUM CHLORIDE, CALCIUM CHLORIDE 600; 310; 30; 20 MG/100ML; MG/100ML; MG/100ML; MG/100ML
125 INJECTION, SOLUTION INTRAVENOUS CONTINUOUS
Status: DISCONTINUED | OUTPATIENT
Start: 2019-10-22 | End: 2019-10-23

## 2019-10-22 RX ORDER — HYDROCODONE BITARTRATE AND ACETAMINOPHEN 500; 5 MG/1; MG/1
TABLET ORAL
Status: DISCONTINUED | OUTPATIENT
Start: 2019-10-22 | End: 2019-10-23

## 2019-10-22 RX ORDER — SODIUM CHLORIDE, SODIUM LACTATE, POTASSIUM CHLORIDE, CALCIUM CHLORIDE 600; 310; 30; 20 MG/100ML; MG/100ML; MG/100ML; MG/100ML
INJECTION, SOLUTION INTRAVENOUS CONTINUOUS
Status: DISCONTINUED | OUTPATIENT
Start: 2019-10-22 | End: 2019-10-23

## 2019-10-22 RX ADMIN — PROPOFOL 20 MG: 10 INJECTION, EMULSION INTRAVENOUS at 07:10

## 2019-10-22 RX ADMIN — PANTOPRAZOLE SODIUM 40 MG: 40 TABLET, DELAYED RELEASE ORAL at 12:10

## 2019-10-22 RX ADMIN — SODIUM CHLORIDE 300 ML: 0.9 INJECTION, SOLUTION INTRAVENOUS at 08:10

## 2019-10-22 RX ADMIN — CARVEDILOL 6.25 MG: 3.12 TABLET, FILM COATED ORAL at 12:10

## 2019-10-22 RX ADMIN — CARVEDILOL 6.25 MG: 3.12 TABLET, FILM COATED ORAL at 08:10

## 2019-10-22 RX ADMIN — PROPOFOL 10 MG: 10 INJECTION, EMULSION INTRAVENOUS at 07:10

## 2019-10-22 RX ADMIN — PROPOFOL 20 MG: 10 INJECTION, EMULSION INTRAVENOUS at 08:10

## 2019-10-22 RX ADMIN — PROPOFOL 10 MG: 10 INJECTION, EMULSION INTRAVENOUS at 08:10

## 2019-10-22 RX ADMIN — SODIUM CHLORIDE: 0.9 INJECTION, SOLUTION INTRAVENOUS at 12:10

## 2019-10-22 RX ADMIN — SODIUM CHLORIDE 200 ML: 0.9 INJECTION, SOLUTION INTRAVENOUS at 07:10

## 2019-10-22 NOTE — NURSING
Notified Dr. Merrill and Dr. Jackson of changes in H/H levels after pt has received 2 units of blood tonight. Dr. Jackson's orders have been received. Will continue to monitor.

## 2019-10-22 NOTE — NURSING
Surgery nurses are here now to take MR. Sherwood to surgery. PRBCs and platelets ordered. Reported to OR nurse PARUL Khanna that platelets are having to to be brought to the hospital from [place], [place]. PT taken to the OR at this time.

## 2019-10-22 NOTE — ANESTHESIA PREPROCEDURE EVALUATION
10/22/2019  Nilton Sherwood is a 74 y.o., male.    Anesthesia Evaluation    I have reviewed the Patient Summary Reports.    I have reviewed the Nursing Notes.   I have reviewed the Medications.     Review of Systems  Social:  Smoker    Hematology/Oncology:         -- Anemia: -- Coag Disorders: Bleeding Disorder: currently taking: clopidogrel    EENT/Dental:EENT/Dental Normal   Cardiovascular:   CAD  CABG/stent Dysrhythmias  Angina    Renal/:  Renal/ Normal     Hepatic/GI:  Hepatic/GI Symptoms: hematochezia.    Endocrine:   Diabetes        Physical Exam  General:  Well nourished    Airway/Jaw/Neck:  Airway Findings: Mouth Opening: Small, but > 3cm Tongue: Normal  General Airway Assessment: Adult, Possible difficult intubation  Mallampati: III  TM Distance: 4 - 6 cm       Chest/Lungs:  Chest/Lungs Findings: Clear to auscultation     Heart/Vascular:  Heart Findings: Rate: Normal  Rhythm: Regular Rhythm        Mental Status:  Mental Status Findings:  Cooperative, Alert and Oriented         Anesthesia Plan  Type of Anesthesia, risks & benefits discussed:  Anesthesia Type:  general  Patient's Preference:   Intra-op Monitoring Plan: standard ASA monitors  Intra-op Monitoring Plan Comments:   Post Op Pain Control Plan: IV/PO Opioids PRN  Post Op Pain Control Plan Comments:   Induction:   IV  Beta Blocker:  Patient is on a Beta-Blocker and has received one dose within the past 24 hours (No further documentation required).       Informed Consent:  Anesthesia consent signed with patient.  ASA Score: 4     Day of Surgery Review of History & Physical: I have interviewed and examined the patient. I have reviewed the patient's H&P dated:            Ready For Surgery From Anesthesia Perspective.

## 2019-10-22 NOTE — [employer] PATIENT INSTRUCTIONS
Discharge Summary/Instructions after an Endoscopic Procedure  Patient Name: Nilton Sherwood  Patient MRN: [id number]  Patient YOB: 1945 Tuesday, October 22, 2019  Jerad Jackson MD  RESTRICTIONS:  During your procedure today, you received medications for sedation.  These   medications may affect your judgment, balance and coordination.  Therefore,   for 24 hours, you have the following restrictions:   - DO NOT drive a car, operate machinery, make legal/financial decisions,   sign important papers or drink alcohol.    ACTIVITY:  Today: no heavy lifting, straining or running due to procedural   sedation/anesthesia.  The following day: return to full activity including work.  DIET:  Eat and drink normally unless instructed otherwise.     TREATMENT FOR COMMON SIDE EFFECTS:  - Mild abdominal pain, nausea, belching, bloating or excessive gas:  rest,   eat lightly and use a heating pad.  - Sore Throat: treat with throat lozenges and/or gargle with warm salt   water.  - Because air was used during the procedure, expelling large amounts of air   from your rectum or belching is normal.  - If a bowel prep was taken, you may not have a bowel movement for 1-3 days.    This is normal.  SYMPTOMS TO WATCH FOR AND REPORT TO YOUR PHYSICIAN:  1. Abdominal pain or bloating, other than gas cramps.  2. Chest pain.  3. Back pain.  4. Signs of infection such as: chills or fever occurring within 24 hours   after the procedure.  5. Rectal bleeding, which would show as bright red, maroon, or black stools.   (A tablespoon of blood from the rectum is not serious, especially if   hemorrhoids are present.)  6. Vomiting.  7. Weakness or dizziness.  GO DIRECTLY TO THE NEAREST EMERGENCY ROOM IF YOU HAVE ANY OF THE FOLLOWING:      Difficulty breathing              Chills and/or fever over 101 F   Persistent vomiting and/or vomiting blood   Severe abdominal pain   Severe chest pain   Black, tarry stools   Bleeding- more than one  tablespoon   Any other symptom or condition that you feel may need urgent attention  Your doctor recommends these additional instructions:  If any biopsies were taken, your doctors clinic will contact you in 1 to 2   weeks with any results.  - Return patient to ICU for ongoing care.   - NPO.   - Continue present medications.   - Stop anticoagulants for now  - Consider transfusion of platelets  - Given the 2 units of pRBCs as ordered this am.  For questions, problems or results please call your physician - Jerad Jackson MD at Work:  [phone].  [facility] EMERGENCY ROOM PHONE NUMBER: [phone]  IF A COMPLICATION OR EMERGENCY SITUATION ARISES AND YOU ARE UNABLE TO REACH   YOUR PHYSICIAN - GO DIRECTLY TO THE EMERGENCY ROOM.  MD Jerad Perkins MD  10/22/2019 8:29:49 AM  This report has been verified and signed electronically.  [employer]

## 2019-10-22 NOTE — NURSING
Pt returns from PACU. VSS. Afebrile. SR. Denies pain. No s/s of active bleeding. Unit of PRBC's infusing at 125cc/hr to left a/c. No s/s of neg reaction noted. Pt has clear lung fields. Active bowel sounds. Pt is awake/alert.

## 2019-10-22 NOTE — SUBJECTIVE & OBJECTIVE
Interval History: seen with family  Hungry  Now s/p colonoscopy    Review of Systems   Constitutional: Negative.    Cardiovascular: Negative.    Gastrointestinal: Negative.    All other systems reviewed and are negative.    Objective:     Vital Signs (Most Recent):  Temp: 97.7 °F (36.5 °C) (10/22/19 1130)  Pulse: 70 (10/22/19 1145)  Resp: 16 (10/22/19 1145)  BP: 134/62 (10/22/19 1130)  SpO2: 100 % (10/22/19 1145) Vital Signs (24h Range):  Temp:  [97 °F (36.1 °C)-98.1 °F (36.7 °C)] 97.7 °F (36.5 °C)  Pulse:  [60-79] 70  Resp:  [0-30] 16  SpO2:  [95 %-100 %] 100 %  BP: ()/(41-80) 134/62     Weight: 63.5 kg (140 lb)  Body mass index is 21.93 kg/m².    Intake/Output Summary (Last 24 hours) at 10/22/2019 1245  Last data filed at 10/22/2019 1130  Gross per 24 hour   Intake 1660 ml   Output 200 ml   Net 1460 ml      Physical Exam   Constitutional: He is oriented to person, place, and time. Vital signs are normal. He appears well-developed and well-nourished. He is active.   HENT:   Head: Normocephalic and atraumatic.   Eyes: Pupils are equal, round, and reactive to light. Conjunctivae are normal.   Neck: Normal range of motion. Neck supple. No thyromegaly present.   Cardiovascular: Normal rate, regular rhythm and normal heart sounds.   Pulmonary/Chest: Effort normal and breath sounds normal.   Abdominal: Soft. Normal appearance and bowel sounds are normal. There is no tenderness.   Musculoskeletal: Normal range of motion.   Neurological: He is alert and oriented to person, place, and time.   Skin: Skin is warm and dry.   Psychiatric: He has a normal mood and affect. His behavior is normal. Judgment and thought content normal.   Nursing note and vitals reviewed.      Significant Labs:   Recent Lab Results       10/22/19  0526   10/22/19  0056   10/21/19  1734   10/21/19  1539        Unit Blood Type Code     5100[P]            6200[P]            6200[P]            6200            6200       Unit Expiration      [id number][P]            [id number][P]            [id number][P]            [id number]            [id number]       Unit Blood Type     O POS[P]            A POS[P]            A POS[P]            A POS            A POS       Baso # 0.05 0.07   0.05     Basophil% 0.6 0.7   0.6     CODING SYSTEM     RDOR572[P]            QJGR235[P]            PWIW251[P]            LFUN771            [id number]       Differential Method Automated Automated   Automated     DISPENSE STATUS     ISSUED[P]            ISSUED[P]            ISSUED[P]            TRANSFUSED            TRANSFUSED       Eos # 0.9 1.0   1.0     Eosinophil% 10.7 11.0   12.0     Gran # (ANC) 5.7 6.2   5.4     Gran% 69.3 66.8   68.2     Hematocrit 25.9 31.4   26.6        31.4         Hemoglobin 8.6 10.3   8.5        10.3         Immature Grans (Abs) 0.02  Comment:  Mild elevation in immature granulocytes is non specific and   can be seen in a variety of conditions including stress response,   acute inflammation, trauma and pregnancy. Correlation with other   laboratory and clinical findings is essential.   0.03  Comment:  Mild elevation in immature granulocytes is non specific and   can be seen in a variety of conditions including stress response,   acute inflammation, trauma and pregnancy. Correlation with other   laboratory and clinical findings is essential.     0.03  Comment:  Mild elevation in immature granulocytes is non specific and   can be seen in a variety of conditions including stress response,   acute inflammation, trauma and pregnancy. Correlation with other   laboratory and clinical findings is essential.       Immature Granulocytes 0.2 0.3   0.4     Lymph # 1.1 1.4   1.0     Lymph% 13.2 15.4   12.5     MCH 29.7 29.4   29.2        29.4         MCHC 33.2 32.8   32.0        32.8         MCV 89 90   91        90         Mono # 0.5 0.5   0.5     Mono% 6.0 5.8   6.3     MPV 11.0 11.1   11.2        11.1         nRBC 0 0   0     Platelets 132 143   154         143         Product Code     K3663I31[P]            R1129D66[P]            L8284L02[P]            [id number]            H0887H36       RBC 2.90 3.50   2.91        3.50         RDW 14.1 13.8   13.7        13.8         UNIT NUMBER     [id number][P]            [id number][P]            [id number][P]            [id number]            [id number]       WBC 8.20 9.34   7.91        9.34               Significant Imaging: I have reviewed and interpreted all pertinent imaging results/findings within the past 24 hours.

## 2019-10-22 NOTE — PROGRESS NOTES
[facility]  General Surgery  Daily Note    Patient Name: Nilton Sherwood  MRN: [id number]  Admission Date: 10/21/2019  Attending Physician: Sha Merrill III, MD   Consult Physician: Jerad Jackson MD  Primary Care Provider: Sha Merrill III, MD    Subjective:     Principle Problem: GI Bleed    Last 24 hour history:  10/22/19  Afebrile.  Vital signs stable.  Continued bleeding per rectum overnight.  No nausea vomiting.  Bleeding bright red per the patient's account.  Will plan to obtain 2 additional units of red blood cells and transfuse.  Make platelets available.  Proceed urgently this morning with colonoscopy, possible EGD.    Objective:     Vital Signs (Most Recent):  Temp: 97.8 °F (36.6 °C) (10/22/19 0649)  Pulse: 69 (10/22/19 0649)  Resp: 18 (10/22/19 0649)  BP: 106/80 (10/22/19 0649)  SpO2: 97 % (10/22/19 0649) Vital Signs (24h Range):  Temp:  [96.8 °F (36 °C)-98.1 °F (36.7 °C)] 97.8 °F (36.6 °C)  Pulse:  [60-79] 69  Resp:  [0-30] 18  SpO2:  [96 %-100 %] 97 %  BP: ()/(47-80) 106/80     Intake/Output last 24 hours:    Intake/Output Summary (Last 24 hours) at 10/22/2019 0707  Last data filed at 10/21/2019 2345  Gross per 24 hour   Intake 1125 ml   Output 200 ml   Net 925 ml       I/O last 3 completed shifts:  In: 1125 [I.V.:625; Blood:500]  Out: 200 [Urine:200]  No intake/output data recorded.    Weight: 63.5 kg (140 lb)  Body mass index is 21.93 kg/m².    Gen: Wd Wn male currently in NAD  Heent: Nc/At, MMM  Eyes: Perrl, Eomi  Cv: RRR, no  M/g/r  Lung: Non-labored breathing, clear bilaterally  Abd: Soft, non-tender, non-distended    Significant Labs:  CBC:   Recent Labs   Lab 10/22/19  0526   WBC 8.20   RBC 2.90*   HGB 8.6*   HCT 25.9*   *   MCV 89   MCH 29.7   MCHC 33.2     BMP:   Recent Labs   Lab 10/21/19  1005   *      K 4.6      CO2 26   BUN 15   CREATININE 0.8   CALCIUM 8.9     CMP:   Recent Labs   Lab 10/21/19  1005   *   CALCIUM  8.9   ALBUMIN 3.7   PROT 6.2      K 4.6   CO2 26      BUN 15   CREATININE 0.8   ALKPHOS 61   ALT 17   AST 14   BILITOT 0.9     LFTs:   Recent Labs   Lab 10/21/19  1005   ALT 17   AST 14   ALKPHOS 61   BILITOT 0.9   PROT 6.2   ALBUMIN 3.7     Coagulation:   Recent Labs   Lab 10/21/19  1005   LABPROT 12.9*   INR 1.2     Specimen (12h ago, onward)    None        No results for input(s): COLORU, CLARITYU, SPECGRAV, PHUR, PROTEINUA, GLUCOSEU, BILIRUBINCON, BLOODU, WBCU, RBCU, BACTERIA, MUCUS, NITRITE, LEUKOCYTESUR, UROBILINOGEN, HYALINECASTS in the last 168 hours.    Cultures:    Microbiology Results (last 7 days)     ** No results found for the last 168 hours. **          Assessment:   Nilton Sherwood is a 74 y.o. male who presents with GI Bleed.    Active Diagnoses:    Diagnosis Date Noted POA    Acute lower gastrointestinal hemorrhage [K92.2] 10/21/2019 Yes    Acute blood loss anemia [D62] 10/21/2019 Yes      Problems Resolved During this Admission:     VTE Risk Mitigation (From admission, onward)         Ordered     IP VTE LOW RISK PATIENT  Once      10/21/19 1241     Place JOANNA hose  Until discontinued      10/21/19 1241                Medical Decision Making/Plan:  Transfused 2 additional units of red blood cells.  Continued hematochezia overnight.  Proceed with colonoscopy this morning in the urgent fashion.  Possible need for surgical intervention.  This was discussed with the patient. He voiced understanding.  Risk of perforating the colon associated with colonoscopy was discussed.  Patient voiced understanding wished proceed today.  Make available platelets.    Jerad Jackson MD  General Surgery  [facility]

## 2019-10-22 NOTE — PROGRESS NOTES
[facility]  [place]    Patient underwent colonoscopy this morning.  Fair amount of blood within colon, red blood.  Previous polypectomy site seen in the right colon and proximal transverse colon.  No overt or active bleeding, no visible vessels.  Significant diverticulosis in the left colon.  Fair amount of blood with hematoma in the left colon.  Could this be diverticular related?  No overt or active bleeding seen in the left colon either.  Continue current management.  Two blood products this morning.  Consider transfusion of platelets.  Hold off on anticoagulant therapy.  Further management to depend upon patient's clinical course.

## 2019-10-22 NOTE — SUBJECTIVE & OBJECTIVE
Past Medical History:   Diagnosis Date    Anemia 7/31/2017    B12    Cancer Feb 2017    Prostate    Cardiomyopathy     Cataract     Colon polyp     Coronary artery disease involving native coronary artery of native heart with angina pectoris 5/18/2017    Diabetes mellitus, type 2     Elevated glucose     Elevated PSA     Hyperlipidemia     Presence of stent in coronary artery 11/15/2017    Prostate cancer     Substance abuse     Tobacco abuse     Wears partial dentures     lowers       Past Surgical History:   Procedure Laterality Date    CARDIAC CATHETERIZATION  11/15/2017    LANNY to pCirc and Left main    CARDIAC DEFIBRILLATOR PLACEMENT Left 03/21/2018    CATARACT EXTRACTION Right     COLONOSCOPY      6 years ago    COLONOSCOPY N/A 10/8/2019    Procedure: COLONOSCOPY;  Surgeon: Jerad Jackson MD;  Location: [facility] ENDO;  Service: General;  Laterality: N/A;    CORONARY ARTERY BYPASS GRAFT  05/02/2017    CABG x 3    CORONARY ARTERY BYPASS GRAFT (CABG)  05/02/2017    triple bypass    ESOPHAGOGASTRODUODENOSCOPY N/A 10/8/2019    Procedure: ESOPHAGOGASTRODUODENOSCOPY (EGD);  Surgeon: Jerad Jackson MD;  Location: [facility];  Service: General;  Laterality: N/A;    EYE SURGERY      right cataract removal    PROSTATE BIOPSY  Jan 2014    Negative    PROSTATE SURGERY      Biopsy    TRANSRECTAL ULTRASOUND OF PROSTATE WITH INSERTION OF GOLD FIDUCIAL MARKER N/A 2/7/2019    Procedure: ULTRASOUND, PROSTATE, Perineal APPROACH, WITH GOLD FIDUCIAL MARKER INSERTION;  Surgeon: Narinder Her MD;  Location: [facility] OR;  Service: Urology;  Laterality: N/A;       Review of patient's allergies indicates:  No Known Allergies    Current Facility-Administered Medications on File Prior to Encounter   Medication    lidocaine HCL 10 mg/ml (1%) injection 1 mL     Current Outpatient Medications on File Prior to Encounter   Medication Sig    aspirin (ECOTRIN) 81 MG EC tablet Take 81 mg by mouth once  daily.    atorvastatin (LIPITOR) 40 MG tablet Take 40 mg by mouth once daily.    carvedilol (COREG) 6.25 MG tablet Take 1 tablet (6.25 mg total) by mouth 2 (two) times daily.    clopidogrel (PLAVIX) 75 mg tablet Take 75 mg by mouth once daily.    cyanocobalamin, vitamin B-12, 1,000 mcg TbSR Take 1,000 mcg by mouth once daily.    doxazosin (CARDURA) 1 MG tablet Take 1 mg by mouth every evening.    lisinopril (PRINIVIL,ZESTRIL) 2.5 MG tablet Take 1 tablet (2.5 mg total) by mouth once daily.    metFORMIN (GLUCOPHAGE) 1000 MG tablet Take 1 tablet (1,000 mg total) by mouth 2 (two) times daily with meals.    pantoprazole (PROTONIX) 40 MG tablet Take 1 tablet (40 mg total) by mouth once daily.     Family History     Problem Relation (Age of Onset)    Alzheimer's disease Maternal Aunt    Cancer Sister    Heart disease Mother, Father, Brother    Heart failure Mother    Lung cancer Sister        Tobacco Use    Smoking status: Light Tobacco Smoker     Packs/day: 0.25     Years: 20.00     Pack years: 5.00     Types: Cigarettes    Smokeless tobacco: Never Used    Tobacco comment: Smokes 3 cig a day   Substance and Sexual Activity    Alcohol use: No    Drug use: No    Sexual activity: Not Currently     Review of Systems   Constitutional: Negative.    HENT: Negative.    Respiratory: Negative.    Gastrointestinal: Positive for blood in stool.   Genitourinary: Negative.    All other systems reviewed and are negative.    Objective:     Vital Signs (Most Recent):  Temp: 97.7 °F (36.5 °C) (10/22/19 1130)  Pulse: 70 (10/22/19 1145)  Resp: 16 (10/22/19 1145)  BP: 134/62 (10/22/19 1130)  SpO2: 100 % (10/22/19 1145) Vital Signs (24h Range):  Temp:  [96.8 °F (36 °C)-98.1 °F (36.7 °C)] 97.7 °F (36.5 °C)  Pulse:  [60-79] 70  Resp:  [0-30] 16  SpO2:  [95 %-100 %] 100 %  BP: ()/(41-80) 134/62     Weight: 63.5 kg (140 lb)  Body mass index is 21.93 kg/m².    Physical Exam   Constitutional: He is oriented to person, place, and  time. Vital signs are normal. He appears well-developed and well-nourished. He is active.   HENT:   Head: Normocephalic and atraumatic.   Eyes: Pupils are equal, round, and reactive to light. Conjunctivae are normal.   Neck: Normal range of motion. Neck supple. No thyromegaly present.   Cardiovascular: Normal rate, regular rhythm and normal heart sounds.   Pulmonary/Chest: Effort normal and breath sounds normal.   Abdominal: Soft. Normal appearance and bowel sounds are normal. There is no tenderness.   Musculoskeletal: Normal range of motion.   Neurological: He is alert and oriented to person, place, and time.   Skin: Skin is warm and dry.   Psychiatric: He has a normal mood and affect. His behavior is normal. Judgment and thought content normal.   Nursing note and vitals reviewed.        CRANIAL NERVES     CN III, IV, VI   Pupils are equal, round, and reactive to light.       Significant Labs:   Recent Lab Results       10/22/19  0526   10/22/19  0056   10/21/19  1734   10/21/19  1539        Unit Blood Type Code     5100[P]            6200[P]            6200[P]            6200            6200       Unit Expiration     [id number][P]            [id number][P]            [id number][P]            [id number]            [id number]       Unit Blood Type     O POS[P]            A POS[P]            A POS[P]            A POS            A POS       Baso # 0.05 0.07   0.05     Basophil% 0.6 0.7   0.6     CODING SYSTEM     [id number][P]            NADJ554[P]            SZTL386[P]            TCQH308            QTAT620       Differential Method Automated Automated   Automated     DISPENSE STATUS     ISSUED[P]            ISSUED[P]            ISSUED[P]            TRANSFUSED            TRANSFUSED       Eos # 0.9 1.0   1.0     Eosinophil% 10.7 11.0   12.0     Gran # (ANC) 5.7 6.2   5.4     Gran% 69.3 66.8   68.2     Hematocrit 25.9 31.4   26.6        31.4         Hemoglobin 8.6 10.3   8.5        10.3         Immature Grans  (Abs) 0.02  Comment:  Mild elevation in immature granulocytes is non specific and   can be seen in a variety of conditions including stress response,   acute inflammation, trauma and pregnancy. Correlation with other   laboratory and clinical findings is essential.   0.03  Comment:  Mild elevation in immature granulocytes is non specific and   can be seen in a variety of conditions including stress response,   acute inflammation, trauma and pregnancy. Correlation with other   laboratory and clinical findings is essential.     0.03  Comment:  Mild elevation in immature granulocytes is non specific and   can be seen in a variety of conditions including stress response,   acute inflammation, trauma and pregnancy. Correlation with other   laboratory and clinical findings is essential.       Immature Granulocytes 0.2 0.3   0.4     Lymph # 1.1 1.4   1.0     Lymph% 13.2 15.4   12.5     MCH 29.7 29.4   29.2        29.4         MCHC 33.2 32.8   32.0        32.8         MCV 89 90   91        90         Mono # 0.5 0.5   0.5     Mono% 6.0 5.8   6.3     MPV 11.0 11.1   11.2        11.1         nRBC 0 0   0     Platelets 132 143   154        143         Product Code     [id number][P]            [id number][P]            [id number][P]            C9537S71            [id number]       RBC 2.90 3.50   2.91        3.50         RDW 14.1 13.8   13.7        13.8         UNIT NUMBER     [id number][P]            [id number][P]            [id number][P]            [id number]            [id number]       WBC 8.20 9.34   7.91        9.34               Significant Imaging: I have reviewed and interpreted all pertinent imaging results/findings within the past 24 hours.

## 2019-10-22 NOTE — PROGRESS NOTES
[facility] - ICU Hospital Medicine  Progress Note    Patient Name: Nilton Sherwood  MRN: [id number]  Patient Class: IP- Inpatient   Admission Date: 10/21/2019  Length of Stay: 0 days  Attending Physician: hSa Merrill III, MD  Primary Care Provider: Sha Merrill III, MD        Subjective:     Principal Problem:Acute lower gastrointestinal hemorrhage        HPI:  No notes on file    Overview/Hospital Course:  No notes on file    Interval History: seen with family  Hungry  Now s/p colonoscopy    Review of Systems   Constitutional: Negative.    Cardiovascular: Negative.    Gastrointestinal: Negative.    All other systems reviewed and are negative.    Objective:     Vital Signs (Most Recent):  Temp: 97.7 °F (36.5 °C) (10/22/19 1130)  Pulse: 70 (10/22/19 1145)  Resp: 16 (10/22/19 1145)  BP: 134/62 (10/22/19 1130)  SpO2: 100 % (10/22/19 1145) Vital Signs (24h Range):  Temp:  [97 °F (36.1 °C)-98.1 °F (36.7 °C)] 97.7 °F (36.5 °C)  Pulse:  [60-79] 70  Resp:  [0-30] 16  SpO2:  [95 %-100 %] 100 %  BP: ()/(41-80) 134/62     Weight: 63.5 kg (140 lb)  Body mass index is 21.93 kg/m².    Intake/Output Summary (Last 24 hours) at 10/22/2019 1245  Last data filed at 10/22/2019 1130  Gross per 24 hour   Intake 1660 ml   Output 200 ml   Net 1460 ml      Physical Exam   Constitutional: He is oriented to person, place, and time. Vital signs are normal. He appears well-developed and well-nourished. He is active.   HENT:   Head: Normocephalic and atraumatic.   Eyes: Pupils are equal, round, and reactive to light. Conjunctivae are normal.   Neck: Normal range of motion. Neck supple. No thyromegaly present.   Cardiovascular: Normal rate, regular rhythm and normal heart sounds.   Pulmonary/Chest: Effort normal and breath sounds normal.   Abdominal: Soft. Normal appearance and bowel sounds are normal. There is no tenderness.   Musculoskeletal: Normal range of motion.   Neurological: He is alert and oriented to  person, place, and time.   Skin: Skin is warm and dry.   Psychiatric: He has a normal mood and affect. His behavior is normal. Judgment and thought content normal.   Nursing note and vitals reviewed.      Significant Labs:   Recent Lab Results       10/22/19  0526   10/22/19  0056   10/21/19  1734   10/21/19  1539        Unit Blood Type Code     5100[P]            6200[P]            6200[P]            6200            6200       Unit Expiration     [id number][P]            [id number][P]            [id number][P]            [id number]            [id number]       Unit Blood Type     O POS[P]            A POS[P]            A POS[P]            A POS            A POS       Baso # 0.05 0.07   0.05     Basophil% 0.6 0.7   0.6     CODING SYSTEM     SOUG238[P]            KOPF923[P]            VSSD065[P]            QVCD662            HPPN071       Differential Method Automated Automated   Automated     DISPENSE STATUS     ISSUED[P]            ISSUED[P]            ISSUED[P]            TRANSFUSED            TRANSFUSED       Eos # 0.9 1.0   1.0     Eosinophil% 10.7 11.0   12.0     Gran # (ANC) 5.7 6.2   5.4     Gran% 69.3 66.8   68.2     Hematocrit 25.9 31.4   26.6        31.4         Hemoglobin 8.6 10.3   8.5        10.3         Immature Grans (Abs) 0.02  Comment:  Mild elevation in immature granulocytes is non specific and   can be seen in a variety of conditions including stress response,   acute inflammation, trauma and pregnancy. Correlation with other   laboratory and clinical findings is essential.   0.03  Comment:  Mild elevation in immature granulocytes is non specific and   can be seen in a variety of conditions including stress response,   acute inflammation, trauma and pregnancy. Correlation with other   laboratory and clinical findings is essential.     0.03  Comment:  Mild elevation in immature granulocytes is non specific and   can be seen in a variety of conditions including stress response,   acute  inflammation, trauma and pregnancy. Correlation with other   laboratory and clinical findings is essential.       Immature Granulocytes 0.2 0.3   0.4     Lymph # 1.1 1.4   1.0     Lymph% 13.2 15.4   12.5     MCH 29.7 29.4   29.2        29.4         MCHC 33.2 32.8   32.0        32.8         MCV 89 90   91        90         Mono # 0.5 0.5   0.5     Mono% 6.0 5.8   6.3     MPV 11.0 11.1   11.2        11.1         nRBC 0 0   0     Platelets 132 143   154        143         Product Code     M3765X59[P]            Z0294I18[P]            W3828V36[P]            C1340P50            E6314T17       RBC 2.90 3.50   2.91        3.50         RDW 14.1 13.8   13.7        13.8         UNIT NUMBER     [id number][P]            [id number][P]            [id number][P]            [id number]            [id number]       WBC 8.20 9.34   7.91        9.34               Significant Imaging: I have reviewed and interpreted all pertinent imaging results/findings within the past 24 hours.      Assessment/Plan:      * Acute lower gastrointestinal hemorrhage  10.21  Admit  Follow hb  Consult surgery  Pt had cardiac stent several years ago  Stop anticoag    10.22  Now s/p colonoscopy  Hungry  Continue support including blood and products  Stop anticoagulation        VTE Risk Mitigation (From admission, onward)         Ordered     IP VTE LOW RISK PATIENT  Once      10/21/19 1241     Place JOANNA hose  Until discontinued      10/21/19 1241                      Sha Merrill III, MD  Department of Hospital Medicine   [facility] - ICU

## 2019-10-22 NOTE — H&P
[facility] - ICU Hospital Medicine  History & Physical    Patient Name: Nilton Sherwood  MRN: [id number]  Admission Date: 10/21/2019  Attending Physician: Sha Merrill III, MD  Primary Care Provider: Sha Merrill III, MD         Patient information was obtained from patient and ER records.     Subjective:     Principal Problem:Acute lower gastrointestinal hemorrhage    Chief Complaint:   Chief Complaint   Patient presents with    Rectal Bleeding     Patient reports he has blood in his stool that started yesterday at 1700.        HPI: No notes on file    Past Medical History:   Diagnosis Date    Anemia 7/31/2017    B12    Cancer Feb 2017    Prostate    Cardiomyopathy     Cataract     Colon polyp     Coronary artery disease involving native coronary artery of native heart with angina pectoris 5/18/2017    Diabetes mellitus, type 2     Elevated glucose     Elevated PSA     Hyperlipidemia     Presence of stent in coronary artery 11/15/2017    Prostate cancer     Substance abuse     Tobacco abuse     Wears partial dentures     lowers       Past Surgical History:   Procedure Laterality Date    CARDIAC CATHETERIZATION  11/15/2017    LANNY to pCirc and Left main    CARDIAC DEFIBRILLATOR PLACEMENT Left 03/21/2018    CATARACT EXTRACTION Right     COLONOSCOPY      6 years ago    COLONOSCOPY N/A 10/8/2019    Procedure: COLONOSCOPY;  Surgeon: Jerad Jackson MD;  Location: [facility] ENDO;  Service: General;  Laterality: N/A;    CORONARY ARTERY BYPASS GRAFT  05/02/2017    CABG x 3    CORONARY ARTERY BYPASS GRAFT (CABG)  05/02/2017    triple bypass    ESOPHAGOGASTRODUODENOSCOPY N/A 10/8/2019    Procedure: ESOPHAGOGASTRODUODENOSCOPY (EGD);  Surgeon: Jerad Jackson MD;  Location: [facility] ENDO;  Service: General;  Laterality: N/A;    EYE SURGERY      right cataract removal    PROSTATE BIOPSY  Jan 2014    Negative    PROSTATE SURGERY      Biopsy    TRANSRECTAL ULTRASOUND OF  PROSTATE WITH INSERTION OF GOLD FIDUCIAL MARKER N/A 2/7/2019    Procedure: ULTRASOUND, PROSTATE, Perineal APPROACH, WITH GOLD FIDUCIAL MARKER INSERTION;  Surgeon: Narinder Her MD;  Location: [facility];  Service: Urology;  Laterality: N/A;       Review of patient's allergies indicates:  No Known Allergies    Current Facility-Administered Medications on File Prior to Encounter   Medication    lidocaine HCL 10 mg/ml (1%) injection 1 mL     Current Outpatient Medications on File Prior to Encounter   Medication Sig    aspirin (ECOTRIN) 81 MG EC tablet Take 81 mg by mouth once daily.    atorvastatin (LIPITOR) 40 MG tablet Take 40 mg by mouth once daily.    carvedilol (COREG) 6.25 MG tablet Take 1 tablet (6.25 mg total) by mouth 2 (two) times daily.    clopidogrel (PLAVIX) 75 mg tablet Take 75 mg by mouth once daily.    cyanocobalamin, vitamin B-12, 1,000 mcg TbSR Take 1,000 mcg by mouth once daily.    doxazosin (CARDURA) 1 MG tablet Take 1 mg by mouth every evening.    lisinopril (PRINIVIL,ZESTRIL) 2.5 MG tablet Take 1 tablet (2.5 mg total) by mouth once daily.    metFORMIN (GLUCOPHAGE) 1000 MG tablet Take 1 tablet (1,000 mg total) by mouth 2 (two) times daily with meals.    pantoprazole (PROTONIX) 40 MG tablet Take 1 tablet (40 mg total) by mouth once daily.     Family History     Problem Relation (Age of Onset)    Alzheimer's disease Maternal Aunt    Cancer Sister    Heart disease Mother, Father, Brother    Heart failure Mother    Lung cancer Sister        Tobacco Use    Smoking status: Light Tobacco Smoker     Packs/day: 0.25     Years: 20.00     Pack years: 5.00     Types: Cigarettes    Smokeless tobacco: Never Used    Tobacco comment: Smokes 3 cig a day   Substance and Sexual Activity    Alcohol use: No    Drug use: No    Sexual activity: Not Currently     Review of Systems   Constitutional: Negative.    HENT: Negative.    Respiratory: Negative.    Gastrointestinal: Positive for blood in stool.    Genitourinary: Negative.    All other systems reviewed and are negative.    Objective:     Vital Signs (Most Recent):  Temp: 97.7 °F (36.5 °C) (10/22/19 1130)  Pulse: 70 (10/22/19 1145)  Resp: 16 (10/22/19 1145)  BP: 134/62 (10/22/19 1130)  SpO2: 100 % (10/22/19 1145) Vital Signs (24h Range):  Temp:  [96.8 °F (36 °C)-98.1 °F (36.7 °C)] 97.7 °F (36.5 °C)  Pulse:  [60-79] 70  Resp:  [0-30] 16  SpO2:  [95 %-100 %] 100 %  BP: ()/(41-80) 134/62     Weight: 63.5 kg (140 lb)  Body mass index is 21.93 kg/m².    Physical Exam   Constitutional: He is oriented to person, place, and time. Vital signs are normal. He appears well-developed and well-nourished. He is active.   HENT:   Head: Normocephalic and atraumatic.   Eyes: Pupils are equal, round, and reactive to light. Conjunctivae are normal.   Neck: Normal range of motion. Neck supple. No thyromegaly present.   Cardiovascular: Normal rate, regular rhythm and normal heart sounds.   Pulmonary/Chest: Effort normal and breath sounds normal.   Abdominal: Soft. Normal appearance and bowel sounds are normal. There is no tenderness.   Musculoskeletal: Normal range of motion.   Neurological: He is alert and oriented to person, place, and time.   Skin: Skin is warm and dry.   Psychiatric: He has a normal mood and affect. His behavior is normal. Judgment and thought content normal.   Nursing note and vitals reviewed.        CRANIAL NERVES     CN III, IV, VI   Pupils are equal, round, and reactive to light.       Significant Labs:   Recent Lab Results       10/22/19  0526   10/22/19  0056   10/21/19  1734   10/21/19  1539        Unit Blood Type Code     5100[P]            6200[P]            6200[P]            6200            6200       Unit Expiration     [id number][P]            [id number][P]            [id number][P]            [id number]       Unit Blood Type     O POS[P]            A POS[P]            A POS[P]            A POS            A POS        Baso # 0.05 0.07   0.05     Basophil% 0.6 0.7   0.6     CODING SYSTEM     GVEV347[P]            YVSZ702[P]            EFME505[P]            EVHE735            NZIE296       Differential Method Automated Automated   Automated     DISPENSE STATUS     ISSUED[P]            ISSUED[P]            ISSUED[P]            TRANSFUSED            TRANSFUSED       Eos # 0.9 1.0   1.0     Eosinophil% 10.7 11.0   12.0     Gran # (ANC) 5.7 6.2   5.4     Gran% 69.3 66.8   68.2     Hematocrit 25.9 31.4   26.6        31.4         Hemoglobin 8.6 10.3   8.5        10.3         Immature Grans (Abs) 0.02  Comment:  Mild elevation in immature granulocytes is non specific and   can be seen in a variety of conditions including stress response,   acute inflammation, trauma and pregnancy. Correlation with other   laboratory and clinical findings is essential.   0.03  Comment:  Mild elevation in immature granulocytes is non specific and   can be seen in a variety of conditions including stress response,   acute inflammation, trauma and pregnancy. Correlation with other   laboratory and clinical findings is essential.     0.03  Comment:  Mild elevation in immature granulocytes is non specific and   can be seen in a variety of conditions including stress response,   acute inflammation, trauma and pregnancy. Correlation with other   laboratory and clinical findings is essential.       Immature Granulocytes 0.2 0.3   0.4     Lymph # 1.1 1.4   1.0     Lymph% 13.2 15.4   12.5     MCH 29.7 29.4   29.2        29.4         MCHC 33.2 32.8   32.0        32.8         MCV 89 90   91        90         Mono # 0.5 0.5   0.5     Mono% 6.0 5.8   6.3     MPV 11.0 11.1   11.2        11.1         nRBC 0 0   0     Platelets 132 143   154        143         Product Code     Q0349H03[P]            I5767W02[P]            K8327L91[P]            U5835H92            K9308M20       RBC 2.90 3.50   2.91        3.50         RDW 14.1 13.8   13.7        13.8         UNIT  NUMBER     [id number][P]            [id number][P]            [id number][P]            [id number]            [id number]       WBC 8.20 9.34   7.91        9.34               Significant Imaging: I have reviewed and interpreted all pertinent imaging results/findings within the past 24 hours.    Assessment/Plan:     * Acute lower gastrointestinal hemorrhage  10.21  Admit  Follow hb  Consult surgery  Pt had cardiac stent several years ago  Stop anticoag        VTE Risk Mitigation (From admission, onward)         Ordered     IP VTE LOW RISK PATIENT  Once      10/21/19 1241     Place JOANNA hose  Until discontinued      10/21/19 1241                   Sha Merrill III, MD  Department of Hospital Medicine   [facility] - [facility]

## 2019-10-22 NOTE — TRANSFER OF CARE
"Anesthesia Transfer of Care Note    Patient: Nilton Sherwood    Procedure(s) Performed: Procedure(s) (LRB):  COLONOSCOPY (N/A)    Patient location: PACU    Anesthesia Type: general    Transport from OR: Transported from OR on room air with adequate spontaneous ventilation. Continuous ECG monitoring in transport. Continuous SpO2 monitoring in transport. Continuos invasive BP monitoring in transport. Upon arrival to PACU/ICU, patient attached to 100% O2 by T-piece with adequate spontaneous ventilation    Post pain: adequate analgesia    Post assessment: no apparent anesthetic complications and tolerated procedure well    Post vital signs: stable    Level of consciousness: sedated and responds to stimulation    Nausea/Vomiting: no nausea/vomiting    Complications: none    Transfer of care protocol was followed      Last vitals:   Visit Vitals  BP (!) 111/41   Pulse 70   Temp 36.6 °C (97.8 °F)   Resp 17   Ht 5' 7" (1.702 m)   Wt 63.5 kg (140 lb)   SpO2 95%   BMI 21.93 kg/m²     "

## 2019-10-22 NOTE — ANESTHESIA POSTPROCEDURE EVALUATION
Anesthesia Post Evaluation    Patient: Nilton Sherwood    Procedure(s) Performed: Procedure(s) (LRB):  COLONOSCOPY (N/A)    Final Anesthesia Type: general  Patient location during evaluation: PACU  Patient participation: Yes- Able to Participate  Level of consciousness: awake and alert  Post-procedure vital signs: reviewed and stable  Pain management: adequate  Airway patency: patent  PONV status at discharge: No PONV  Anesthetic complications: no      Cardiovascular status: blood pressure returned to baseline  Respiratory status: unassisted  Hydration status: euvolemic  Follow-up not needed.          Vitals Value Taken Time   /62 10/22/2019  4:02 PM   Temp 36.8 °C (98.2 °F) 10/22/2019  2:00 PM   Pulse 67 10/22/2019  4:07 PM   Resp 16 10/22/2019  4:07 PM   SpO2 97 % 10/22/2019  4:07 PM   Vitals shown include unvalidated device data.      Event Time     Out of Recovery 10/22/2019 09:15:00          Pain/Lonny Score: Lonny Score: 10 (10/22/2019  8:36 AM)

## 2019-10-22 NOTE — PLAN OF CARE
Pt arrives to PACU awake and alert, able to follow simple commands. PIV intact and infusing. 1st set of blood completed per CRNA. VSS no complaints will continue to monitor.

## 2019-10-22 NOTE — ASSESSMENT & PLAN NOTE
10.21  Admit  Follow hb  Consult surgery  Pt had cardiac stent several years ago  Stop anticoag    10.22  Now s/p colonoscopy  Hungry  Continue support including blood and products  Stop anticoagulation

## 2019-10-23 PROBLEM — Z87.19 HISTORY OF GI DIVERTICULAR BLEED: Status: ACTIVE | Noted: 2019-10-23

## 2019-10-23 LAB
BASOPHILS # BLD AUTO: 0.04 K/UL (ref 0–0.2)
BASOPHILS # BLD AUTO: 0.06 K/UL (ref 0–0.2)
BASOPHILS NFR BLD: 0.6 % (ref 0–1.9)
BASOPHILS NFR BLD: 0.7 % (ref 0–1.9)
DIFFERENTIAL METHOD: ABNORMAL
DIFFERENTIAL METHOD: ABNORMAL
EOSINOPHIL # BLD AUTO: 0.8 K/UL (ref 0–0.5)
EOSINOPHIL # BLD AUTO: 1 K/UL (ref 0–0.5)
EOSINOPHIL NFR BLD: 11.4 % (ref 0–8)
EOSINOPHIL NFR BLD: 11.5 % (ref 0–8)
ERYTHROCYTE [DISTWIDTH] IN BLOOD BY AUTOMATED COUNT: 14 % (ref 11.5–14.5)
ERYTHROCYTE [DISTWIDTH] IN BLOOD BY AUTOMATED COUNT: 14.2 % (ref 11.5–14.5)
HCT VFR BLD AUTO: 28.4 % (ref 40–54)
HCT VFR BLD AUTO: 28.5 % (ref 40–54)
HGB BLD-MCNC: 9.4 G/DL (ref 14–18)
HGB BLD-MCNC: 9.5 G/DL (ref 14–18)
IMM GRANULOCYTES # BLD AUTO: 0.02 K/UL (ref 0–0.04)
IMM GRANULOCYTES # BLD AUTO: 0.04 K/UL (ref 0–0.04)
IMM GRANULOCYTES NFR BLD AUTO: 0.3 % (ref 0–0.5)
IMM GRANULOCYTES NFR BLD AUTO: 0.5 % (ref 0–0.5)
LYMPHOCYTES # BLD AUTO: 0.8 K/UL (ref 1–4.8)
LYMPHOCYTES # BLD AUTO: 1 K/UL (ref 1–4.8)
LYMPHOCYTES NFR BLD: 11.2 % (ref 18–48)
LYMPHOCYTES NFR BLD: 11.5 % (ref 18–48)
MCH RBC QN AUTO: 29.7 PG (ref 27–31)
MCH RBC QN AUTO: 29.8 PG (ref 27–31)
MCHC RBC AUTO-ENTMCNC: 33.1 G/DL (ref 32–36)
MCHC RBC AUTO-ENTMCNC: 33.3 G/DL (ref 32–36)
MCV RBC AUTO: 89 FL (ref 82–98)
MCV RBC AUTO: 90 FL (ref 82–98)
MONOCYTES # BLD AUTO: 0.4 K/UL (ref 0.3–1)
MONOCYTES # BLD AUTO: 0.6 K/UL (ref 0.3–1)
MONOCYTES NFR BLD: 6 % (ref 4–15)
MONOCYTES NFR BLD: 7.3 % (ref 4–15)
NEUTROPHILS # BLD AUTO: 5 K/UL (ref 1.8–7.7)
NEUTROPHILS # BLD AUTO: 5.8 K/UL (ref 1.8–7.7)
NEUTROPHILS NFR BLD: 68.9 % (ref 38–73)
NEUTROPHILS NFR BLD: 70.1 % (ref 38–73)
NRBC BLD-RTO: 0 /100 WBC
NRBC BLD-RTO: 0 /100 WBC
PLATELET # BLD AUTO: 164 K/UL (ref 150–350)
PLATELET # BLD AUTO: 168 K/UL (ref 150–350)
PMV BLD AUTO: 10.8 FL (ref 9.2–12.9)
PMV BLD AUTO: 11.2 FL (ref 9.2–12.9)
RBC # BLD AUTO: 3.16 M/UL (ref 4.6–6.2)
RBC # BLD AUTO: 3.19 M/UL (ref 4.6–6.2)
WBC # BLD AUTO: 7.14 K/UL (ref 3.9–12.7)
WBC # BLD AUTO: 8.45 K/UL (ref 3.9–12.7)

## 2019-10-23 PROCEDURE — 36415 COLL VENOUS BLD VENIPUNCTURE: CPT

## 2019-10-23 PROCEDURE — 99232 PR SUBSEQUENT HOSPITAL CARE,LEVL II: ICD-10-PCS | Mod: ,,, | Performed by: SURGERY

## 2019-10-23 PROCEDURE — 63600175 PHARM REV CODE 636 W HCPCS: Performed by: SURGERY

## 2019-10-23 PROCEDURE — 85025 COMPLETE CBC W/AUTO DIFF WBC: CPT | Mod: 91

## 2019-10-23 PROCEDURE — 25000003 PHARM REV CODE 250: Performed by: SURGERY

## 2019-10-23 PROCEDURE — 20000000 HC ICU ROOM

## 2019-10-23 PROCEDURE — 99232 SBSQ HOSP IP/OBS MODERATE 35: CPT | Mod: ,,, | Performed by: SURGERY

## 2019-10-23 RX ADMIN — CARVEDILOL 6.25 MG: 3.12 TABLET, FILM COATED ORAL at 08:10

## 2019-10-23 RX ADMIN — SODIUM CHLORIDE: 0.9 INJECTION, SOLUTION INTRAVENOUS at 05:10

## 2019-10-23 RX ADMIN — PANTOPRAZOLE SODIUM 40 MG: 40 TABLET, DELAYED RELEASE ORAL at 08:10

## 2019-10-23 RX ADMIN — SODIUM CHLORIDE 125 ML/HR: 0.9 INJECTION, SOLUTION INTRAVENOUS at 08:10

## 2019-10-23 NOTE — PROGRESS NOTES
[facility] - ICU  General Surgery  Daily Note    Patient Name: Nilton Sherwood  MRN: [id number]  Admission Date: 10/21/2019  Attending Physician: Sha Merrill III, MD   Consult Physician: Jerad Jackson MD  Primary Care Provider: Sha Merrill III, MD    Subjective:     Principle Problem: GI Bleed    Last 24 hour history:  10/22/19  Afebrile.  Vital signs stable.  Continued bleeding per rectum overnight.  No nausea vomiting.  Bleeding bright red per the patient's account.  Will plan to obtain 2 additional units of red blood cells and transfuse.  Make platelets available.  Proceed urgently this morning with colonoscopy, possible EGD.    10/23/19  No issues since colonoscopy yesterday.  No recurrent bleeding.  H/H Stable.  No nausea or vomiting.  Patient hungry.  No new complaints.    Objective:     Vital Signs (Most Recent):  Temp: 97.9 °F (36.6 °C) (10/23/19 0730)  Pulse: 61 (10/23/19 0915)  Resp: 15 (10/23/19 0915)  BP: (!) 86/42 (10/23/19 0900)  SpO2: 98 % (10/23/19 0915) Vital Signs (24h Range):  Temp:  [97.7 °F (36.5 °C)-98.5 °F (36.9 °C)] 97.9 °F (36.6 °C)  Pulse:  [61-74] 61  Resp:  [5-25] 15  SpO2:  [94 %-100 %] 98 %  BP: ()/(37-96) 86/42     Intake/Output last 24 hours:    Intake/Output Summary (Last 24 hours) at 10/23/2019 1003  Last data filed at 10/23/2019 0900  Gross per 24 hour   Intake 2747.5 ml   Output 2630 ml   Net 117.5 ml       I/O last 3 completed shifts:  In: 3497.5 [I.V.:2237.5; Blood:1260]  Out: 2030 [Urine:2030]  I/O this shift:  In: -   Out: 600 [Urine:600]    Weight: 63.5 kg (140 lb)  Body mass index is 21.93 kg/m².    Gen: Wd Wn male currently in NAD  Heent: Nc/At, MMM  Eyes: Perrl, Eomi  Cv: RRR, no  M/g/r  Lung: Non-labored breathing, clear bilaterally  Abd: Soft, non-tender, non-distended    Significant Labs:  CBC:   Recent Labs   Lab 10/23/19  0441   WBC 8.45   RBC 3.19*   HGB 9.5*   HCT 28.5*      MCV 89   MCH 29.8   MCHC 33.3     BMP:    Recent Labs   Lab 10/21/19  1005   *      K 4.6      CO2 26   BUN 15   CREATININE 0.8   CALCIUM 8.9     CMP:   Recent Labs   Lab 10/21/19  1005   *   CALCIUM 8.9   ALBUMIN 3.7   PROT 6.2      K 4.6   CO2 26      BUN 15   CREATININE 0.8   ALKPHOS 61   ALT 17   AST 14   BILITOT 0.9     LFTs:   Recent Labs   Lab 10/21/19  1005   ALT 17   AST 14   ALKPHOS 61   BILITOT 0.9   PROT 6.2   ALBUMIN 3.7     Coagulation:   Recent Labs   Lab 10/21/19  1005   LABPROT 12.9*   INR 1.2     Specimen (12h ago, onward)    None        No results for input(s): COLORU, CLARITYU, SPECGRAV, PHUR, PROTEINUA, GLUCOSEU, BILIRUBINCON, BLOODU, WBCU, RBCU, BACTERIA, MUCUS, NITRITE, LEUKOCYTESUR, UROBILINOGEN, HYALINECASTS in the last 168 hours.    Cultures:    Microbiology Results (last 7 days)     ** No results found for the last 168 hours. **          Assessment:   Nilton Sherwood is a 74 y.o. male who presents with Divercular bleed.    Active Diagnoses:    Diagnosis Date Noted POA    PRINCIPAL PROBLEM:  History of GI diverticular bleed [Z87.19] 10/23/2019 Not Applicable    Acute lower gastrointestinal hemorrhage [K92.2] 10/21/2019 Yes    Acute blood loss anemia [D62] 10/21/2019 Yes      Problems Resolved During this Admission:     VTE Risk Mitigation (From admission, onward)         Ordered     IP VTE LOW RISK PATIENT  Once      10/21/19 1241     Place JOANNA hose  Until discontinued      10/21/19 1241                Medical Decision Making/Plan:  No recurrent bleeding.  H/H Stable.  Start CLD.  Advance to regular tonight if no concerns for bleeding.  Consider discharge tomorrow if no bleeding.      Jerad Jackson MD  General Surgery  [facility] - ICU

## 2019-10-23 NOTE — PLAN OF CARE
10/23/19 1147   Medicare Message   Important Message from Medicare regarding Discharge Appeal Rights Given to patient/caregiver;Explained to patient/caregiver;Signed/date by patient/caregiver   Date IMM was signed 10/23/19   Time IMM was signed 1143

## 2019-10-23 NOTE — PLAN OF CARE
Pt slept well through the night. Pt remained NPO. Pt did not have any stools. No complaints through the night. No falls or injuries. Will continue to monitor.

## 2019-10-23 NOTE — PLAN OF CARE
10/23/19 1229   Discharge Reassessment   Assessment Type Discharge Planning Reassessment   Anticipated Discharge Disposition Home   Provided patient/caregiver education on the expected discharge date and the discharge plan Yes   Do you have any problems affording any of your prescribed medications? No   Discharge Plan A Home   DME Needed Upon Discharge  none   Patient choice form signed by patient/caregiver N/A   Patient resting in bed. States hopes to go home tomorrow if he doesn't have any more bleeding. He lives alone but has a friend that will come pick him up & will help him if he needs anything. Will assist with follow up appointments.

## 2019-10-24 LAB
ANION GAP SERPL CALC-SCNC: 12 MMOL/L (ref 8–16)
BASOPHILS # BLD AUTO: 0.04 K/UL (ref 0–0.2)
BASOPHILS NFR BLD: 0.4 % (ref 0–1.9)
BUN SERPL-MCNC: 6 MG/DL (ref 8–23)
CALCIUM SERPL-MCNC: 7.7 MG/DL (ref 8.7–10.5)
CHLORIDE SERPL-SCNC: 115 MMOL/L (ref 95–110)
CO2 SERPL-SCNC: 17 MMOL/L (ref 23–29)
CREAT SERPL-MCNC: 0.6 MG/DL (ref 0.5–1.4)
DIFFERENTIAL METHOD: ABNORMAL
EOSINOPHIL # BLD AUTO: 0.9 K/UL (ref 0–0.5)
EOSINOPHIL NFR BLD: 10.1 % (ref 0–8)
ERYTHROCYTE [DISTWIDTH] IN BLOOD BY AUTOMATED COUNT: 14.1 % (ref 11.5–14.5)
EST. GFR  (AFRICAN AMERICAN): >60 ML/MIN/1.73 M^2
EST. GFR  (NON AFRICAN AMERICAN): >60 ML/MIN/1.73 M^2
GLUCOSE SERPL-MCNC: 122 MG/DL (ref 70–110)
HCT VFR BLD AUTO: 27.4 % (ref 40–54)
HGB BLD-MCNC: 9.2 G/DL (ref 14–18)
IMM GRANULOCYTES # BLD AUTO: 0.03 K/UL (ref 0–0.04)
IMM GRANULOCYTES NFR BLD AUTO: 0.3 % (ref 0–0.5)
LYMPHOCYTES # BLD AUTO: 0.8 K/UL (ref 1–4.8)
LYMPHOCYTES NFR BLD: 8.3 % (ref 18–48)
MCH RBC QN AUTO: 29.7 PG (ref 27–31)
MCHC RBC AUTO-ENTMCNC: 33.6 G/DL (ref 32–36)
MCV RBC AUTO: 88 FL (ref 82–98)
MONOCYTES # BLD AUTO: 0.9 K/UL (ref 0.3–1)
MONOCYTES NFR BLD: 9.6 % (ref 4–15)
NEUTROPHILS # BLD AUTO: 6.5 K/UL (ref 1.8–7.7)
NEUTROPHILS NFR BLD: 71.3 % (ref 38–73)
NRBC BLD-RTO: 0 /100 WBC
PLATELET # BLD AUTO: 177 K/UL (ref 150–350)
PMV BLD AUTO: 11.2 FL (ref 9.2–12.9)
POTASSIUM SERPL-SCNC: 3.3 MMOL/L (ref 3.5–5.1)
RBC # BLD AUTO: 3.1 M/UL (ref 4.6–6.2)
SODIUM SERPL-SCNC: 144 MMOL/L (ref 136–145)
WBC # BLD AUTO: 9.12 K/UL (ref 3.9–12.7)

## 2019-10-24 PROCEDURE — 25000003 PHARM REV CODE 250: Performed by: SURGERY

## 2019-10-24 PROCEDURE — 85025 COMPLETE CBC W/AUTO DIFF WBC: CPT

## 2019-10-24 PROCEDURE — 11000001 HC ACUTE MED/SURG PRIVATE ROOM

## 2019-10-24 PROCEDURE — 21400001 HC TELEMETRY ROOM

## 2019-10-24 PROCEDURE — 63600175 PHARM REV CODE 636 W HCPCS: Performed by: SURGERY

## 2019-10-24 PROCEDURE — 99232 SBSQ HOSP IP/OBS MODERATE 35: CPT | Mod: ,,, | Performed by: SURGERY

## 2019-10-24 PROCEDURE — 99232 PR SUBSEQUENT HOSPITAL CARE,LEVL II: ICD-10-PCS | Mod: ,,, | Performed by: SURGERY

## 2019-10-24 PROCEDURE — 80048 BASIC METABOLIC PNL TOTAL CA: CPT

## 2019-10-24 RX ADMIN — PANTOPRAZOLE SODIUM 40 MG: 40 TABLET, DELAYED RELEASE ORAL at 10:10

## 2019-10-24 RX ADMIN — SODIUM CHLORIDE 125 ML/HR: 0.9 INJECTION, SOLUTION INTRAVENOUS at 04:10

## 2019-10-24 RX ADMIN — CARVEDILOL 6.25 MG: 3.12 TABLET, FILM COATED ORAL at 10:10

## 2019-10-24 RX ADMIN — CARVEDILOL 6.25 MG: 3.12 TABLET, FILM COATED ORAL at 09:10

## 2019-10-24 NOTE — PLAN OF CARE
Patient alert and oriented. Ambulates with steady gait.  No complaints of pain.  No bloody stools.  Rested well throughout shift. YANNA, RN

## 2019-10-24 NOTE — NURSING
Patient arrived to room 116 via wheelchair from ICU.  Patient alert and oriented. Verbalizes needs. Gait steady. Oriented to room and call light.  Bed in lowest position with wheels locked and siderails up X 2.  Call light within reach. YANNA, RN

## 2019-10-24 NOTE — PROGRESS NOTES
[facility] - ICU Hospital Medicine  Progress Note    Patient Name: Nilton Sherwood  MRN: [id number]  Patient Class: IP- Inpatient   Admission Date: 10/21/2019  Length of Stay: 2 days  Attending Physician: Sha Merrill III, MD  Primary Care Provider: Sha Merrill III, MD        Subjective:     Principal Problem:History of GI diverticular bleed        HPI:  No notes on file    Overview/Hospital Course:  No notes on file    Interval History: no bm's  No bleeding  Tolerating po    Review of Systems   Constitutional: Negative.    HENT: Negative.    Respiratory: Negative.    Hematological: Negative.    All other systems reviewed and are negative.    Objective:     Vital Signs (Most Recent):  Temp: 98.2 °F (36.8 °C) (10/24/19 0400)  Pulse: 61 (10/24/19 0600)  Resp: (!) 27 (10/24/19 0600)  BP: 130/64 (10/24/19 0400)  SpO2: (!) 93 % (10/24/19 0600) Vital Signs (24h Range):  Temp:  [97.8 °F (36.6 °C)-98.2 °F (36.8 °C)] 98.2 °F (36.8 °C)  Pulse:  [60-64] 61  Resp:  [11-27] 27  SpO2:  [92 %-97 %] 93 %  BP: (106-137)/(58-67) 130/64     Weight: 63.5 kg (140 lb)  Body mass index is 21.93 kg/m².    Intake/Output Summary (Last 24 hours) at 10/24/2019 1036  Last data filed at 10/24/2019 0700  Gross per 24 hour   Intake 3734.17 ml   Output 2700 ml   Net 1034.17 ml      Physical Exam   Constitutional: He is oriented to person, place, and time. Vital signs are normal. He appears well-developed and well-nourished. He is active.   HENT:   Head: Normocephalic and atraumatic.   Eyes: Pupils are equal, round, and reactive to light. Conjunctivae are normal.   Neck: Normal range of motion. Neck supple. No thyromegaly present.   Cardiovascular: Normal rate, regular rhythm and normal heart sounds.   Pulmonary/Chest: Effort normal and breath sounds normal.   Abdominal: Soft. Normal appearance and bowel sounds are normal. There is no tenderness.   Musculoskeletal: Normal range of motion.   Neurological: He is alert and  oriented to person, place, and time.   Skin: Skin is warm and dry.   Psychiatric: He has a normal mood and affect. His behavior is normal. Judgment and thought content normal.   Nursing note and vitals reviewed.      Significant Labs:   Recent Lab Results       10/24/19  0415   10/23/19  1335        Anion Gap 12       Baso # 0.04 0.04     Basophil% 0.4 0.6     BUN, Bld 6       Calcium 7.7       Chloride 115       CO2 17       Creatinine 0.6       Differential Method Automated Automated     eGFR if  >60.0       eGFR if non  >60.0  Comment:  Calculation used to obtain the estimated glomerular filtration  rate (eGFR) is the CKD-EPI equation.          Eos # 0.9 0.8     Eosinophil% 10.1 11.5     Glucose 122       Gran # (ANC) 6.5 5.0     Gran% 71.3 70.1     Hematocrit 27.4 28.4     Hemoglobin 9.2 9.4     Immature Grans (Abs) 0.03  Comment:  Mild elevation in immature granulocytes is non specific and   can be seen in a variety of conditions including stress response,   acute inflammation, trauma and pregnancy. Correlation with other   laboratory and clinical findings is essential.   0.02  Comment:  Mild elevation in immature granulocytes is non specific and   can be seen in a variety of conditions including stress response,   acute inflammation, trauma and pregnancy. Correlation with other   laboratory and clinical findings is essential.       Immature Granulocytes 0.3 0.3     Lymph # 0.8 0.8     Lymph% 8.3 11.5     MCH 29.7 29.7     MCHC 33.6 33.1     MCV 88 90     Mono # 0.9 0.4     Mono% 9.6 6.0     MPV 11.2 10.8     nRBC 0 0     Platelets 177 164     Potassium 3.3       RBC 3.10 3.16     RDW 14.1 14.2     Sodium 144       WBC 9.12 7.14           Significant Imaging: I have reviewed and interpreted all pertinent imaging results/findings within the past 24 hours.      Assessment/Plan:      * History of GI diverticular bleed        Acute lower gastrointestinal  hemorrhage  10.21  Admit  Follow hb  Consult surgery  Pt had cardiac stent several years ago  Stop anticoag    10.22  Now s/p colonoscopy  Hungry  Continue support including blood and products  Stop anticoagulation    10.23  Much improved  No bleeding overnight  Continue supportive care    10.23  Improving  Transfer to floor  Increase kiersten      VTE Risk Mitigation (From admission, onward)         Ordered     IP VTE LOW RISK PATIENT  Once      10/21/19 1241     Place JOANNA hose  Until discontinued      10/21/19 1241                      Sha Merrill III, MD  Department of Hospital Medicine   [facility]

## 2019-10-24 NOTE — NURSING
OOB ambulatory around the room.  Pt reports having a BM with no blood noted.  Report called to [facility] RN.  Pt to transfer to [facility] tele. Via wheelchair.  VSS.

## 2019-10-24 NOTE — PROGRESS NOTES
[facility] - ICU Hospital Medicine  Progress Note    Patient Name: Nilton Sherwood  MRN: [id number]  Patient Class: IP- Inpatient   Admission Date: 10/21/2019  Length of Stay: 1 days  Attending Physician: Sha Merrill III, MD  Primary Care Provider: Sha Merrill III, MD        Subjective:     Principal Problem:History of GI diverticular bleed        HPI:  No notes on file    Overview/Hospital Course:  No notes on file    Interval History: seen at 0900  Hungry  No bms  No complaints    Review of Systems   Constitutional: Negative.    Cardiovascular: Negative.    Gastrointestinal: Negative.    Hematological: Negative.    All other systems reviewed and are negative.    Objective:     Vital Signs (Most Recent):  Temp: 98 °F (36.7 °C) (10/23/19 1907)  Pulse: 60 (10/23/19 1907)  Resp: 14 (10/23/19 1907)  BP: 117/62 (10/23/19 1907)  SpO2: 98 % (10/23/19 0915) Vital Signs (24h Range):  Temp:  [97.9 °F (36.6 °C)-98.5 °F (36.9 °C)] 98 °F (36.7 °C)  Pulse:  [60-74] 60  Resp:  [9-25] 14  SpO2:  [94 %-98 %] 98 %  BP: ()/(37-66) 117/62     Weight: 63.5 kg (140 lb)  Body mass index is 21.93 kg/m².    Intake/Output Summary (Last 24 hours) at 10/23/2019 1935  Last data filed at 10/23/2019 1300  Gross per 24 hour   Intake 3595 ml   Output 2130 ml   Net 1465 ml      Physical Exam   Constitutional: He is oriented to person, place, and time. Vital signs are normal. He appears well-developed and well-nourished. He is active.   HENT:   Head: Normocephalic and atraumatic.   Eyes: Pupils are equal, round, and reactive to light. Conjunctivae are normal.   Neck: Normal range of motion. Neck supple. No thyromegaly present.   Cardiovascular: Normal rate, regular rhythm and normal heart sounds.   Pulmonary/Chest: Effort normal and breath sounds normal.   Abdominal: Soft. Normal appearance and bowel sounds are normal. There is no tenderness.   Musculoskeletal: Normal range of motion.   Neurological: He is alert and  oriented to person, place, and time.   Skin: Skin is warm and dry.   Psychiatric: He has a normal mood and affect. His behavior is normal. Judgment and thought content normal.   Nursing note and vitals reviewed.      Significant Labs:   Recent Lab Results       10/23/19  1335   10/23/19  0441   10/22/19  2311        Baso # 0.04 0.06 0.04          0.04     Basophil% 0.6 0.7 0.5          0.5     Differential Method Automated Automated Automated          Automated     Eos # 0.8 1.0 0.8          0.8     Eosinophil% 11.5 11.4 9.6          9.6     Gran # (ANC) 5.0 5.8 6.0          6.0     Gran% 70.1 68.9 71.7          71.7     Hematocrit 28.4 28.5 28.2          28.2     Hemoglobin 9.4 9.5 9.6          9.6     Immature Grans (Abs) 0.02  Comment:  Mild elevation in immature granulocytes is non specific and   can be seen in a variety of conditions including stress response,   acute inflammation, trauma and pregnancy. Correlation with other   laboratory and clinical findings is essential.   0.04  Comment:  Mild elevation in immature granulocytes is non specific and   can be seen in a variety of conditions including stress response,   acute inflammation, trauma and pregnancy. Correlation with other   laboratory and clinical findings is essential.   0.02  Comment:  Mild elevation in immature granulocytes is non specific and   can be seen in a variety of conditions including stress response,   acute inflammation, trauma and pregnancy. Correlation with other   laboratory and clinical findings is essential.            0.02  Comment:  Mild elevation in immature granulocytes is non specific and   can be seen in a variety of conditions including stress response,   acute inflammation, trauma and pregnancy. Correlation with other   laboratory and clinical findings is essential.       Immature Granulocytes 0.3 0.5 0.2          0.2     Lymph # 0.8 1.0 0.9          0.9     Lymph% 11.5 11.2 11.2          11.2     MCH 29.7 29.8 30.1           30.1     MCHC 33.1 33.3 34.0          34.0     MCV 90 89 88          88     Mono # 0.4 0.6 0.6          0.6     Mono% 6.0 7.3 6.8          6.8     MPV 10.8 11.2 11.0          11.0     nRBC 0 0 0          0     Platelets 164 168 171          171     RBC 3.16 3.19 3.19          3.19     RDW 14.2 14.0 13.9          13.9     WBC 7.14 8.45 8.42          8.42           Significant Imaging: I have reviewed and interpreted all pertinent imaging results/findings within the past 24 hours.      Assessment/Plan:      Acute lower gastrointestinal hemorrhage  10.21  Admit  Follow hb  Consult surgery  Pt had cardiac stent several years ago  Stop anticoag    10.22  Now s/p colonoscopy  Hungry  Continue support including blood and products  Stop anticoagulation    10.23  Much improved  No bleeding overnight  Continue supportive care      VTE Risk Mitigation (From admission, onward)         Ordered     IP VTE LOW RISK PATIENT  Once      10/21/19 1241     Place JOANNA hose  Until discontinued      10/21/19 1241                      Sha Merrill III, MD  Department of Hospital Medicine   [facility]

## 2019-10-24 NOTE — ASSESSMENT & PLAN NOTE
10.21  Admit  Follow hb  Consult surgery  Pt had cardiac stent several years ago  Stop anticoag    10.22  Now s/p colonoscopy  Hungry  Continue support including blood and products  Stop anticoagulation    10.23  Much improved  No bleeding overnight  Continue supportive care    10.23  Improving  Transfer to floor  Increase kiersten

## 2019-10-24 NOTE — SUBJECTIVE & OBJECTIVE
Interval History: seen at 0900  Hungry  No bms  No complaints    Review of Systems   Constitutional: Negative.    Cardiovascular: Negative.    Gastrointestinal: Negative.    Hematological: Negative.    All other systems reviewed and are negative.    Objective:     Vital Signs (Most Recent):  Temp: 98 °F (36.7 °C) (10/23/19 1907)  Pulse: 60 (10/23/19 1907)  Resp: 14 (10/23/19 1907)  BP: 117/62 (10/23/19 1907)  SpO2: 98 % (10/23/19 0915) Vital Signs (24h Range):  Temp:  [97.9 °F (36.6 °C)-98.5 °F (36.9 °C)] 98 °F (36.7 °C)  Pulse:  [60-74] 60  Resp:  [9-25] 14  SpO2:  [94 %-98 %] 98 %  BP: ()/(37-66) 117/62     Weight: 63.5 kg (140 lb)  Body mass index is 21.93 kg/m².    Intake/Output Summary (Last 24 hours) at 10/23/2019 1935  Last data filed at 10/23/2019 1300  Gross per 24 hour   Intake 3595 ml   Output 2130 ml   Net 1465 ml      Physical Exam   Constitutional: He is oriented to person, place, and time. Vital signs are normal. He appears well-developed and well-nourished. He is active.   HENT:   Head: Normocephalic and atraumatic.   Eyes: Pupils are equal, round, and reactive to light. Conjunctivae are normal.   Neck: Normal range of motion. Neck supple. No thyromegaly present.   Cardiovascular: Normal rate, regular rhythm and normal heart sounds.   Pulmonary/Chest: Effort normal and breath sounds normal.   Abdominal: Soft. Normal appearance and bowel sounds are normal. There is no tenderness.   Musculoskeletal: Normal range of motion.   Neurological: He is alert and oriented to person, place, and time.   Skin: Skin is warm and dry.   Psychiatric: He has a normal mood and affect. His behavior is normal. Judgment and thought content normal.   Nursing note and vitals reviewed.      Significant Labs:   Recent Lab Results       10/23/19  1335   10/23/19  0441   10/22/19  2311        Baso # 0.04 0.06 0.04          0.04     Basophil% 0.6 0.7 0.5          0.5     Differential Method Automated Automated Automated           Automated     Eos # 0.8 1.0 0.8          0.8     Eosinophil% 11.5 11.4 9.6          9.6     Gran # (ANC) 5.0 5.8 6.0          6.0     Gran% 70.1 68.9 71.7          71.7     Hematocrit 28.4 28.5 28.2          28.2     Hemoglobin 9.4 9.5 9.6          9.6     Immature Grans (Abs) 0.02  Comment:  Mild elevation in immature granulocytes is non specific and   can be seen in a variety of conditions including stress response,   acute inflammation, trauma and pregnancy. Correlation with other   laboratory and clinical findings is essential.   0.04  Comment:  Mild elevation in immature granulocytes is non specific and   can be seen in a variety of conditions including stress response,   acute inflammation, trauma and pregnancy. Correlation with other   laboratory and clinical findings is essential.   0.02  Comment:  Mild elevation in immature granulocytes is non specific and   can be seen in a variety of conditions including stress response,   acute inflammation, trauma and pregnancy. Correlation with other   laboratory and clinical findings is essential.            0.02  Comment:  Mild elevation in immature granulocytes is non specific and   can be seen in a variety of conditions including stress response,   acute inflammation, trauma and pregnancy. Correlation with other   laboratory and clinical findings is essential.       Immature Granulocytes 0.3 0.5 0.2          0.2     Lymph # 0.8 1.0 0.9          0.9     Lymph% 11.5 11.2 11.2          11.2     MCH 29.7 29.8 30.1          30.1     MCHC 33.1 33.3 34.0          34.0     MCV 90 89 88          88     Mono # 0.4 0.6 0.6          0.6     Mono% 6.0 7.3 6.8          6.8     MPV 10.8 11.2 11.0          11.0     nRBC 0 0 0          0     Platelets 164 168 171          171     RBC 3.16 3.19 3.19          3.19     RDW 14.2 14.0 13.9          13.9     WBC 7.14 8.45 8.42          8.42           Significant Imaging: I have reviewed and interpreted all pertinent imaging  results/findings within the past 24 hours.

## 2019-10-24 NOTE — ASSESSMENT & PLAN NOTE
10.21  Admit  Follow hb  Consult surgery  Pt had cardiac stent several years ago  Stop anticoag    10.22  Now s/p colonoscopy  Hungry  Continue support including blood and products  Stop anticoagulation    10.23  Much improved  No bleeding overnight  Continue supportive care

## 2019-10-24 NOTE — PROGRESS NOTES
[facility] - ICU  General Surgery  Daily Note    Patient Name: Nilton Sherwood  MRN: [id number]  Admission Date: 10/21/2019  Attending Physician: Sha Merrill III, MD   Consult Physician: Jerad Jackson MD  Primary Care Provider: Sha Merrill III, MD    Subjective:     Principle Problem: GI Bleed    Last 24 hour history:  10/22/19  Afebrile.  Vital signs stable.  Continued bleeding per rectum overnight.  No nausea vomiting.  Bleeding bright red per the patient's account.  Will plan to obtain 2 additional units of red blood cells and transfuse.  Make platelets available.  Proceed urgently this morning with colonoscopy, possible EGD.    10/23/19  No issues since colonoscopy yesterday.  No recurrent bleeding.  H/H Stable.  No nausea or vomiting.  Patient hungry.  No new complaints.    10/24/19  Afebrile.  Vital signs stable.  H&H stable.  No recurrent bleeding. No nausea vomiting.  Patient hungry.  No new complaints.    Objective:     Vital Signs (Most Recent):  Temp: 96.9 °F (36.1 °C) (10/24/19 1505)  Pulse: 65 (10/24/19 1505)  Resp: 18 (10/24/19 1505)  BP: 127/62 (10/24/19 1505)  SpO2: 96 % (10/24/19 1505) Vital Signs (24h Range):  Temp:  [96.9 °F (36.1 °C)-98.3 °F (36.8 °C)] 96.9 °F (36.1 °C)  Pulse:  [60-66] 65  Resp:  [10-27] 18  SpO2:  [92 %-100 %] 96 %  BP: (106-137)/(58-72) 127/62     Intake/Output last 24 hours:    Intake/Output Summary (Last 24 hours) at 10/24/2019 1717  Last data filed at 10/24/2019 1500  Gross per 24 hour   Intake 2819.17 ml   Output 2201 ml   Net 618.17 ml       I/O last 3 completed shifts:  In: 6174.2 [P.O.:1820; I.V.:4354.2]  Out: 4130 [Urine:4130]  I/O this shift:  In: 240 [P.O.:240]  Out: 201 [Urine:200; Stool:1]    Weight: 63.5 kg (140 lb)  Body mass index is 21.93 kg/m².    Gen: Wd Wn male currently in NAD  Heent: Nc/At, MMM  Eyes: Perrl, Eomi  Cv: RRR, no  M/g/r  Lung: Non-labored breathing, clear bilaterally  Abd: Soft, non-tender,  non-distended    Significant Labs:  CBC:   Recent Labs   Lab 10/24/19  0415   WBC 9.12   RBC 3.10*   HGB 9.2*   HCT 27.4*      MCV 88   MCH 29.7   MCHC 33.6     BMP:   Recent Labs   Lab 10/24/19  0415   *      K 3.3*   *   CO2 17*   BUN 6*   CREATININE 0.6   CALCIUM 7.7*     CMP:   Recent Labs   Lab 10/21/19  1005 10/24/19  0415   * 122*   CALCIUM 8.9 7.7*   ALBUMIN 3.7  --    PROT 6.2  --     144   K 4.6 3.3*   CO2 26 17*    115*   BUN 15 6*   CREATININE 0.8 0.6   ALKPHOS 61  --    ALT 17  --    AST 14  --    BILITOT 0.9  --      LFTs:   Recent Labs   Lab 10/21/19  1005   ALT 17   AST 14   ALKPHOS 61   BILITOT 0.9   PROT 6.2   ALBUMIN 3.7     Coagulation:   Recent Labs   Lab 10/21/19  1005   LABPROT 12.9*   INR 1.2     Specimen (12h ago, onward)    None        No results for input(s): COLORU, CLARITYU, SPECGRAV, PHUR, PROTEINUA, GLUCOSEU, BILIRUBINCON, BLOODU, WBCU, RBCU, BACTERIA, MUCUS, NITRITE, LEUKOCYTESUR, UROBILINOGEN, HYALINECASTS in the last 168 hours.    Cultures:    Microbiology Results (last 7 days)     ** No results found for the last 168 hours. **          Assessment:   Nilton Sherwood is a 74 y.o. male who presents with Divercular bleed.    Active Diagnoses:    Diagnosis Date Noted POA    PRINCIPAL PROBLEM:  History of GI diverticular bleed [Z87.19] 10/23/2019 Not Applicable    Acute lower gastrointestinal hemorrhage [K92.2] 10/21/2019 Yes    Acute blood loss anemia [D62] 10/21/2019 Yes      Problems Resolved During this Admission:     VTE Risk Mitigation (From admission, onward)         Ordered     IP VTE LOW RISK PATIENT  Once      10/21/19 1241     Place JOANNA hose  Until discontinued      10/21/19 1241                Medical Decision Making/Plan:  No recurrent bleeding.  H/H Stable. Reg diet. Call surgery with questions.    Jerad Jackson MD  General Surgery  [facility] - ICU

## 2019-10-24 NOTE — SUBJECTIVE & OBJECTIVE
Interval History: no bm's  No bleeding  Tolerating po    Review of Systems   Constitutional: Negative.    HENT: Negative.    Respiratory: Negative.    Hematological: Negative.    All other systems reviewed and are negative.    Objective:     Vital Signs (Most Recent):  Temp: 98.2 °F (36.8 °C) (10/24/19 0400)  Pulse: 61 (10/24/19 0600)  Resp: (!) 27 (10/24/19 0600)  BP: 130/64 (10/24/19 0400)  SpO2: (!) 93 % (10/24/19 0600) Vital Signs (24h Range):  Temp:  [97.8 °F (36.6 °C)-98.2 °F (36.8 °C)] 98.2 °F (36.8 °C)  Pulse:  [60-64] 61  Resp:  [11-27] 27  SpO2:  [92 %-97 %] 93 %  BP: (106-137)/(58-67) 130/64     Weight: 63.5 kg (140 lb)  Body mass index is 21.93 kg/m².    Intake/Output Summary (Last 24 hours) at 10/24/2019 1036  Last data filed at 10/24/2019 0700  Gross per 24 hour   Intake 3734.17 ml   Output 2700 ml   Net 1034.17 ml      Physical Exam   Constitutional: He is oriented to person, place, and time. Vital signs are normal. He appears well-developed and well-nourished. He is active.   HENT:   Head: Normocephalic and atraumatic.   Eyes: Pupils are equal, round, and reactive to light. Conjunctivae are normal.   Neck: Normal range of motion. Neck supple. No thyromegaly present.   Cardiovascular: Normal rate, regular rhythm and normal heart sounds.   Pulmonary/Chest: Effort normal and breath sounds normal.   Abdominal: Soft. Normal appearance and bowel sounds are normal. There is no tenderness.   Musculoskeletal: Normal range of motion.   Neurological: He is alert and oriented to person, place, and time.   Skin: Skin is warm and dry.   Psychiatric: He has a normal mood and affect. His behavior is normal. Judgment and thought content normal.   Nursing note and vitals reviewed.      Significant Labs:   Recent Lab Results       10/24/19  0415   10/23/19  1335        Anion Gap 12       Baso # 0.04 0.04     Basophil% 0.4 0.6     BUN, Bld 6       Calcium 7.7       Chloride 115       CO2 17       Creatinine 0.6        Differential Method Automated Automated     eGFR if  >60.0       eGFR if non  >60.0  Comment:  Calculation used to obtain the estimated glomerular filtration  rate (eGFR) is the CKD-EPI equation.          Eos # 0.9 0.8     Eosinophil% 10.1 11.5     Glucose 122       Gran # (ANC) 6.5 5.0     Gran% 71.3 70.1     Hematocrit 27.4 28.4     Hemoglobin 9.2 9.4     Immature Grans (Abs) 0.03  Comment:  Mild elevation in immature granulocytes is non specific and   can be seen in a variety of conditions including stress response,   acute inflammation, trauma and pregnancy. Correlation with other   laboratory and clinical findings is essential.   0.02  Comment:  Mild elevation in immature granulocytes is non specific and   can be seen in a variety of conditions including stress response,   acute inflammation, trauma and pregnancy. Correlation with other   laboratory and clinical findings is essential.       Immature Granulocytes 0.3 0.3     Lymph # 0.8 0.8     Lymph% 8.3 11.5     MCH 29.7 29.7     MCHC 33.6 33.1     MCV 88 90     Mono # 0.9 0.4     Mono% 9.6 6.0     MPV 11.2 10.8     nRBC 0 0     Platelets 177 164     Potassium 3.3       RBC 3.10 3.16     RDW 14.1 14.2     Sodium 144       WBC 9.12 7.14           Significant Imaging: I have reviewed and interpreted all pertinent imaging results/findings within the past 24 hours.

## 2019-10-24 NOTE — NURSING
Report received.  Assessment done. VSS.  Awake and alert. No active bleeding noted.  C/O hunger.  See flowsheet for further assessment.

## 2019-10-24 NOTE — PLAN OF CARE
Patient stable overnight.  No episodes of bleeding noted.  Rested well and denies new complaints.  Eager to eat a full meal.  IVF continue to infuse at 125 ml/hr.  Afebrile.  Skin intact without issues.  No falls or injuries this shift.

## 2019-10-24 NOTE — PLAN OF CARE
10/24/19 1240   Discharge Reassessment   Assessment Type Discharge Planning Reassessment   Anticipated Discharge Disposition Home   Provided patient/caregiver education on the expected discharge date and the discharge plan Yes   Do you have any problems affording any of your prescribed medications? No   Discharge Plan A Home   DME Needed Upon Discharge  none   Patient choice form signed by patient/caregiver N/A        10/24/19 1240   Discharge Reassessment   Assessment Type Discharge Planning Reassessment   Anticipated Discharge Disposition Home   Provided patient/caregiver education on the expected discharge date and the discharge plan Yes   Do you have any problems affording any of your prescribed medications? No   Discharge Plan A Home   DME Needed Upon Discharge  none   Patient choice form signed by patient/caregiver N/A   Patient states is supposed to go home tomorrow. His friend will be picking him up. States he won't need any help at home when discharged. Will continue to follow for needs.

## 2019-10-25 ENCOUNTER — TELEPHONE (OUTPATIENT)
Dept: SURGERY | Facility: CLINIC | Age: 74
End: 2019-10-25

## 2019-10-25 VITALS
RESPIRATION RATE: 16 BRPM | BODY MASS INDEX: 21.97 KG/M2 | DIASTOLIC BLOOD PRESSURE: 82 MMHG | SYSTOLIC BLOOD PRESSURE: 132 MMHG | WEIGHT: 140 LBS | HEIGHT: 67 IN | TEMPERATURE: 98 F | HEART RATE: 62 BPM | OXYGEN SATURATION: 96 %

## 2019-10-25 PROCEDURE — 25000003 PHARM REV CODE 250: Performed by: SURGERY

## 2019-10-25 RX ADMIN — CARVEDILOL 6.25 MG: 3.12 TABLET, FILM COATED ORAL at 08:10

## 2019-10-25 RX ADMIN — PANTOPRAZOLE SODIUM 40 MG: 40 TABLET, DELAYED RELEASE ORAL at 08:10

## 2019-10-25 NOTE — PLAN OF CARE
Problem: Adult Inpatient Plan of Care  Goal: Plan of Care Review  Outcome: Ongoing, Progressing     Problem: Fall Injury Risk  Goal: Absence of Fall and Fall-Related Injury  Outcome: Ongoing, Progressing     Problem: Bleeding (Surgery Nonspecified)  Goal: Absence of Bleeding  Outcome: Ongoing, Progressing     Problem: Ongoing Anesthesia Effects (Surgery Nonspecified)  Goal: Anesthesia/Sedation Recovery  Outcome: Ongoing, Progressing     Problem: Postoperative Nausea and Vomiting (Surgery Nonspecified)  Goal: Nausea and Vomiting Relief  Outcome: Ongoing, Progressing

## 2019-10-25 NOTE — TELEPHONE ENCOUNTER
----- Message from Ej Mueller sent at 10/25/2019  9:40 AM CDT -----  Contact: [facility] [phone]  Type: Needs Medical Advice    Who Called: [facility]  [phone]    Symptoms (please be specific):  Needs a one week hosp F/U am appmnt.    How long has patient had these symptoms:  Discharging 10-25-19    Best Call Back Number: [phone]    Additional Information: Please call to schedule.

## 2019-10-25 NOTE — ASSESSMENT & PLAN NOTE
10.21  Admit  Follow hb  Consult surgery  Pt had cardiac stent several years ago  Stop anticoag    10.22  Now s/p colonoscopy  Hungry  Continue support including blood and products  Stop anticoagulation    10.23  Much improved  No bleeding overnight  Continue supportive care    10.23  Improving  Transfer to floor  Increase kiersten    10.25  Admitted to icu with diverticular bleed.  anticoag stopped  More that a year since stent placed.  Required multiple transfusions  Had colonoscopy  Tolerated conservative treatment  Bleeding resolved  Sitting in chair  Exam at baseline  Wants to go home  Tolerating po  No more hematochezia  Ap: d/c home  Stay off asa/plavix  F/u with me next week  To er if bleeding recurs

## 2019-10-25 NOTE — NURSING
Patient refuses bed alarm at this time. Instructed on fall risks/ ways to decrease fall risk is with use of bed alarm. Patient still refuses bed alarm activation. Instructed on use of call light before any ambulation attempts. Patient voices understanding. Call light in reach. Reinforced again to use call light to call staff. Patient agrees verbally.

## 2019-10-25 NOTE — PLAN OF CARE
10/21/19 1429   Final Note   Assessment Type Discharge Planning Assessment   Anticipated Discharge Disposition Home   What phone number can be called within the next 1-3 days to see how you are doing after discharge? [phone]   Hospital Follow Up  Appt(s) scheduled? Yes   Discharge plans and expectations educations in teach back method with documentation complete? Yes     Patient being discharged home. Follow up appointments given verbally and written for Dr Merrill and Dr Jackson's office to call patient with appointment. Instructed on paperwork given on s/s to report to MD. Demonstrated instructions by verbal feedback.

## 2019-10-25 NOTE — DISCHARGE SUMMARY
[facility] Hospital Medicine  Discharge Summary      Patient Name: Nilton Sherwood  MRN: [id number]  Admission Date: 10/21/2019  Hospital Length of Stay: 3 days  Discharge Date and Time: No discharge date for patient encounter.  Attending Physician: Sha Merrill III, MD   Discharging Provider: Sha Merrill III, MD  Primary Care Provider: Sha Merrill III, MD      HPI:   No notes on file    Procedure(s) (LRB):  COLONOSCOPY (N/A)      Hospital Course:   10.25  Admitted to icu with diverticular bleed.  anticoag stopped  More that a year since stent placed.  Required multiple transfusions  Had colonoscopy  Tolerated conservative treatment  Bleeding resolved  Sitting in chair  Exam at baseline  Wants to go home  Tolerating po  No more hematochezia  Ap: d/c home  Stay off asa/plavix  F/u with me next week  To er if bleeding recurs     Consults:   Consults (From admission, onward)        Status Ordering Provider     Inpatient consult to General Surgery  Once     Provider:  Jerad Jackson MD    Completed PETTY METCALF          * Acute lower gastrointestinal hemorrhage  10.21  Admit  Follow hb  Consult surgery  Pt had cardiac stent several years ago  Stop anticoag    10.22  Now s/p colonoscopy  Hungry  Continue support including blood and products  Stop anticoagulation    10.23  Much improved  No bleeding overnight  Continue supportive care    10.23  Improving  Transfer to floor  Increase kiersten    10.25  Admitted to icu with diverticular bleed.  anticoag stopped  More that a year since stent placed.  Required multiple transfusions  Had colonoscopy  Tolerated conservative treatment  Bleeding resolved  Sitting in chair  Exam at baseline  Wants to go home  Tolerating po  No more hematochezia  Ap: d/c home  Stay off asa/plavix  F/u with me next week  To er if bleeding recurs    History of GI diverticular bleed        Acute blood loss anemia            Please note that yesterday's  progress note mis dated.      Final Active Diagnoses:    Diagnosis Date Noted POA    PRINCIPAL PROBLEM:  Acute lower gastrointestinal hemorrhage [K92.2] 10/21/2019 Yes    History of GI diverticular bleed [Z87.19] 10/23/2019 Not Applicable    Acute blood loss anemia [D62] 10/21/2019 Yes      Problems Resolved During this Admission:       Discharged Condition: fair    Disposition:     Follow Up:    Patient Instructions:   No discharge procedures on file.    Significant Diagnostic Studies: Labs: All labs within the past 24 hours have been reviewed    Pending Diagnostic Studies:     None         Medications:  Reconciled Home Medications:      Medication List      CONTINUE taking these medications    atorvastatin 40 MG tablet  Commonly known as:  LIPITOR  Take 40 mg by mouth once daily.     carvedilol 6.25 MG tablet  Commonly known as:  COREG  Take 1 tablet (6.25 mg total) by mouth 2 (two) times daily.     cyanocobalamin (vitamin B-12) 1,000 mcg Tbsr  Take 1,000 mcg by mouth once daily.     doxazosin 1 MG tablet  Commonly known as:  CARDURA  Take 1 mg by mouth every evening.     lisinopril 2.5 MG tablet  Commonly known as:  PRINIVIL,ZESTRIL  Take 1 tablet (2.5 mg total) by mouth once daily.     metFORMIN 1000 MG tablet  Commonly known as:  GLUCOPHAGE  Take 1 tablet (1,000 mg total) by mouth 2 (two) times daily with meals.     pantoprazole 40 MG tablet  Commonly known as:  PROTONIX  Take 1 tablet (40 mg total) by mouth once daily.        STOP taking these medications    aspirin 81 MG EC tablet  Commonly known as:  ECOTRIN     clopidogrel 75 mg tablet  Commonly known as:  PLAVIX            Indwelling Lines/Drains at time of discharge:   Lines/Drains/Airways     Airway                 Airway - Non-Surgical 10/22/19 0726 Other (Comment) 3 days                Time spent on the discharge of patient: 30 minutes  Patient was seen and examined on the date of discharge and determined to be suitable for discharge.         Sha  Desirae URENA MD  Department of Hospital Medicine  [facility]

## 2019-10-25 NOTE — HOSPITAL COURSE
10.25  Admitted to icu with diverticular bleed.  anticoag stopped  More that a year since stent placed.  Required multiple transfusions  Had colonoscopy  Tolerated conservative treatment  Bleeding resolved  Sitting in chair  Exam at baseline  Wants to go home  Tolerating po  No more hematochezia  Ap: d/c home  Stay off asa/plavix  F/u with me next week  To er if bleeding recurs

## 2019-10-25 NOTE — PLAN OF CARE
10/24/19 1240   Medicare Message   Important Message from Medicare regarding Discharge Appeal Rights Given to patient/caregiver;Explained to patient/caregiver;Signed/date by patient/caregiver   Date IMM was signed 10/24/19   Time IMM was signed 124

## 2019-10-25 NOTE — TELEPHONE ENCOUNTER
Phoned pt.  Advised pt that he needs a 1 week follow up appointment with Dr. Jackson following his hospital stay.  Scheduled pt to see Dr. Jackson on Friday, 11-1-19 at 9:45 am.  Instructed pt to call the office for any further questions/concerns.  Pt verbalizes understanding of all instructions.

## 2019-10-25 NOTE — NURSING
950-DISCHARGE INSTRUCTIONS REVIEWED WITH PT. PT VOICED UNDERSTANDING. ANGELARN  2831- ASSISTED PT TO TRANSPORTATION. PT REFUSED W/C. GAIT NOTED STEADY. RESP EVEN AND UNLABORED. NAD NOTED. NO FURTHER NEEDS NOTED. ANGELARN

## 2019-11-01 ENCOUNTER — OFFICE VISIT (OUTPATIENT)
Dept: SURGERY | Facility: CLINIC | Age: 74
End: 2019-11-01
Payer: MEDICARE

## 2019-11-01 VITALS
HEART RATE: 67 BPM | SYSTOLIC BLOOD PRESSURE: 136 MMHG | WEIGHT: 141.31 LBS | BODY MASS INDEX: 22.18 KG/M2 | OXYGEN SATURATION: 97 % | DIASTOLIC BLOOD PRESSURE: 71 MMHG | TEMPERATURE: 98 F | RESPIRATION RATE: 14 BRPM | HEIGHT: 67 IN

## 2019-11-01 DIAGNOSIS — K29.70 GASTRITIS, PRESENCE OF BLEEDING UNSPECIFIED, UNSPECIFIED CHRONICITY, UNSPECIFIED GASTRITIS TYPE: Primary | ICD-10-CM

## 2019-11-01 DIAGNOSIS — K57.90 DIVERTICULOSIS: ICD-10-CM

## 2019-11-01 DIAGNOSIS — D12.6 TUBULAR ADENOMA OF COLON: ICD-10-CM

## 2019-11-01 PROCEDURE — 99213 PR OFFICE/OUTPT VISIT, EST, LEVL III, 20-29 MIN: ICD-10-PCS | Mod: S$GLB,,, | Performed by: SURGERY

## 2019-11-01 PROCEDURE — 1101F PR PT FALLS ASSESS DOC 0-1 FALLS W/OUT INJ PAST YR: ICD-10-PCS | Mod: CPTII,S$GLB,, | Performed by: SURGERY

## 2019-11-01 PROCEDURE — 99213 OFFICE O/P EST LOW 20 MIN: CPT | Mod: S$GLB,,, | Performed by: SURGERY

## 2019-11-01 PROCEDURE — 1101F PT FALLS ASSESS-DOCD LE1/YR: CPT | Mod: CPTII,S$GLB,, | Performed by: SURGERY

## 2019-11-01 NOTE — PROGRESS NOTES
"[facility] General Surgery  Follow-up    Subjective:       Patient ID: Nilton Sherwood is a 74 y.o. male.    Chief Complaint: Follow-up (1 week f/u - hospital stay)      HPI:  Nilton Sherwood is a 74 y.o. male presents today for follow-up examination after EGD and colonoscopy.  Patient after EGD and colonoscopy over 2 weeks later had a lower gastrointestinal bleed.  Bleeding was consistent with diverticular origin.  Patient subsequently had blood products administered and was discharged home after bleeding stopped.  Patient since this time has done well.  He now presents today for follow-up of EGD and colonoscopy results.  No new issues or complaints.    Review of Systems   Constitutional: Negative for appetite change, chills and fever.   HENT: Negative for congestion, dental problem and drooling.    Eyes: Negative for photophobia, discharge and itching.   Respiratory: Negative for apnea and chest tightness.    Cardiovascular: Negative for chest pain, palpitations and leg swelling.   Gastrointestinal: Negative for abdominal distention and abdominal pain.   Endocrine: Negative for cold intolerance and heat intolerance.   Genitourinary: Negative for difficulty urinating and dysuria.   Musculoskeletal: Negative for arthralgias and back pain.   Skin: Negative for color change and pallor.   Neurological: Negative for dizziness, facial asymmetry and headaches.   Hematological: Negative for adenopathy. Does not bruise/bleed easily.   Psychiatric/Behavioral: Negative for agitation, behavioral problems and confusion.       Objective:      Vitals:    11/01/19 0941   BP: 136/71   BP Location: Left arm   Patient Position: Sitting   BP Method: Large (Automatic)   Pulse: 67   Resp: 14   Temp: 97.9 °F (36.6 °C)   TempSrc: Oral   SpO2: 97%   Weight: 64.1 kg (141 lb 5 oz)   Height: 5' 7" (1.702 m)     Physical Exam   Constitutional: He is oriented to person, place, and time. He appears well-developed and well-nourished. "   HENT:   Head: Normocephalic and atraumatic.   Eyes: Pupils are equal, round, and reactive to light. EOM are normal.   Neck: Normal range of motion. Neck supple. No thyromegaly present.   Cardiovascular: Normal rate and regular rhythm.   No murmur heard.  Pulmonary/Chest: Effort normal and breath sounds normal. No respiratory distress.   Abdominal: Soft. Bowel sounds are normal. He exhibits no distension. There is no tenderness.   Musculoskeletal: Normal range of motion. He exhibits no edema.   Neurological: He is alert and oriented to person, place, and time. No cranial nerve deficit.   Skin: Skin is warm. Capillary refill takes less than 2 seconds. No rash noted. He is not diaphoretic. No erythema.   Psychiatric: He has a normal mood and affect.        Assessment:       1. Gastritis, presence of bleeding unspecified, unspecified chronicity, unspecified gastritis type    2. Tubular adenoma of colon    3. Diverticulosis        Plan:   Gastritis, presence of bleeding unspecified, unspecified chronicity, unspecified gastritis type    Tubular adenoma of colon    Diverticulosis        Medical Decision Making/Counseling:  Established patient. Multiple issues.    Gastritis.  New diagnosis.  Small ulcerations.  Biopsy consistent with gastritis.  No evidence of Helicobacter pylori infection.  Negative for Claudio's esophagus on biopsy.  Recommendations at this point will be for continued Protonix therapy for least 6 months repeat EGD time next colonoscopy otherwise before is indicated.    Diverticulosis.  Patient with post colonoscopy diverticular bleed.  Patient informed the risk benefits of diverticulosis to include diverticular bleeds versus diverticulitis.  Patient informed of the red flag symptoms associated with each.  Recommendation was for high-fiber diet today given his diverticulosis to improve regularity of bowel movements decrease the risk of diverticular complications.    Tubular adenoma the colon.  Patient  with multiple adenomatous colon polyps, 5, resected.  Given multiple adenomatous colon polyps, recommendation will be repeat colonoscopy in 3 years.  Patient voiced understanding, agreement plan of care.    Follow up:  Three years for repeat EGD and colonoscopy.    Patient instructed that best way to communicate with my office staff is for patient to get on the [facility] patient portal to expedite communication and communication issues that may occur.  Patient was given instructions on how to get on the portal.  I encouraged patient to obtain portal access as well.  Ultimately it is up to the patient to obtain access.  Patient voiced understanding.

## 2019-11-19 ENCOUNTER — OFFICE VISIT (OUTPATIENT)
Dept: RADIATION ONCOLOGY | Facility: CLINIC | Age: 74
End: 2019-11-19
Payer: MEDICARE

## 2019-11-19 ENCOUNTER — DOCUMENTATION ONLY (OUTPATIENT)
Dept: HEMATOLOGY/ONCOLOGY | Facility: CLINIC | Age: 74
End: 2019-11-19

## 2019-11-19 VITALS
HEART RATE: 64 BPM | OXYGEN SATURATION: 99 % | SYSTOLIC BLOOD PRESSURE: 157 MMHG | WEIGHT: 138.81 LBS | DIASTOLIC BLOOD PRESSURE: 83 MMHG | BODY MASS INDEX: 21.74 KG/M2

## 2019-11-19 DIAGNOSIS — C61 PROSTATE CANCER: Primary | ICD-10-CM

## 2019-11-19 PROCEDURE — 1111F PR DISCHARGE MEDS RECONCILED W/ CURRENT OUTPATIENT MED LIST: ICD-10-PCS | Mod: S$GLB,,, | Performed by: RADIOLOGY

## 2019-11-19 PROCEDURE — 1101F PT FALLS ASSESS-DOCD LE1/YR: CPT | Mod: S$GLB,,, | Performed by: RADIOLOGY

## 2019-11-19 PROCEDURE — 1126F AMNT PAIN NOTED NONE PRSNT: CPT | Mod: S$GLB,,, | Performed by: RADIOLOGY

## 2019-11-19 PROCEDURE — 99213 PR OFFICE/OUTPT VISIT, EST, LEVL III, 20-29 MIN: ICD-10-PCS | Mod: S$GLB,,, | Performed by: RADIOLOGY

## 2019-11-19 PROCEDURE — 1126F PR PAIN SEVERITY QUANTIFIED, NO PAIN PRESENT: ICD-10-PCS | Mod: S$GLB,,, | Performed by: RADIOLOGY

## 2019-11-19 PROCEDURE — 1111F DSCHRG MED/CURRENT MED MERGE: CPT | Mod: S$GLB,,, | Performed by: RADIOLOGY

## 2019-11-19 PROCEDURE — 1159F PR MEDICATION LIST DOCUMENTED IN MEDICAL RECORD: ICD-10-PCS | Mod: S$GLB,,, | Performed by: RADIOLOGY

## 2019-11-19 PROCEDURE — 1159F MED LIST DOCD IN RCRD: CPT | Mod: S$GLB,,, | Performed by: RADIOLOGY

## 2019-11-19 PROCEDURE — 99213 OFFICE O/P EST LOW 20 MIN: CPT | Mod: S$GLB,,, | Performed by: RADIOLOGY

## 2019-11-19 PROCEDURE — 1101F PR PT FALLS ASSESS DOC 0-1 FALLS W/OUT INJ PAST YR: ICD-10-PCS | Mod: S$GLB,,, | Performed by: RADIOLOGY

## 2019-11-19 NOTE — PROGRESS NOTES
Nilton Sherwood  [id number]  1945 11/19/2019  Narinder Her Md  [address] [place] [address] Dr  [address]  [place] [address]    DIAGNOSIS: Cancer Staging  Prostate cancer  Staging form: Prostate, AJCC 8th Edition  - Clinical: Stage IIA (cT2a, cN0, cM0, PSA: 10.5, Grade Group: 1) - Signed by Slick Garcia Jr., MD on 1/10/2019    REASON FOR VISIT: Routine scheduled follow-up.    HISTORY OF PRESENT ILLNESS:   73-year-old gentleman who originally saw me in February 2017 following a PSA level of 7 with transrectal ultrasound and biopsy revealing 4/17 cores positive for Shazia 3+3 disease, 9-30 percent of the cores with disease on the left side were nodules appreciated by ultrasound. Given his low risk designation and desire for therapy I recommended RP with Dr. Her. However he subsequently was appreciated to have significant coronary artery disease and proceeded with 3 vessel CABG in May 2017.    He re-presented to Dr. Hers office with PSA of 6.3 with repeat transrectal ultrasound and biopsy revealing again left-sided Shazia 3+3 disease in 5-25% of 7/16 cores with targeted biopsy of nodule negative. Updated PSA from December this year is 10.5. He discussed definitive therapy again with Dr. Her who recommended definitive radiotherapy given operative risk secondary to poor ejection fraction and recent cardiac events.     Patient completed definitive radiotherapy alone to 7740 cGy ending April 2019. Treatment was well-tolerated with mild radiation cystitis.    INTERVAL HISTORY:   Today patient denies dysuria, hematuria, diarrhea or bright red blood per rectum. He endorses nocturia ×2. He denies fatigue.  He recently had exacerbation of diverticulitis that was self resolved without surgery.  Next colonoscopy in 3 years.  He denies bone pain.    AUA 5  QOL 2  IIEF 1    Review of Systems   Constitutional: Negative for appetite change, chills, fever and unexpected weight change.   HENT:   Negative for  lump/mass, mouth sores, sore throat, trouble swallowing and voice change.    Eyes: Negative for eye problems and icterus.   Respiratory: Negative for chest tightness, cough, hemoptysis and shortness of breath.    Cardiovascular: Negative for chest pain and leg swelling.   Gastrointestinal: Negative for abdominal distention, abdominal pain, blood in stool, constipation, diarrhea, nausea and vomiting.   Genitourinary: Positive for nocturia. Negative for bladder incontinence, difficulty urinating, dysuria and hematuria.    Musculoskeletal: Negative for back pain, gait problem, neck pain and neck stiffness.   Neurological: Negative for extremity weakness, gait problem, headaches, numbness and seizures.   Hematological: Negative for adenopathy.     Past Medical History:   Diagnosis Date    Anemia 7/31/2017    B12    Cancer Feb 2017    Prostate    Cardiomyopathy     Cataract     Colon polyp     Coronary artery disease involving native coronary artery of native heart with angina pectoris 5/18/2017    Diabetes mellitus, type 2     Elevated glucose     Elevated PSA     Hyperlipidemia     Presence of stent in coronary artery 11/15/2017    Prostate cancer     Substance abuse     Tobacco abuse     Wears partial dentures     lowers     Past Surgical History:   Procedure Laterality Date    CARDIAC CATHETERIZATION  11/15/2017    LANNY to pCirc and Left main    CARDIAC DEFIBRILLATOR PLACEMENT Left 03/21/2018    CATARACT EXTRACTION Right     COLONOSCOPY      6 years ago    COLONOSCOPY N/A 10/8/2019    Procedure: COLONOSCOPY;  Surgeon: Jerad Jackson MD;  Location: [facility];  Service: General;  Laterality: N/A;    COLONOSCOPY N/A 10/22/2019    Procedure: COLONOSCOPY;  Surgeon: Jerad Jackson MD;  Location: [facility] END[facility];  Service: General;  Laterality: N/A;    CORONARY ARTERY BYPASS GRAFT  05/02/2017    CABG x 3    CORONARY ARTERY BYPASS GRAFT (CABG)  05/02/2017    triple bypass     ESOPHAGOGASTRODUODENOSCOPY N/A 10/8/2019    Procedure: ESOPHAGOGASTRODUODENOSCOPY (EGD);  Surgeon: Jerad Jackson MD;  [facility];  Service: General;  Laterality: N/A;    EYE SURGERY      right cataract removal    PROSTATE BIOPSY  Jan 2014    Negative    PROSTATE SURGERY      Biopsy    TRANSRECTAL ULTRASOUND OF PROSTATE WITH INSERTION OF GOLD FIDUCIAL MARKER N/A 2/7/2019    Procedure: ULTRASOUND, PROSTATE, Perineal APPROACH, WITH GOLD FIDUCIAL MARKER INSERTION;  Surgeon: Narinder Her MD;  [facility];  Service: Urology;  Laterality: N/A;     Social History     Socioeconomic History    Marital status: Single     Spouse name: Not on file    Number of children: Not on file    Years of education: Not on file    Highest education level: Not on file   Occupational History    Occupation: Retired   Social Needs    Financial resource strain: Not on file    Food insecurity:     Worry: Not on file     Inability: Not on file    Transportation needs:     Medical: Not on file     Non-medical: Not on file   Tobacco Use    Smoking status: Light Tobacco Smoker     Packs/day: 0.25     Years: 20.00     Pack years: 5.00     Types: Cigarettes    Smokeless tobacco: Never Used    Tobacco comment: Smokes 3 cig a day   Substance and Sexual Activity    Alcohol use: No    Drug use: No    Sexual activity: Not Currently   Lifestyle    Physical activity:     Days per week: Not on file     Minutes per session: Not on file    Stress: Not at all   Relationships    Social connections:     Talks on phone: Not on file     Gets together: Not on file     Attends Restoration service: Not on file     Active member of club or organization: Not on file     Attends meetings of clubs or organizations: Not on file     Relationship status: Not on file   Other Topics Concern    Not on file   Social History Narrative    Denies social or economic concerns      Family History   Problem Relation Age of Onset    Heart  disease Mother     Heart failure Mother     Heart disease Father     Heart disease Brother     Cancer Sister     Lung cancer Sister     Alzheimer's disease Maternal Aunt      Medication List with Changes/Refills   Current Medications    ATORVASTATIN (LIPITOR) 40 MG TABLET    Take 40 mg by mouth once daily.    CARVEDILOL (COREG) 6.25 MG TABLET    Take 1 tablet (6.25 mg total) by mouth 2 (two) times daily.    CYANOCOBALAMIN, VITAMIN B-12, 1,000 MCG TBSR    Take 1,000 mcg by mouth once daily.    DOXAZOSIN (CARDURA) 1 MG TABLET    Take 1 mg by mouth every evening.    LISINOPRIL (PRINIVIL,ZESTRIL) 2.5 MG TABLET    Take 1 tablet (2.5 mg total) by mouth once daily.    METFORMIN (GLUCOPHAGE) 1000 MG TABLET    Take 1 tablet (1,000 mg total) by mouth 2 (two) times daily with meals.    PANTOPRAZOLE (PROTONIX) 40 MG TABLET    Take 1 tablet (40 mg total) by mouth once daily.     Review of patient's allergies indicates:  No Known Allergies    QUALITY OF LIFE: 90%- Able to Carry on Normal Activity: Minor Symptoms of Disease    Vitals:    11/19/19 1259   BP: (!) 157/83   Pulse: 64   SpO2: 99%   Weight: 63 kg (138 lb 12.8 oz)   PainSc: 0-No pain     Body mass index is 21.74 kg/m².    PHYSICAL EXAM:   GENERAL: alert; in no apparent distress.   HEAD: normocephalic, atraumatic.  EYES: pupils are equal, round, reactive to light and accommodation. Sclera anicteric. Conjunctiva not injected.   NOSE/THROAT: no nasal erythema or rhinorrhea. Oropharynx pink, without erythema, ulcerations or thrush.   NECK: no cervical motion rigidity; supple with no masses.  CHEST: pursed lips; Patient is speaking comfortably on room air with normal work of breathing without using accessory muscles of respiration.  CARDIOVASCULAR: regular rate and rhythm; no murmurs, rubs or gallops. + defibrillator  ABDOMEN: soft, nontender, nondistended. Bowel sounds present.   MUSCULOSKELETAL: no tenderness to palpation along the spine or scapulae. Normal range of  motion.  NEUROLOGIC: cranial nerves II-XII intact bilaterally. Strength 5/5 in bilateral upper and lower extremities. No sensory deficits appreciated.  Normal gait.  EXTREMITIES: no  cyanosis, edema.  SKIN: no erythema, rashes, ulcerations noted.     ANCILLARY DATA:   PSA  4/19 3.8  7/19 0.98    ASSESSMENT: 74 y.o. male with Low-intermediate (favorable) risk prostate adenocarcinoma, [id number] GS 3+3 (7/16 cores; 5-25%), iPSA 10.5 status post 7740 cGy radiotherapy alone ending April 2019.  PLAN:   Nilton Sherwood did well with radiotherapy and has recovered back to baseline.  He was treated with radiotherapy alone and most recent PSA from July of this year is rated 0.98 and he is still not achieved his izaiah.  He missed his most recent PSA secondary to diverticulitis exacerbation requiring hospitalization.  This has corrected and he has a colonoscopy follow-up in 3 years and follows up with his primary care physician.  He will obtain PSA prior to his next visit with Dr. Her.  The expresses difficulty with multiple appointments and recommended he follow up here at least yearly and will return to clinic at that time.  Recommend continue with Q 3-6 month PSAs.  Our dietitian met the patient today in provided diverticulitis diet handout.    All questions answered and contact information provided. Patient understands free to call us anytime with any questions or concerns regarding radiation therapy.    I have personally seen and evaluated this patient. Greater than 50% of this time was spent discussing coordination of care and/or counseling.    PHYSICIAN: Slick Garcia Jr, MD

## 2019-11-19 NOTE — PROGRESS NOTES
NUTRITION NOTE    Received consult from Dr. Garcia to meet with  Presley to provide education on appropriate diet for diverticulitis. Recommend low-fiber diet free of gastric irritants during a flare-up. Provided printed literature on low-fiber diet along with a sample meal plan for diverticulitis. Pt verbalized understanding. Provided RD contact info & encouraged pt to call with any questions/concerns.         Electronically signed by Klarissa Roper MS, RDN, LDN

## 2020-01-23 ENCOUNTER — LAB VISIT (OUTPATIENT)
Dept: LAB | Facility: HOSPITAL | Age: 75
End: 2020-01-23
Attending: UROLOGY
Payer: MEDICARE

## 2020-01-23 DIAGNOSIS — C61 PROSTATE CANCER: ICD-10-CM

## 2020-01-23 LAB — COMPLEXED PSA SERPL-MCNC: 0.45 NG/ML (ref 0–4)

## 2020-01-23 PROCEDURE — 36415 COLL VENOUS BLD VENIPUNCTURE: CPT

## 2020-01-23 PROCEDURE — 84153 ASSAY OF PSA TOTAL: CPT

## 2020-01-28 ENCOUNTER — OFFICE VISIT (OUTPATIENT)
Dept: UROLOGY | Facility: CLINIC | Age: 75
End: 2020-01-28
Payer: MEDICARE

## 2020-01-28 VITALS
HEIGHT: 67 IN | WEIGHT: 150.13 LBS | TEMPERATURE: 98 F | HEART RATE: 70 BPM | BODY MASS INDEX: 23.56 KG/M2 | DIASTOLIC BLOOD PRESSURE: 70 MMHG | SYSTOLIC BLOOD PRESSURE: 119 MMHG

## 2020-01-28 DIAGNOSIS — N13.8 BPH WITH OBSTRUCTION/LOWER URINARY TRACT SYMPTOMS: Primary | ICD-10-CM

## 2020-01-28 DIAGNOSIS — N40.1 BPH WITH OBSTRUCTION/LOWER URINARY TRACT SYMPTOMS: Primary | ICD-10-CM

## 2020-01-28 DIAGNOSIS — C61 MALIGNANT NEOPLASM OF PROSTATE: ICD-10-CM

## 2020-01-28 LAB
BILIRUB SERPL-MCNC: NORMAL MG/DL
BLOOD URINE, POC: NORMAL
COLOR, POC UA: YELLOW
GLUCOSE UR QL STRIP: NORMAL
KETONES UR QL STRIP: NORMAL
LEUKOCYTE ESTERASE URINE, POC: NORMAL
NITRITE, POC UA: NORMAL
PH, POC UA: 6.5
POC RESIDUAL URINE VOLUME: 16 ML (ref 0–100)
PROTEIN, POC: NORMAL
SPECIFIC GRAVITY, POC UA: 1.02
UROBILINOGEN, POC UA: 0.2

## 2020-01-28 PROCEDURE — 81002 URINALYSIS NONAUTO W/O SCOPE: CPT | Mod: S$GLB,,, | Performed by: NURSE PRACTITIONER

## 2020-01-28 PROCEDURE — 99213 PR OFFICE/OUTPT VISIT, EST, LEVL III, 20-29 MIN: ICD-10-PCS | Mod: 25,S$GLB,, | Performed by: NURSE PRACTITIONER

## 2020-01-28 PROCEDURE — 1159F PR MEDICATION LIST DOCUMENTED IN MEDICAL RECORD: ICD-10-PCS | Mod: S$GLB,,, | Performed by: NURSE PRACTITIONER

## 2020-01-28 PROCEDURE — 99999 PR PBB SHADOW E&M-EST. PATIENT-LVL III: ICD-10-PCS | Mod: PBBFAC,,, | Performed by: NURSE PRACTITIONER

## 2020-01-28 PROCEDURE — 1101F PR PT FALLS ASSESS DOC 0-1 FALLS W/OUT INJ PAST YR: ICD-10-PCS | Mod: CPTII,S$GLB,, | Performed by: NURSE PRACTITIONER

## 2020-01-28 PROCEDURE — 81002 POCT URINE DIPSTICK WITHOUT MICROSCOPE: ICD-10-PCS | Mod: S$GLB,,, | Performed by: NURSE PRACTITIONER

## 2020-01-28 PROCEDURE — 1159F MED LIST DOCD IN RCRD: CPT | Mod: S$GLB,,, | Performed by: NURSE PRACTITIONER

## 2020-01-28 PROCEDURE — 1126F AMNT PAIN NOTED NONE PRSNT: CPT | Mod: S$GLB,,, | Performed by: NURSE PRACTITIONER

## 2020-01-28 PROCEDURE — 1101F PT FALLS ASSESS-DOCD LE1/YR: CPT | Mod: CPTII,S$GLB,, | Performed by: NURSE PRACTITIONER

## 2020-01-28 PROCEDURE — 99999 PR PBB SHADOW E&M-EST. PATIENT-LVL III: CPT | Mod: PBBFAC,,, | Performed by: NURSE PRACTITIONER

## 2020-01-28 PROCEDURE — 99213 OFFICE O/P EST LOW 20 MIN: CPT | Mod: 25,S$GLB,, | Performed by: NURSE PRACTITIONER

## 2020-01-28 PROCEDURE — 1126F PR PAIN SEVERITY QUANTIFIED, NO PAIN PRESENT: ICD-10-PCS | Mod: S$GLB,,, | Performed by: NURSE PRACTITIONER

## 2020-01-28 PROCEDURE — 51798 US URINE CAPACITY MEASURE: CPT | Mod: S$GLB,,, | Performed by: NURSE PRACTITIONER

## 2020-01-28 PROCEDURE — 51798 PR MEAS,POST-VOID RES,US,NON-IMAGING: ICD-10-PCS | Mod: S$GLB,,, | Performed by: NURSE PRACTITIONER

## 2020-01-28 NOTE — PROGRESS NOTES
[facility] Note  Staff: RUSSEL HuangP-C    PCP: Dr. Sha Merrill, III    Chief Complaint: Xdntya-fc-Cw of prostate cancer    Subjective:        HPI: Nilton Sherwood is a 74 y.o. male presents today for prostate cancer follow up.  Pt was last seen by Dr. Her on 7/23/2019.    PT  HX:  When PSA 7.0, which was a rise from 4.2 in 2015 (with negative prostate biopsy though 2 cores atypia present in 2014), biopsy by Dr Rodriguez on 2/6/17 revealed 23.7g gland with nodule/mass seen on ultrasound at [facility] and targeted with extra biopsy cores, which were positive.  4 core positive isaiah 3+3=6, including at area of suspicion on ultrasound. 9% 2 cores LB, 30% 1 core L TZ, 26% 1 core LA   Despite his low grade pathology, he was recommended to undergo treatment given his history of elevated psa and fairly exophytic lesion on ultrasound increasing risk for extraprostatic extension  He preferred surgical management but in addition to poorly controlled DM and HTN found to have cardiomyopathy with EF 35% and 3 vessel occlusive CAD requireing CABG and subsequent defibrillator.   He went on active surveillance      4/20/18 - psa 6.3. LUTS well controlled on flomax. No new back or bone pain.  5/15/18 - 1 year confirmatory AS prostate biopsy - 7/17 cores G6. 28.3cc gland  PATH: LB lat 5% 2/2 +PNI, LB medial 7%, LM lat 5%, LM medial 5%, LA lat 5%, LTZ 25% (* LB nodule cores benign)  repeat psa 12/3/18 of 10.5 with very low free psa of 0.8. At that time, AUA SS 5/2 mostly satisfied (flomax to doxazosin 1 mg by dr ness 2/2 dizziness)  With psa rise >10.5 advised definitive therapy. Had fiducial markers and spaceoar placed on 2/7/19 and completed definitive radiotherapy alone to 7740 cGy ending April 2019. Treatment was well-tolerated with mild radiation cystitis. AUA SS 13/2 at last visit with Dr Gacria in May     Last OV pt noted:  PSA 4/22/19 was 3.8, and most recently on 7/14/19 was  0.98  Tolerated radiation well. Mild increase in LUTS but he feels like he has returned to his pre-radiation control of LUTS  Cardiac status stable, moved to Q6 month visits and due to see cards in October  AUA SS: 5/1, pleased (2: sleeping; 1: frequency, intermittency, straining)  Does cut off fluids 2 hours before bed. Thinks he may snore.  Not too bothered by urinating at night as used to be 3-4x.  PVR by bladder scan is 17cc  Has lost 10lbs which he attributes to family stress    TODAY:   Last PSA done 1/23/2020 was 0.45 much improved as of today's ov.  AUA SS:  6/2  PVR today done in office:  16 mL  UA today in office showed normal findings.  Overall, pt doing very well with no complaints voiced at this time.     REVIEW OF SYSTEMS:  A comprehensive 10 system review was performed and is negative except as noted above in HPI    PMHx:  Past Medical History:   Diagnosis Date    Anemia 7/31/2017    B12    Cancer Feb 2017    Prostate    Cardiomyopathy     Cataract     Colon polyp     Coronary artery disease involving native coronary artery of native heart with angina pectoris 5/18/2017    Diabetes mellitus, type 2     Elevated glucose     Elevated PSA     Hyperlipidemia     Presence of stent in coronary artery 11/15/2017    Prostate cancer     Substance abuse     Tobacco abuse     Wears partial dentures     lowers     PSHx:  Past Surgical History:   Procedure Laterality Date    CARDIAC CATHETERIZATION  11/15/2017    LANNY to pCirc and Left main    CARDIAC DEFIBRILLATOR PLACEMENT Left 03/21/2018    CATARACT EXTRACTION Right     COLONOSCOPY      6 years ago    COLONOSCOPY N/A 10/8/2019    Procedure: COLONOSCOPY;  Surgeon: Jerad Jackson MD;  Location: [facility] ENDO;  Service: General;  Laterality: N/A;    COLONOSCOPY N/A 10/22/2019    Procedure: COLONOSCOPY;  Surgeon: Jerad Jackson MD;  Location: [facility] ENDO;  Service: General;  Laterality: N/A;    CORONARY ARTERY BYPASS GRAFT  05/02/2017     CABG x 3    CORONARY ARTERY BYPASS GRAFT (CABG)  05/02/2017    triple bypass    ESOPHAGOGASTRODUODENOSCOPY N/A 10/8/2019    Procedure: ESOPHAGOGASTRODUODENOSCOPY (EGD);  Surgeon: Jerad Jackson MD;  Location[facility];  Service: General;  Laterality: N/A;    EYE SURGERY      right cataract removal    PROSTATE BIOPSY  Jan 2014    Negative    PROSTATE SURGERY      Biopsy    TRANSRECTAL ULTRASOUND OF PROSTATE WITH INSERTION OF GOLD FIDUCIAL MARKER N/A 2/7/2019    Procedure: ULTRASOUND, PROSTATE, Perineal APPROACH, WITH GOLD FIDUCIAL MARKER INSERTION;  Surgeon: Narinder Her MD;  Location: [facility];  Service: Urology;  Laterality: N/A;     Allergies:  Patient has no known allergies.    Medications: reviewed  Objective:     Vitals:    01/28/20 1322   BP: 119/70   Pulse: 70   Temp: 98 °F (36.7 °C)     General:WDWN in NAD  Eyes: PERRLA, normal conjunctiva  Respiratory: no increased work on breathing, clear to auscultation  Cardiovascular: regular rate and rhythm. No obvious extremity edema.  GI: palpation of masses. No tenderness. No hepatosplenomegaly to palpation.  Musculoskeletal: normal range of motion of bilateral upper extremities. Normal muscle strength and tone.  Skin: no obvious rashes or lesions. No tightening of skin noted.  Neurologic: CN grossly normal. Normal sensation.   Psychiatric: awake, alert and oriented x 3. Mood and affect normal. Cooperative.    LABS REVIEW:  UA today:  Color:Clear, Yellow  Spec. Grav.  1.020  PH  6.5  Negative for leukocytes, nitrates, protein, glucose, ketones, urobili, bili, and blood.    Assessment:       1. BPH with obstruction/lower urinary tract symptoms    2. Malignant neoplasm of prostate          Plan:   Hx of Prostate Cancer:    F/u in one year with PSA test to be done prior to ov.  Order in Epic.    [facility]: Active    Krista Ang, JULISSA

## 2020-03-16 PROBLEM — Z12.11 ENCOUNTER FOR SCREENING COLONOSCOPY: Status: RESOLVED | Noted: 2019-10-08 | Resolved: 2020-03-16

## 2020-04-13 RX ORDER — PANTOPRAZOLE SODIUM 40 MG/1
TABLET, DELAYED RELEASE ORAL
Qty: 90 TABLET | Refills: 4 | Status: SHIPPED | OUTPATIENT
Start: 2020-04-13 | End: 2021-04-26

## 2020-10-19 ENCOUNTER — TELEPHONE (OUTPATIENT)
Dept: HEMATOLOGY/ONCOLOGY | Facility: CLINIC | Age: 75
End: 2020-10-19

## 2020-10-19 PROBLEM — D72.19 OTHER EOSINOPHILIA: Status: ACTIVE | Noted: 2020-10-19

## 2020-10-19 NOTE — TELEPHONE ENCOUNTER
Patient returned call and requests to establish care in [facility] with Dr. Dodson.  Patient scheduled for 1st available appt with Dr. Dodson @ [facility] 11/11/2020 @ 1140.   Address to [facility] clinic given. Patient instructed to arrive no more than 10 minutes early and bring picture ID and insurance card(s).   Patient verbalized understanding.  No further questions/concerns noted at this time.

## 2020-10-19 NOTE — TELEPHONE ENCOUNTER
LM encouraging call back to clinic to discuss referral from Alicia Yoon NP for anemia/fatigue.

## 2020-11-11 ENCOUNTER — OFFICE VISIT (OUTPATIENT)
Dept: HEMATOLOGY/ONCOLOGY | Facility: CLINIC | Age: 75
End: 2020-11-11
Payer: MEDICARE

## 2020-11-11 VITALS
DIASTOLIC BLOOD PRESSURE: 75 MMHG | BODY MASS INDEX: 23.07 KG/M2 | OXYGEN SATURATION: 99 % | HEART RATE: 73 BPM | RESPIRATION RATE: 19 BRPM | TEMPERATURE: 98 F | WEIGHT: 147 LBS | HEIGHT: 67 IN | SYSTOLIC BLOOD PRESSURE: 151 MMHG

## 2020-11-11 DIAGNOSIS — D72.19 OTHER EOSINOPHILIA: ICD-10-CM

## 2020-11-11 DIAGNOSIS — D64.9 ANEMIA, UNSPECIFIED TYPE: ICD-10-CM

## 2020-11-11 DIAGNOSIS — R53.83 FATIGUE, UNSPECIFIED TYPE: ICD-10-CM

## 2020-11-11 DIAGNOSIS — L25.9 CONTACT DERMATITIS, UNSPECIFIED CONTACT DERMATITIS TYPE, UNSPECIFIED TRIGGER: Primary | ICD-10-CM

## 2020-11-11 PROCEDURE — 1101F PT FALLS ASSESS-DOCD LE1/YR: CPT | Mod: CPTII,S$GLB,, | Performed by: INTERNAL MEDICINE

## 2020-11-11 PROCEDURE — 3008F PR BODY MASS INDEX (BMI) DOCUMENTED: ICD-10-PCS | Mod: CPTII,S$GLB,, | Performed by: INTERNAL MEDICINE

## 2020-11-11 PROCEDURE — 99204 PR OFFICE/OUTPT VISIT, NEW, LEVL IV, 45-59 MIN: ICD-10-PCS | Mod: S$GLB,,, | Performed by: INTERNAL MEDICINE

## 2020-11-11 PROCEDURE — 3008F BODY MASS INDEX DOCD: CPT | Mod: CPTII,S$GLB,, | Performed by: INTERNAL MEDICINE

## 2020-11-11 PROCEDURE — 1159F PR MEDICATION LIST DOCUMENTED IN MEDICAL RECORD: ICD-10-PCS | Mod: S$GLB,,, | Performed by: INTERNAL MEDICINE

## 2020-11-11 PROCEDURE — 99999 PR PBB SHADOW E&M-EST. PATIENT-LVL V: CPT | Mod: PBBFAC,,, | Performed by: INTERNAL MEDICINE

## 2020-11-11 PROCEDURE — 3288F PR FALLS RISK ASSESSMENT DOCUMENTED: ICD-10-PCS | Mod: CPTII,S$GLB,, | Performed by: INTERNAL MEDICINE

## 2020-11-11 PROCEDURE — 99204 OFFICE O/P NEW MOD 45 MIN: CPT | Mod: S$GLB,,, | Performed by: INTERNAL MEDICINE

## 2020-11-11 PROCEDURE — 1126F AMNT PAIN NOTED NONE PRSNT: CPT | Mod: S$GLB,,, | Performed by: INTERNAL MEDICINE

## 2020-11-11 PROCEDURE — 99999 PR PBB SHADOW E&M-EST. PATIENT-LVL V: ICD-10-PCS | Mod: PBBFAC,,, | Performed by: INTERNAL MEDICINE

## 2020-11-11 PROCEDURE — 3288F FALL RISK ASSESSMENT DOCD: CPT | Mod: CPTII,S$GLB,, | Performed by: INTERNAL MEDICINE

## 2020-11-11 PROCEDURE — 1159F MED LIST DOCD IN RCRD: CPT | Mod: S$GLB,,, | Performed by: INTERNAL MEDICINE

## 2020-11-11 PROCEDURE — 1126F PR PAIN SEVERITY QUANTIFIED, NO PAIN PRESENT: ICD-10-PCS | Mod: S$GLB,,, | Performed by: INTERNAL MEDICINE

## 2020-11-11 PROCEDURE — 1101F PR PT FALLS ASSESS DOC 0-1 FALLS W/OUT INJ PAST YR: ICD-10-PCS | Mod: CPTII,S$GLB,, | Performed by: INTERNAL MEDICINE

## 2020-11-11 RX ORDER — FLUOCINONIDE 0.5 MG/G
CREAM TOPICAL 3 TIMES DAILY
Qty: 30 G | Refills: 0 | Status: SHIPPED | OUTPATIENT
Start: 2020-11-11 | End: 2021-02-26 | Stop reason: ALTCHOICE

## 2020-11-11 NOTE — PATIENT INSTRUCTIONS
Please apply the steroid cream 3 times a day to affected area to soothe the inflamed tissue and this should help decrease eosinophilia   I have added labs like your PCP has to check your vitamin levels as this could be causing an inefficient production of your red blood cells. If a deficiency is noted, replacing the deficiency should help you red cells normalize.

## 2020-11-11 NOTE — Clinical Note
Thank you for the referral. He is too sweet. I have given him some steroid cream and told him to continue the B12 you gave him and will recheck his counts in a month. Hope you are doing well through this crazy time     Ana

## 2020-11-11 NOTE — PROGRESS NOTES
Fatigue and Anemia      Nilton Sherwood is a 75 y.o.  This is a 75 year old gentleman here for evaluation of anemia and eosinophilia. He has no allergies per his recollection. His PMH is significant for diabetes mellitus, cardiac disease , dyslipidemia and prostate cancer for which he received radiation alone. He continues to use tobacco products. His glucose levels have been fluctuating on metformin. Dyslipidemia is stable with a statin. He has no melena , no hematochezia. He does not use H2 blockers or Nsaids.     Past Medical History:   Diagnosis Date    Anemia 7/31/2017    B12    Cancer Feb 2017    Prostate    Cardiomyopathy     Cataract     Colon polyp     Coronary artery disease involving native coronary artery of native heart with angina pectoris 5/18/2017    Diabetes mellitus, type 2     Elevated glucose     Elevated PSA     Hyperlipidemia     Presence of stent in coronary artery 11/15/2017    Prostate cancer     Substance abuse     Tobacco abuse     Wears partial dentures     lowers     Past Surgical History:   Procedure Laterality Date    CARDIAC CATHETERIZATION  11/15/2017    LNANY to pCirc and Left main    CARDIAC DEFIBRILLATOR PLACEMENT Left 03/21/2018    CATARACT EXTRACTION Right     COLONOSCOPY      6 years ago    COLONOSCOPY N/A 10/8/2019    Procedure: COLONOSCOPY;  Surgeon: Jerad Jackson MD;  Location: [facility] ENDO;  Service: General;  Laterality: N/A;    COLONOSCOPY N/A 10/22/2019    Procedure: COLONOSCOPY;  Surgeon: Jerad Jackson MD;  Location: [facility] ENDO;  Service: General;  Laterality: N/A;    CORONARY ARTERY BYPASS GRAFT  05/02/2017    CABG x 3    CORONARY ARTERY BYPASS GRAFT (CABG)  05/02/2017    triple bypass    ESOPHAGOGASTRODUODENOSCOPY N/A 10/8/2019    Procedure: ESOPHAGOGASTRODUODENOSCOPY (EGD);  Surgeon: Jerad Jackson MD;  Location: [facility];  Service: General;  Laterality: N/A;    EYE SURGERY      right cataract removal    PROSTATE  BIOPSY  Jan 2014    Negative    PROSTATE SURGERY      Biopsy    TRANSRECTAL ULTRASOUND OF PROSTATE WITH INSERTION OF GOLD FIDUCIAL MARKER N/A 2/7/2019    Procedure: ULTRASOUND, PROSTATE, Perineal APPROACH, WITH GOLD FIDUCIAL MARKER INSERTION;  Surgeon: Narinder Her MD;  Location: [facility];  Service: Urology;  Laterality: N/A;       Current Outpatient Medications:     atorvastatin (LIPITOR) 40 MG tablet, Take 40 mg by mouth once daily., Disp: , Rfl:     carvediloL (COREG) 6.25 MG tablet, Take 1 tablet (6.25 mg total) by mouth 2 (two) times daily., Disp: 180 tablet, Rfl: 2    cyanocobalamin, vitamin B-12, 1,000 mcg TbSR, Take 1,000 mcg by mouth once daily., Disp: , Rfl: 0    doxazosin (CARDURA) 1 MG tablet, Take 1 mg by mouth every evening., Disp: , Rfl:     lisinopriL (PRINIVIL,ZESTRIL) 2.5 MG tablet, Take 1 tablet (2.5 mg total) by mouth once daily., Disp: 90 tablet, Rfl: 2    metFORMIN (GLUCOPHAGE) 1000 MG tablet, Take 1 tablet (1,000 mg total) by mouth 2 (two) times daily with meals., Disp: 180 tablet, Rfl: 2    pantoprazole (PROTONIX) 40 MG tablet, TAKE 1 TABLET EVERY DAY, Disp: 90 tablet, Rfl: 4    TRUE METRIX GLUCOSE TEST STRIP Strp, CHECK BLOOD GLUCOSE TWICE DAILY BEFORE MEALS, Disp: 200 strip, Rfl: 4    TRUEPLUS LANCETS 33 gauge Misc, TEST BLOOD GLUCOSE TWICE DAILY BEFORE MEALS, Disp: 100 each, Rfl: 9  Review of patient's allergies indicates:  No Known Allergies  Social History     Tobacco Use    Smoking status: Light Tobacco Smoker     Packs/day: 0.25     Years: 20.00     Pack years: 5.00     Types: Cigarettes    Smokeless tobacco: Never Used    Tobacco comment: Smokes 3 cig a day   Substance Use Topics    Alcohol use: No    Drug use: No     Family History   Problem Relation Age of Onset    Heart disease Mother     Heart failure Mother     Heart disease Father     Heart disease Brother     Cancer Sister     Lung cancer Sister     Alzheimer's disease Maternal Aunt   "      CONSTITUTIONAL: No fevers, chills, night sweats, wt. loss, appetite changes  SKIN: pt reports redness to the middle of his forehead and nose which is constant, non fluctuating, no associated pruritus , no fever, no other constitutional symptoms   ENT: No headaches, head trauma, vision changes, or eye pain  LYMPH NODES: None noticed   CV: No chest pain, palpitations.   RESP: No dyspnea on exertion, cough, wheezing, or hemoptysis  GI: No nausea, emesis, diarrhea, constipation, melena, hematochezia, pain.   : No dysuria, hematuria, urgency, or frequency   HEME: No easy bruising, bleeding problems  PSYCHIATRIC: No depression, anxiety, psychosis, hallucinations.  NEURO: No seizures, memory loss, dizziness or difficulty sleeping  MSK: No arthralgias or joint swelling         BP (!) 151/75   Pulse 73   Temp 97.7 °F (36.5 °C) (Temporal)   Resp 19   Ht 5' 7" (1.702 m)   Wt 66.7 kg (147 lb)   SpO2 99%   BMI 23.02 kg/m²   Gen: NAD, A and O x3,   Psych: pleasant affect, normal thought process  Eyes: Pupils round and non dilated, EOM intact  Nose: Nares patent  OP clear, mucosa patent  Face with erythema and skin peeling in areas noted on ROS, no open lesions  Neck: suppple, no JVD, trachea midline, no palpable mass, no adenopathy  Lungs: CTAB, no wheezes, no use of accessory muscles  CV: S1S2 with RRR, No mrg  Abd: soft, NTND, + BS, No HSM, no ascites  Extr: No CCE, SANKET, strength normal, good capillary refill  Neuro: steady gait, CNs grossly intact  Skin: intact   Rheum: No joint swelling  Cranial Nerves:      II: Pupillary reflexes normal     III, IV, VI: EOM normal     V: 1,2,3: normal sensation     VII: Normal strength in all divisions     IX, X: Normal voice, palatal elevation and sensation     XI: Shoulder strength normal       XII: Tongue mobility normal    Lab Results   Component Value Date    WBC 7.7 10/12/2020    HGB 12.6 (L) 10/12/2020    HCT 39.4 10/12/2020    MCV 89.7 10/12/2020     10/12/2020 "     Lab Results   Component Value Date    WBC 7.7 10/12/2020    RBC 4.39 10/12/2020    HGB 12.6 (L) 10/12/2020    HCT 39.4 10/12/2020    MCV 89.7 10/12/2020    MCH 28.7 10/12/2020    MCHC 32.0 10/12/2020    RDW 13.5 10/12/2020     10/12/2020    MPV 11.7 10/12/2020    GRAN 6.5 10/24/2019    GRAN 71.3 10/24/2019    LYMPH 1,155 10/12/2020    LYMPH 15.0 10/12/2020    MONO 554 10/12/2020    MONO 7.2 10/12/2020    EOS 1,286 (H) 10/12/2020    BASO 54 10/12/2020    EOSINOPHIL 16.7 10/12/2020    BASOPHIL 0.7 10/12/2020         CMP  Sodium   Date Value Ref Range Status   10/12/2020 143 135 - 146 mmol/L Final     Potassium   Date Value Ref Range Status   10/12/2020 4.3 3.5 - 5.3 mmol/L Final     Chloride   Date Value Ref Range Status   10/12/2020 106 98 - 110 mmol/L Final     CO2   Date Value Ref Range Status   10/12/2020 28 20 - 32 mmol/L Final     Glucose   Date Value Ref Range Status   10/12/2020 161 (H) 65 - 139 mg/dL Final     Comment:               Non-fasting reference interval          BUN   Date Value Ref Range Status   10/12/2020 9 7 - 25 mg/dL Final     Creatinine   Date Value Ref Range Status   10/12/2020 0.93 0.70 - 1.18 mg/dL Final     Comment:     For patients >49 years of age, the reference limit  for Creatinine is approximately 13% higher for people  identified as -American.          Calcium   Date Value Ref Range Status   10/12/2020 9.3 8.6 - 10.3 mg/dL Final     Total Protein   Date Value Ref Range Status   10/12/2020 6.4 6.1 - 8.1 g/dL Final     Albumin   Date Value Ref Range Status   10/12/2020 4.1 3.6 - 5.1 g/dL Final     Total Bilirubin   Date Value Ref Range Status   10/12/2020 0.5 0.2 - 1.2 mg/dL Final     Alkaline Phosphatase   Date Value Ref Range Status   06/09/2020 83 35 - 144 U/L Final     AST   Date Value Ref Range Status   10/12/2020 10 10 - 35 U/L Final     ALT   Date Value Ref Range Status   10/12/2020 7 (L) 9 - 46 U/L Final     Anion Gap   Date Value Ref Range Status  "  10/24/2019 12 8 - 16 mmol/L Final     eGFR if    Date Value Ref Range Status   10/12/2020 94 > OR = 60 mL/min/1.73m2 Final     eGFR if non    Date Value Ref Range Status   10/12/2020 81 > OR = 60 mL/min/1.73m2 Final       Contact dermatitis, unspecified contact dermatitis type, unspecified trigger    Anemia, unspecified type  -     Ambulatory referral/consult to Hematology / Oncology  -     fluocinonide 0.05% (LIDEX) 0.05 % cream; Apply topically 3 (three) times daily.  Dispense: 30 g; Refill: 0  -     CBC Auto Differential; Future; Expected date: 11/11/2020  -     Iron and TIBC; Future; Expected date: 11/11/2020  -     Vitamin B1; Future; Expected date: 11/11/2020  -     Vitamin B6; Future; Expected date: 11/11/2020  -     MMA; Future; Expected date: 11/11/2020    Other eosinophilia  -     Ambulatory referral/consult to Hematology / Oncology  -     fluocinonide 0.05% (LIDEX) 0.05 % cream; Apply topically 3 (three) times daily.  Dispense: 30 g; Refill: 0  -     CBC Auto Differential; Future; Expected date: 11/11/2020  -     Iron and TIBC; Future; Expected date: 11/11/2020  -     Vitamin B1; Future; Expected date: 11/11/2020  -     Vitamin B6; Future; Expected date: 11/11/2020  -     MMA; Future; Expected date: 11/11/2020    Fatigue, unspecified type  -     Ambulatory referral/consult to Hematology / Oncology      Trial of steroids  He is to continue B12 orally for now  If patient had hyperviscosity symptoms I would investigate further , but for now he is asymptomatic and likely has a reactive eosinophilia   If symptoms develop or eosinophils remain elevated in the near future will check for underlying gammapathy   Check blood levels in a few weeks and advise further depending on the results of such   I believe the eosinophilia is related to his " rash" and his anemia is likely due to a mild nutritional deficiency   Encouraged the use of My Chart for communications and " notifications  Explained his tobacco use could also be causing an elevation I eosinophilia and asked him to consider starting a cessation program    Recommend healthy living: no nicotine,  should avoid alcohol, healthy diet and regular exercise aiming for fitness, and weight control  1. Discussed healthy alcohol daily limit of 0.5 oz of pure alcohol in any 24 hours (roughly one 12-oz beers, 4 oz of wine (8%-12% alcohol), or 1.25 oz (half a shot) of liquor (80 proof)), can not save up.  2.   Discussed good exercise program, 4 key elements: 1. Aerobic (walking, swimming, dancing, jogging, biking, etc, 2. Muscle strengthening / resistance exercise, need to do 2-3 times  weekly, 3. Stretching daily, good stretch takes a whole  total minute. 4. Balance exercise daily.  3.   Discussed healthy diet for over 15 minutes with handouts given to the patient     Discussed COVID-19 and social distancing in great detail, avoid all non-essential visits out of the home if possible and avoid sick contacts.     Nurse Ashtyn checked pt out today   Thank you for allowing me to evaluate and participate in the care of this pleasant patient. Please fell free to call me with any questions or concerns.    Warmly,  Ana Dodson MD    This note was dictated with Dragon and briefly proofread. Please excuse any sentences that may be unclear or words misspelled

## 2020-12-04 ENCOUNTER — PATIENT MESSAGE (OUTPATIENT)
Dept: HEMATOLOGY/ONCOLOGY | Facility: CLINIC | Age: 75
End: 2020-12-04

## 2020-12-23 ENCOUNTER — OFFICE VISIT (OUTPATIENT)
Dept: HEMATOLOGY/ONCOLOGY | Facility: CLINIC | Age: 75
End: 2020-12-23
Payer: MEDICARE

## 2020-12-23 ENCOUNTER — LAB VISIT (OUTPATIENT)
Dept: LAB | Facility: HOSPITAL | Age: 75
End: 2020-12-23
Attending: INTERNAL MEDICINE
Payer: MEDICARE

## 2020-12-23 VITALS
HEART RATE: 66 BPM | HEIGHT: 67 IN | TEMPERATURE: 97 F | WEIGHT: 145.88 LBS | BODY MASS INDEX: 22.9 KG/M2 | SYSTOLIC BLOOD PRESSURE: 118 MMHG | DIASTOLIC BLOOD PRESSURE: 60 MMHG

## 2020-12-23 DIAGNOSIS — D64.9 ANEMIA, UNSPECIFIED TYPE: ICD-10-CM

## 2020-12-23 DIAGNOSIS — D64.9 ANEMIA, UNSPECIFIED TYPE: Primary | ICD-10-CM

## 2020-12-23 DIAGNOSIS — Z85.46 HISTORY OF PROSTATE CANCER: ICD-10-CM

## 2020-12-23 DIAGNOSIS — D72.19 OTHER EOSINOPHILIA: ICD-10-CM

## 2020-12-23 LAB
BASOPHILS # BLD AUTO: 0.09 K/UL (ref 0–0.2)
BASOPHILS NFR BLD: 0.9 % (ref 0–1.9)
DIFFERENTIAL METHOD: ABNORMAL
EOSINOPHIL # BLD AUTO: 1.3 K/UL (ref 0–0.5)
EOSINOPHIL NFR BLD: 12.8 % (ref 0–8)
ERYTHROCYTE [DISTWIDTH] IN BLOOD BY AUTOMATED COUNT: 13.4 % (ref 11.5–14.5)
HCT VFR BLD AUTO: 39 % (ref 40–54)
HGB BLD-MCNC: 12.7 G/DL (ref 14–18)
IMM GRANULOCYTES # BLD AUTO: 0.03 K/UL (ref 0–0.04)
IMM GRANULOCYTES NFR BLD AUTO: 0.3 % (ref 0–0.5)
IRON SERPL-MCNC: 88 UG/DL (ref 45–160)
LYMPHOCYTES # BLD AUTO: 1.3 K/UL (ref 1–4.8)
LYMPHOCYTES NFR BLD: 12.6 % (ref 18–48)
MCH RBC QN AUTO: 28.9 PG (ref 27–31)
MCHC RBC AUTO-ENTMCNC: 32.6 G/DL (ref 32–36)
MCV RBC AUTO: 89 FL (ref 82–98)
MONOCYTES # BLD AUTO: 0.7 K/UL (ref 0.3–1)
MONOCYTES NFR BLD: 6.9 % (ref 4–15)
NEUTROPHILS # BLD AUTO: 7 K/UL (ref 1.8–7.7)
NEUTROPHILS NFR BLD: 66.5 % (ref 38–73)
NRBC BLD-RTO: 0 /100 WBC
PLATELET # BLD AUTO: 213 K/UL (ref 150–350)
PMV BLD AUTO: 10.4 FL (ref 9.2–12.9)
RBC # BLD AUTO: 4.4 M/UL (ref 4.6–6.2)
SATURATED IRON: 22 % (ref 20–50)
TOTAL IRON BINDING CAPACITY: 403 UG/DL (ref 250–450)
TRANSFERRIN SERPL-MCNC: 272 MG/DL (ref 200–375)
WBC # BLD AUTO: 10.48 K/UL (ref 3.9–12.7)

## 2020-12-23 PROCEDURE — 1126F PR PAIN SEVERITY QUANTIFIED, NO PAIN PRESENT: ICD-10-PCS | Mod: S$GLB,,, | Performed by: INTERNAL MEDICINE

## 2020-12-23 PROCEDURE — 99214 OFFICE O/P EST MOD 30 MIN: CPT | Mod: S$GLB,,, | Performed by: INTERNAL MEDICINE

## 2020-12-23 PROCEDURE — 1126F AMNT PAIN NOTED NONE PRSNT: CPT | Mod: S$GLB,,, | Performed by: INTERNAL MEDICINE

## 2020-12-23 PROCEDURE — 1159F MED LIST DOCD IN RCRD: CPT | Mod: S$GLB,,, | Performed by: INTERNAL MEDICINE

## 2020-12-23 PROCEDURE — 84425 ASSAY OF VITAMIN B-1: CPT

## 2020-12-23 PROCEDURE — 84207 ASSAY OF VITAMIN B-6: CPT

## 2020-12-23 PROCEDURE — 99999 PR PBB SHADOW E&M-EST. PATIENT-LVL III: CPT | Mod: PBBFAC,,, | Performed by: INTERNAL MEDICINE

## 2020-12-23 PROCEDURE — 3008F BODY MASS INDEX DOCD: CPT | Mod: CPTII,S$GLB,, | Performed by: INTERNAL MEDICINE

## 2020-12-23 PROCEDURE — 1157F PR ADVANCE CARE PLAN OR EQUIV PRESENT IN MEDICAL RECORD: ICD-10-PCS | Mod: S$GLB,,, | Performed by: INTERNAL MEDICINE

## 2020-12-23 PROCEDURE — 1159F PR MEDICATION LIST DOCUMENTED IN MEDICAL RECORD: ICD-10-PCS | Mod: S$GLB,,, | Performed by: INTERNAL MEDICINE

## 2020-12-23 PROCEDURE — 1157F ADVNC CARE PLAN IN RCRD: CPT | Mod: S$GLB,,, | Performed by: INTERNAL MEDICINE

## 2020-12-23 PROCEDURE — 36415 COLL VENOUS BLD VENIPUNCTURE: CPT

## 2020-12-23 PROCEDURE — 3008F PR BODY MASS INDEX (BMI) DOCUMENTED: ICD-10-PCS | Mod: CPTII,S$GLB,, | Performed by: INTERNAL MEDICINE

## 2020-12-23 PROCEDURE — 83540 ASSAY OF IRON: CPT

## 2020-12-23 PROCEDURE — 99999 PR PBB SHADOW E&M-EST. PATIENT-LVL III: ICD-10-PCS | Mod: PBBFAC,,, | Performed by: INTERNAL MEDICINE

## 2020-12-23 PROCEDURE — 83921 ORGANIC ACID SINGLE QUANT: CPT

## 2020-12-23 PROCEDURE — 99214 PR OFFICE/OUTPT VISIT, EST, LEVL IV, 30-39 MIN: ICD-10-PCS | Mod: S$GLB,,, | Performed by: INTERNAL MEDICINE

## 2020-12-23 PROCEDURE — 85025 COMPLETE CBC W/AUTO DIFF WBC: CPT

## 2020-12-23 NOTE — Clinical Note
Could you monitor his Hb twice yearly? I think his low counts are due to chronic inflammation unknown etiology but as long as he remains above 12 he should be ok. I asked hm to return to see me only if his hemoglobin starts dropping below 11.   Dana Rivas

## 2020-12-23 NOTE — LETTER
December 24, 2020      Sha Merrill III, MD  [address]  [facility] [place] [address]           [facility]  [address] [place] [address]  Phone: [phone]          Patient: Nilton Sherwood   MR Number: [id number]   YOB: 1945   Date of Visit: 12/23/2020       Dear Dr. Sha Merrill III:    Thank you for referring Nilton Sherwood to me for evaluation. Attached you will find relevant portions of my assessment and plan of care.    If you have questions, please do not hesitate to call me. I look forward to following Nilton Sherwood along with you.    Sincerely,    Ana Dodson MD    Enclosure  CC:  No Recipients    If you would like to receive this communication electronically, please contact [email or web address] or [phone] to request more information on [employer] [employer] access.    For providers and/or their staff who would like to refer a patient to [facility], please contact us through our one-stop-shop provider referral line, [facility], at [phone].    If you feel you have received this communication in error or would no longer like to receive these types of communications, please e-mail [email or web address]

## 2020-12-23 NOTE — PROGRESS NOTES
CC My rash is better    Nilton Sherwood is a 75 y.o.  This is a 75 year old gentleman here for evaluation of anemia and eosinophilia. He has no allergies per his recollection. His PMH is significant for diabetes mellitus, cardiac disease , dyslipidemia and prostate cancer for which he received radiation alone. He continues to use tobacco products. His glucose levels have been fluctuating on metformin. Dyslipidemia is stable with a statin. He has no melena , no hematochezia. He does not use H2 blockers or Nsaids.   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 pectoris 5/18/2017    Diabetes mellitus, type 2     Elevated glucose     Elevated PSA     Hyperlipidemia     Presence of stent in coronary artery 11/15/2017    Prostate cancer     Substance abuse     Tobacco abuse     Wears partial dentures     lowers     Past Surgical History:   Procedure Laterality Date    CARDIAC CATHETERIZATION  11/15/2017    LANNY to pCirc and Left main    CARDIAC DEFIBRILLATOR PLACEMENT Left 03/21/2018    CATARACT EXTRACTION Right     COLONOSCOPY      6 years ago    COLONOSCOPY N/A 10/8/2019    Procedure: COLONOSCOPY;  Surgeon: Jerad Jackson MD;  Location: [facility] END[facility];  Service: General;  Laterality: N/A;    COLONOSCOPY N/A 10/22/2019    Procedure: COLONOSCOPY;  Surgeon: Jerad Jackson MD;  Location: [facility] ENDO;  Service: General;  Laterality: N/A;    CORONARY ARTERY BYPASS GRAFT  05/02/2017    CABG x 3    CORONARY ARTERY BYPASS GRAFT (CABG)  05/02/2017    triple bypass    ESOPHAGOGASTRODUODENOSCOPY N/A 10/8/2019    Procedure: ESOPHAGOGASTRODUODENOSCOPY (EGD);  Surgeon: Jerad Jackson MD;  Location: [facility];  Service: General;  Laterality: N/A;    EYE SURGERY      right cataract removal     PROSTATE BIOPSY  Jan 2014    Negative    PROSTATE SURGERY      Biopsy    TRANSRECTAL ULTRASOUND OF PROSTATE WITH INSERTION OF GOLD FIDUCIAL MARKER N/A 2/7/2019    Procedure: ULTRASOUND, PROSTATE, Perineal APPROACH, WITH GOLD FIDUCIAL MARKER INSERTION;  Surgeon: Narinder Her MD;  Location: [facility];  Service: Urology;  Laterality: N/A;       Current Outpatient Medications:     atorvastatin (LIPITOR) 40 MG tablet, Take 40 mg by mouth once daily., Disp: , Rfl:     carvediloL (COREG) 6.25 MG tablet, Take 1 tablet (6.25 mg total) by mouth 2 (two) times daily., Disp: 180 tablet, Rfl: 2    cyanocobalamin, vitamin B-12, 1,000 mcg TbSR, Take 1,000 mcg by mouth once daily., Disp: , Rfl: 0    doxazosin (CARDURA) 1 MG tablet, Take 1 mg by mouth every evening., Disp: , Rfl:     fluocinonide 0.05% (LIDEX) 0.05 % cream, Apply topically 3 (three) times daily., Disp: 30 g, Rfl: 0    lisinopriL 10 MG tablet, Take 10 mg by mouth once daily., Disp: , Rfl:     metFORMIN (GLUCOPHAGE) 1000 MG tablet, Take 1 tablet (1,000 mg total) by mouth 2 (two) times daily with meals., Disp: 180 tablet, Rfl: 2    pantoprazole (PROTONIX) 40 MG tablet, TAKE 1 TABLET EVERY DAY, Disp: 90 tablet, Rfl: 4    TRUE METRIX GLUCOSE TEST STRIP Strp, CHECK BLOOD GLUCOSE TWICE DAILY BEFORE MEALS, Disp: 200 strip, Rfl: 4    TRUEPLUS LANCETS 33 gauge Misc, TEST BLOOD GLUCOSE TWICE DAILY BEFORE MEALS, Disp: 100 each, Rfl: 9  Review of patient's allergies indicates:  No Known Allergies  Social History     Tobacco Use    Smoking status: Light Tobacco Smoker     Packs/day: 0.25     Years: 20.00     Pack years: 5.00     Types: Cigarettes    Smokeless tobacco: Never Used    Tobacco comment: Smokes 3 cig a day   Substance Use Topics    Alcohol use: No    Drug use: No     Family History   Problem Relation Age of Onset    Heart disease Mother     Heart failure Mother     Heart disease Father     Heart disease Brother     Cancer Sister     Lung  "cancer Sister     Alzheimer's disease Maternal Aunt        CONSTITUTIONAL: No fevers, chills, night sweats, wt. loss, appetite changes  SKIN: pt reports redness to the middle of his forehead and nose which is constant, non fluctuating, no associated pruritus , no fever, no other constitutional symptoms   ENT: No headaches, head trauma, vision changes, or eye pain  LYMPH NODES: None noticed   CV: No chest pain, palpitations.   RESP: No dyspnea on exertion, cough, wheezing, or hemoptysis  GI: No nausea, emesis, diarrhea, constipation, melena, hematochezia, pain.   : No dysuria, hematuria, urgency, or frequency   HEME: No easy bruising, bleeding problems  PSYCHIATRIC: No depression, anxiety, psychosis, hallucinations.  NEURO: No seizures, memory loss, dizziness or difficulty sleeping  MSK: No arthralgias or joint swelling         /60   Pulse 66   Temp 97.3 °F (36.3 °C)   Ht 5' 7" (1.702 m)   Wt 66.2 kg (145 lb 14.4 oz)   BMI 22.85 kg/m²   Gen: NAD, A and O x3,   Psych: pleasant affect, normal thought process  Eyes: Pupils round and non dilated, EOM intact  Nose: Nares patent  OP clear, mucosa patent  Face with erythema and skin peeling in areas noted on ROS, no open lesions  Neck: suppple, no JVD, trachea midline, no palpable mass, no adenopathy  Lungs: CTAB, no wheezes, no use of accessory muscles  CV: S1S2 with RRR, No mrg  Abd: soft, NTND, + BS, No HSM, no ascites  Extr: No CCE, SANKET, strength normal, good capillary refill  Neuro: steady gait, CNs grossly intact  Skin: intact   Rheum: No joint swelling  Cranial Nerves:      II: Pupillary reflexes normal     III, IV, VI: EOM normal     V: 1,2,3: normal sensation     VII: Normal strength in all divisions     IX, X: Normal voice, palatal elevation and sensation     XI: Shoulder strength normal       XII: Tongue mobility normal    Lab Results   Component Value Date    WBC 10.48 12/23/2020    HGB 12.7 (L) 12/23/2020    HCT 39.0 (L) 12/23/2020    MCV 89 " 12/23/2020     12/23/2020     Lab Results   Component Value Date    WBC 10.48 12/23/2020    RBC 4.40 (L) 12/23/2020    HGB 12.7 (L) 12/23/2020    HCT 39.0 (L) 12/23/2020    MCV 89 12/23/2020    MCH 28.9 12/23/2020    MCHC 32.6 12/23/2020    RDW 13.4 12/23/2020     12/23/2020    MPV 10.4 12/23/2020    GRAN 7.0 12/23/2020    GRAN 66.5 12/23/2020    LYMPH 1.3 12/23/2020    LYMPH 12.6 (L) 12/23/2020    MONO 0.7 12/23/2020    MONO 6.9 12/23/2020    EOS 1.3 (H) 12/23/2020    BASO 0.09 12/23/2020    EOSINOPHIL 12.8 (H) 12/23/2020    BASOPHIL 0.9 12/23/2020         CMP  Sodium   Date Value Ref Range Status   10/12/2020 143 135 - 146 mmol/L Final     Potassium   Date Value Ref Range Status   10/12/2020 4.3 3.5 - 5.3 mmol/L Final     Chloride   Date Value Ref Range Status   10/12/2020 106 98 - 110 mmol/L Final     CO2   Date Value Ref Range Status   10/12/2020 28 20 - 32 mmol/L Final     Glucose   Date Value Ref Range Status   10/12/2020 161 (H) 65 - 139 mg/dL Final     Comment:               Non-fasting reference interval          BUN   Date Value Ref Range Status   10/12/2020 9 7 - 25 mg/dL Final     Creatinine   Date Value Ref Range Status   10/12/2020 0.93 0.70 - 1.18 mg/dL Final     Comment:     For patients >49 years of age, the reference limit  for Creatinine is approximately 13% higher for people  identified as -American.          Calcium   Date Value Ref Range Status   10/12/2020 9.3 8.6 - 10.3 mg/dL Final     Total Protein   Date Value Ref Range Status   10/12/2020 6.4 6.1 - 8.1 g/dL Final     Albumin   Date Value Ref Range Status   10/12/2020 4.1 3.6 - 5.1 g/dL Final     Total Bilirubin   Date Value Ref Range Status   10/12/2020 0.5 0.2 - 1.2 mg/dL Final     Alkaline Phosphatase   Date Value Ref Range Status   06/09/2020 83 35 - 144 U/L Final     AST   Date Value Ref Range Status   10/12/2020 10 10 - 35 U/L Final     ALT   Date Value Ref Range Status   10/12/2020 7 (L) 9 - 46 U/L Final      Anion Gap   Date Value Ref Range Status   10/24/2019 12 8 - 16 mmol/L Final     eGFR if    Date Value Ref Range Status   10/12/2020 94 > OR = 60 mL/min/1.73m2 Final     eGFR if non    Date Value Ref Range Status   10/12/2020 81 > OR = 60 mL/min/1.73m2 Final       Anemia, unspecified type    Other eosinophilia    History of prostate cancer          Rash resolved   Eosinophilia stable   Anemia normocytic stable : likely of chronic disease and inflammation  Continue to monitor counts with pcp twice a year, Will see if pcp can monitor such  Please refer him back if his hemoglobin starts trending down to less than 11     Encouraged the use of My Chart for communications and notifications  Explained his tobacco use could also be causing an elevation I eosinophilia and asked him to consider starting a cessation program    Recommend healthy living: no nicotine,  should avoid alcohol, healthy diet and regular exercise aiming for fitness, and weight control  1. Discussed healthy alcohol daily limit of 0.5 oz of pure alcohol in any 24 hours (roughly one 12-oz beers, 4 oz of wine (8%-12% alcohol), or 1.25 oz (half a shot) of liquor (80 proof)), can not save up.  2.   Discussed good exercise program, 4 key elements: 1. Aerobic (walking, swimming, dancing, jogging, biking, etc, 2. Muscle strengthening / resistance exercise, need to do 2-3 times  weekly, 3. Stretching daily, good stretch takes a whole  total minute. 4. Balance exercise daily.  3.   Discussed healthy diet for over 15 minutes with handouts given to the patient     Discussed COVID-19 and social distancing in great detail, avoid all non-essential visits out of the home if possible and avoid sick contacts.     Nurse Ashtyn checked pt out today   Thank you for allowing me to evaluate and participate in the care of this pleasant patient. Please fell free to call me with any questions or concerns.    Warmly,  Ana Dodson MD    This  note was dictated with Dragon and briefly proofread. Please excuse any sentences that may be unclear or words misspelled

## 2020-12-29 LAB
METHYLMALONATE SERPL-SCNC: 0.11 UMOL/L
PYRIDOXAL SERPL-MCNC: 8 UG/L (ref 5–50)
VIT B1 BLD-MCNC: 59 UG/L (ref 38–122)

## 2021-03-30 ENCOUNTER — IMMUNIZATION (OUTPATIENT)
Dept: FAMILY MEDICINE | Facility: CLINIC | Age: 76
End: 2021-03-30
Payer: MEDICARE

## 2021-03-30 DIAGNOSIS — Z23 NEED FOR VACCINATION: Primary | ICD-10-CM

## 2021-03-30 PROCEDURE — 0011A COVID-19, MRNA, LNP-S, PF, 100 MCG/0.5 ML DOSE VACCINE: ICD-10-PCS | Mod: CV19,S$GLB,, | Performed by: FAMILY MEDICINE

## 2021-03-30 PROCEDURE — 91301 COVID-19, MRNA, LNP-S, PF, 100 MCG/0.5 ML DOSE VACCINE: CPT | Mod: S$GLB,,, | Performed by: FAMILY MEDICINE

## 2021-03-30 PROCEDURE — 0011A COVID-19, MRNA, LNP-S, PF, 100 MCG/0.5 ML DOSE VACCINE: CPT | Mod: CV19,S$GLB,, | Performed by: FAMILY MEDICINE

## 2021-03-30 PROCEDURE — 91301 COVID-19, MRNA, LNP-S, PF, 100 MCG/0.5 ML DOSE VACCINE: ICD-10-PCS | Mod: S$GLB,,, | Performed by: FAMILY MEDICINE

## 2021-04-27 ENCOUNTER — IMMUNIZATION (OUTPATIENT)
Dept: FAMILY MEDICINE | Facility: CLINIC | Age: 76
End: 2021-04-27
Payer: MEDICARE

## 2021-04-27 DIAGNOSIS — Z23 NEED FOR VACCINATION: Primary | ICD-10-CM

## 2021-04-27 PROCEDURE — 0012A COVID-19, MRNA, LNP-S, PF, 100 MCG/0.5 ML DOSE VACCINE: CPT | Mod: CV19,S$GLB,, | Performed by: FAMILY MEDICINE

## 2021-04-27 PROCEDURE — 91301 COVID-19, MRNA, LNP-S, PF, 100 MCG/0.5 ML DOSE VACCINE: ICD-10-PCS | Mod: S$GLB,,, | Performed by: FAMILY MEDICINE

## 2021-04-27 PROCEDURE — 91301 COVID-19, MRNA, LNP-S, PF, 100 MCG/0.5 ML DOSE VACCINE: CPT | Mod: S$GLB,,, | Performed by: FAMILY MEDICINE

## 2021-04-27 PROCEDURE — 0012A COVID-19, MRNA, LNP-S, PF, 100 MCG/0.5 ML DOSE VACCINE: ICD-10-PCS | Mod: CV19,S$GLB,, | Performed by: FAMILY MEDICINE

## 2021-07-01 PROBLEM — I25.10 CORONARY ARTERY DISEASE INVOLVING NATIVE CORONARY ARTERY OF NATIVE HEART WITHOUT ANGINA PECTORIS: Status: ACTIVE | Noted: 2017-05-18

## 2021-07-01 PROBLEM — K92.2 ACUTE LOWER GASTROINTESTINAL HEMORRHAGE: Status: RESOLVED | Noted: 2019-10-21 | Resolved: 2021-07-01

## 2021-07-01 PROBLEM — Z87.19 HISTORY OF GI DIVERTICULAR BLEED: Status: RESOLVED | Noted: 2019-10-23 | Resolved: 2021-07-01

## 2021-07-01 PROBLEM — R73.9 HEMOGLOBIN A1C BETWEEN 7.0% AND 9.0%: Status: RESOLVED | Noted: 2017-07-31 | Resolved: 2021-07-01

## 2021-07-01 PROBLEM — D62 ACUTE BLOOD LOSS ANEMIA: Status: RESOLVED | Noted: 2019-10-21 | Resolved: 2021-07-01

## 2021-07-27 ENCOUNTER — HOSPITAL ENCOUNTER (OUTPATIENT)
Dept: RADIOLOGY | Facility: HOSPITAL | Age: 76
Discharge: HOME | End: 2021-07-27
Attending: NURSE PRACTITIONER
Payer: MEDICARE

## 2021-07-27 DIAGNOSIS — R53.83 FATIGUE, UNSPECIFIED TYPE: ICD-10-CM

## 2021-07-27 DIAGNOSIS — R26.89 BALANCE PROBLEM: ICD-10-CM

## 2021-07-27 DIAGNOSIS — R51.9 NONINTRACTABLE HEADACHE, UNSPECIFIED CHRONICITY PATTERN, UNSPECIFIED HEADACHE TYPE: ICD-10-CM

## 2021-07-27 PROCEDURE — 70450 CT HEAD WITHOUT CONTRAST: ICD-10-PCS | Mod: 26,,, | Performed by: RADIOLOGY

## 2021-07-27 PROCEDURE — 70450 CT HEAD/BRAIN W/O DYE: CPT | Mod: 26,,, | Performed by: RADIOLOGY

## 2021-07-27 PROCEDURE — 70450 CT HEAD/BRAIN W/O DYE: CPT | Mod: TC

## 2021-11-11 ENCOUNTER — IMMUNIZATION (OUTPATIENT)
Dept: FAMILY MEDICINE | Facility: CLINIC | Age: 76
End: 2021-11-11
Payer: MEDICARE

## 2021-11-11 DIAGNOSIS — Z23 NEED FOR VACCINATION: Primary | ICD-10-CM

## 2021-11-11 PROCEDURE — 0064A COVID-19, MRNA, LNP-S, PF, 100 MCG/0.25 ML DOSE VACCINE (MODERNA BOOSTER): CPT | Mod: PBBFAC | Performed by: FAMILY MEDICINE

## 2022-02-24 PROBLEM — E78.00 PURE HYPERCHOLESTEROLEMIA: Status: ACTIVE | Noted: 2022-02-24

## 2022-11-18 NOTE — PLAN OF CARE
Discharged ambulatory in stable condition    Render Risk Assessment In Note?: no Additional Notes: Patient consent was obtained to proceed with the visit and recommended plan of care after discussion of all risks and benefits, including the risks of COVID-19 exposure. Detail Level: Simple

## 2023-05-10 RX ORDER — PANTOPRAZOLE SODIUM 40 MG/1
40 TABLET, DELAYED RELEASE ORAL DAILY
Qty: 90 TABLET | Refills: 4 | Status: SHIPPED | OUTPATIENT
Start: 2023-05-10

## 2023-11-16 PROBLEM — I25.5 ISCHEMIC CARDIOMYOPATHY: Status: RESOLVED | Noted: 2023-11-16 | Resolved: 2023-11-16

## 2023-11-16 PROBLEM — I44.7 LEFT BUNDLE BRANCH BLOCK (LBBB): Status: RESOLVED | Noted: 2017-03-13 | Resolved: 2023-11-16

## 2023-11-16 PROBLEM — Z95.1 S/P CABG X 3: Status: RESOLVED | Noted: 2017-05-18 | Resolved: 2023-11-16

## 2023-11-16 PROBLEM — R93.89 ABNORMAL VENTRICULAR WALL MOTION: Status: RESOLVED | Noted: 2017-03-13 | Resolved: 2023-11-16

## 2023-11-16 PROBLEM — E78.00 PURE HYPERCHOLESTEROLEMIA: Status: RESOLVED | Noted: 2022-02-24 | Resolved: 2023-11-16

## 2023-11-16 PROBLEM — I50.20 HEART FAILURE WITH REDUCED EJECTION FRACTION: Status: ACTIVE | Noted: 2023-11-16

## 2023-11-16 PROBLEM — Z85.46 HISTORY OF PROSTATE CANCER: Status: ACTIVE | Noted: 2017-02-23

## 2023-11-16 PROBLEM — C61 MALIGNANT NEOPLASM OF PROSTATE: Status: RESOLVED | Noted: 2018-05-15 | Resolved: 2023-11-16

## 2023-11-16 PROBLEM — I25.10 CAD (CORONARY ARTERY DISEASE): Status: ACTIVE | Noted: 2017-05-01

## 2023-11-16 PROBLEM — E78.2 MIXED HYPERLIPIDEMIA: Status: ACTIVE | Noted: 2017-03-15

## 2023-11-16 PROBLEM — I25.5 ISCHEMIC CARDIOMYOPATHY: Status: ACTIVE | Noted: 2017-03-13

## 2023-11-16 PROBLEM — R94.30 CARDIAC LV EJECTION FRACTION 30-35%: Status: RESOLVED | Noted: 2017-03-13 | Resolved: 2023-11-16

## 2023-11-16 PROBLEM — I25.5 ISCHEMIC CARDIOMYOPATHY: Status: ACTIVE | Noted: 2023-11-16

## 2023-11-16 PROBLEM — E78.5 HYPERLIPIDEMIA: Status: ACTIVE | Noted: 2017-03-15

## 2023-11-21 ENCOUNTER — HOSPITAL ENCOUNTER (OUTPATIENT)
Dept: RADIOLOGY | Facility: HOSPITAL | Age: 78
Discharge: HOME | End: 2023-11-21
Attending: NURSE PRACTITIONER
Payer: MEDICARE

## 2023-11-21 DIAGNOSIS — R63.0 DECREASED APPETITE: ICD-10-CM

## 2023-11-21 DIAGNOSIS — F17.200 CURRENT SMOKER: ICD-10-CM

## 2023-11-21 DIAGNOSIS — R63.4 WEIGHT LOSS, UNINTENTIONAL: ICD-10-CM

## 2023-11-21 PROCEDURE — 71046 XR CHEST PA AND LATERAL: ICD-10-PCS | Mod: 26,,, | Performed by: RADIOLOGY

## 2023-11-21 PROCEDURE — 76700 US ABDOMEN COMPLETE: ICD-10-PCS | Mod: 26,,, | Performed by: RADIOLOGY

## 2023-11-21 PROCEDURE — 71046 X-RAY EXAM CHEST 2 VIEWS: CPT | Mod: TC

## 2023-11-21 PROCEDURE — 76700 US EXAM ABDOM COMPLETE: CPT | Mod: TC

## 2023-11-21 PROCEDURE — 71046 X-RAY EXAM CHEST 2 VIEWS: CPT | Mod: 26,,, | Performed by: RADIOLOGY

## 2023-11-21 PROCEDURE — 76700 US EXAM ABDOM COMPLETE: CPT | Mod: 26,,, | Performed by: RADIOLOGY

## 2023-11-30 ENCOUNTER — HOSPITAL ENCOUNTER (OUTPATIENT)
Dept: RADIOLOGY | Facility: HOSPITAL | Age: 78
Discharge: HOME | End: 2023-11-30
Attending: NURSE PRACTITIONER
Payer: MEDICARE

## 2023-11-30 DIAGNOSIS — R63.4 WEIGHT LOSS, UNINTENTIONAL: ICD-10-CM

## 2023-11-30 DIAGNOSIS — K83.8 INTRAHEPATIC BILE DUCT DILATION: ICD-10-CM

## 2023-11-30 DIAGNOSIS — R63.0 DECREASED APPETITE: ICD-10-CM

## 2023-11-30 PROCEDURE — 25500020 PHARM REV CODE 255: Performed by: NURSE PRACTITIONER

## 2023-11-30 PROCEDURE — 74160 CT ABDOMEN W/CONTRAST: CPT | Mod: TC

## 2023-11-30 PROCEDURE — 74160 CT ABDOMEN W/CONTRAST: CPT | Mod: 26,,, | Performed by: RADIOLOGY

## 2023-11-30 PROCEDURE — 74160 CT ABDOMEN WITH IV CONTRAST: ICD-10-PCS | Mod: 26,,, | Performed by: RADIOLOGY

## 2023-11-30 PROCEDURE — A9698 NON-RAD CONTRAST MATERIALNOC: HCPCS | Performed by: NURSE PRACTITIONER

## 2023-11-30 RX ADMIN — IOHEXOL 500 ML: 9 SOLUTION ORAL at 02:11

## 2023-11-30 RX ADMIN — IOHEXOL 75 ML: 350 INJECTION, SOLUTION INTRAVENOUS at 02:11

## (undated) DEVICE — KIT ENDO CARRY ON PROCEDURE

## (undated) DEVICE — SOL IRRI STRL WATER 1000ML

## (undated) DEVICE — SPONGE SUPER KERLIX 6X6.75IN

## (undated) DEVICE — SEE MEDLINE ITEM 157117

## (undated) DEVICE — CANISTER SUCTION 3000CC

## (undated) DEVICE — CONTAINER SPECIMEN STRL 4OZ

## (undated) DEVICE — SCRUB 10% POVIDONE IODINE 4OZ

## (undated) DEVICE — SOL PVP-I SCRUB 7.5% 4OZ

## (undated) DEVICE — SYR ONLY LUER LOCK 20CC

## (undated) DEVICE — SYR SLIP TIP 5CC

## (undated) DEVICE — GLOVE PROTEXIS PI CLASSIC 6.0

## (undated) DEVICE — SYR 10CC LUER LOCK

## (undated) DEVICE — GLOVE SURG ULTRA TOUCH 7

## (undated) DEVICE — Device

## (undated) DEVICE — NDL SPINAL 22GX7 SPINOCAN

## (undated) DEVICE — SEE MEDLINE ITEM 157131

## (undated) DEVICE — TRAY DRY SPONGE SCRUB W FOAM

## (undated) DEVICE — SEE MEDLINE ITEM 152622

## (undated) DEVICE — SPACER SPACEOAR DELIVERY SYS